# Patient Record
Sex: FEMALE | Race: ASIAN | NOT HISPANIC OR LATINO | Employment: FULL TIME | URBAN - METROPOLITAN AREA
[De-identification: names, ages, dates, MRNs, and addresses within clinical notes are randomized per-mention and may not be internally consistent; named-entity substitution may affect disease eponyms.]

---

## 2017-01-05 ENCOUNTER — OFFICE VISIT - HEALTHEAST (OUTPATIENT)
Dept: FAMILY MEDICINE | Facility: CLINIC | Age: 22
End: 2017-01-05

## 2017-01-05 DIAGNOSIS — B18.1 CHRONIC HEPATITIS B (H): ICD-10-CM

## 2017-01-05 DIAGNOSIS — Z28.39 SUSCEPTIBLE TO VARICELLA (NON-IMMUNE), CURRENTLY PREGNANT: ICD-10-CM

## 2017-01-05 DIAGNOSIS — O09.899 SUSCEPTIBLE TO VARICELLA (NON-IMMUNE), CURRENTLY PREGNANT: ICD-10-CM

## 2017-01-05 DIAGNOSIS — Z32.01 POSITIVE PREGNANCY TEST: ICD-10-CM

## 2017-01-05 DIAGNOSIS — O09.892 SHORT INTERVAL BETWEEN PREGNANCIES AFFECTING PREGNANCY IN SECOND TRIMESTER, ANTEPARTUM: ICD-10-CM

## 2017-01-05 ASSESSMENT — MIFFLIN-ST. JEOR: SCORE: 1195.82

## 2017-06-29 ENCOUNTER — COMMUNICATION - HEALTHEAST (OUTPATIENT)
Dept: FAMILY MEDICINE | Facility: CLINIC | Age: 22
End: 2017-06-29

## 2017-07-21 ENCOUNTER — PRENATAL OFFICE VISIT - HEALTHEAST (OUTPATIENT)
Dept: FAMILY MEDICINE | Facility: CLINIC | Age: 22
End: 2017-07-21

## 2017-07-21 ENCOUNTER — AMBULATORY - HEALTHEAST (OUTPATIENT)
Dept: ADMINISTRATIVE | Facility: CLINIC | Age: 22
End: 2017-07-21

## 2017-07-21 DIAGNOSIS — Z32.01 POSITIVE PREGNANCY TEST: ICD-10-CM

## 2017-07-21 DIAGNOSIS — Z34.90 NORMAL PREGNANCY: ICD-10-CM

## 2017-07-21 DIAGNOSIS — B19.10 HEPATITIS B: ICD-10-CM

## 2017-07-21 DIAGNOSIS — N92.6 ABNORMAL MENSES: ICD-10-CM

## 2017-07-21 LAB
ALT SERPL W P-5'-P-CCNC: 13 U/L (ref 0–45)
HIV 1+2 AB+HIV1 P24 AG SERPL QL IA: NEGATIVE

## 2017-07-22 LAB — HBV SURFACE AG SERPL QL IA: ABNORMAL

## 2017-07-24 LAB
HBV DNA DETECT/QUANT, S: DETECTED IU/ML
SYPHILIS RPR SCREEN - HISTORICAL: NORMAL

## 2017-08-04 ENCOUNTER — RECORDS - HEALTHEAST (OUTPATIENT)
Dept: ADMINISTRATIVE | Facility: OTHER | Age: 22
End: 2017-08-04

## 2017-08-07 ENCOUNTER — AMBULATORY - HEALTHEAST (OUTPATIENT)
Dept: FAMILY MEDICINE | Facility: CLINIC | Age: 22
End: 2017-08-07

## 2017-08-07 ENCOUNTER — COMMUNICATION - HEALTHEAST (OUTPATIENT)
Dept: FAMILY MEDICINE | Facility: CLINIC | Age: 22
End: 2017-08-07

## 2017-08-10 ENCOUNTER — COMMUNICATION - HEALTHEAST (OUTPATIENT)
Dept: FAMILY MEDICINE | Facility: CLINIC | Age: 22
End: 2017-08-10

## 2017-09-01 ENCOUNTER — PRENATAL OFFICE VISIT - HEALTHEAST (OUTPATIENT)
Dept: FAMILY MEDICINE | Facility: CLINIC | Age: 22
End: 2017-09-01

## 2017-09-01 DIAGNOSIS — O09.892 SHORT INTERVAL BETWEEN PREGNANCIES AFFECTING PREGNANCY IN SECOND TRIMESTER, ANTEPARTUM: ICD-10-CM

## 2017-09-01 DIAGNOSIS — Z34.92 NORMAL PREGNANCY, SECOND TRIMESTER: ICD-10-CM

## 2017-09-01 DIAGNOSIS — Z34.82 ENCOUNTER FOR SUPERVISION OF OTHER NORMAL PREGNANCY IN SECOND TRIMESTER: ICD-10-CM

## 2017-09-01 ASSESSMENT — MIFFLIN-ST. JEOR: SCORE: 1254.79

## 2017-09-11 ENCOUNTER — HOSPITAL ENCOUNTER (OUTPATIENT)
Dept: ULTRASOUND IMAGING | Facility: HOSPITAL | Age: 22
Discharge: HOME | End: 2017-09-11
Attending: FAMILY MEDICINE

## 2017-09-11 ENCOUNTER — OFFICE VISIT - HEALTHEAST (OUTPATIENT)
Dept: FAMILY MEDICINE | Facility: CLINIC | Age: 22
End: 2017-09-11

## 2017-09-11 ENCOUNTER — COMMUNICATION - HEALTHEAST (OUTPATIENT)
Dept: SCHEDULING | Facility: CLINIC | Age: 22
End: 2017-09-11

## 2017-09-11 DIAGNOSIS — O09.892 SHORT INTERVAL BETWEEN PREGNANCIES AFFECTING PREGNANCY IN SECOND TRIMESTER, ANTEPARTUM: ICD-10-CM

## 2017-09-11 DIAGNOSIS — O46.92 SECOND TRIMESTER BLEEDING: ICD-10-CM

## 2017-09-11 DIAGNOSIS — Z34.92 NORMAL PREGNANCY, SECOND TRIMESTER: ICD-10-CM

## 2017-09-11 DIAGNOSIS — Z75.8 LANGUAGE BARRIER: ICD-10-CM

## 2017-09-11 DIAGNOSIS — Z60.3 LANGUAGE BARRIER: ICD-10-CM

## 2017-09-11 SDOH — SOCIAL STABILITY - SOCIAL INSECURITY: ACCULTURATION DIFFICULTY: Z60.3

## 2017-09-11 ASSESSMENT — MIFFLIN-ST. JEOR: SCORE: 1254.79

## 2017-09-12 ENCOUNTER — COMMUNICATION - HEALTHEAST (OUTPATIENT)
Dept: FAMILY MEDICINE | Facility: CLINIC | Age: 22
End: 2017-09-12

## 2017-09-18 ENCOUNTER — PRENATAL OFFICE VISIT - HEALTHEAST (OUTPATIENT)
Dept: FAMILY MEDICINE | Facility: CLINIC | Age: 22
End: 2017-09-18

## 2017-09-18 ENCOUNTER — COMMUNICATION - HEALTHEAST (OUTPATIENT)
Dept: FAMILY MEDICINE | Facility: CLINIC | Age: 22
End: 2017-09-18

## 2017-09-18 DIAGNOSIS — Z34.92 NORMAL PREGNANCY, SECOND TRIMESTER: ICD-10-CM

## 2017-09-18 DIAGNOSIS — O46.92 SECOND TRIMESTER BLEEDING: ICD-10-CM

## 2017-09-18 DIAGNOSIS — Z34.90 PREGNANCY: ICD-10-CM

## 2017-10-06 ENCOUNTER — AMBULATORY - HEALTHEAST (OUTPATIENT)
Dept: SCHEDULING | Facility: CLINIC | Age: 22
End: 2017-10-06

## 2017-10-06 ENCOUNTER — PRENATAL OFFICE VISIT - HEALTHEAST (OUTPATIENT)
Dept: FAMILY MEDICINE | Facility: CLINIC | Age: 22
End: 2017-10-06

## 2017-10-06 DIAGNOSIS — Z34.82 ENCOUNTER FOR SUPERVISION OF OTHER NORMAL PREGNANCY IN SECOND TRIMESTER: ICD-10-CM

## 2017-10-06 DIAGNOSIS — O26.849 SIGNIFICANT DISCREPANCY BETWEEN UTERINE SIZE AND CLINICAL DATES, ANTEPARTUM: ICD-10-CM

## 2017-10-13 ENCOUNTER — COMMUNICATION - HEALTHEAST (OUTPATIENT)
Dept: FAMILY MEDICINE | Facility: CLINIC | Age: 22
End: 2017-10-13

## 2017-11-03 ENCOUNTER — PRENATAL OFFICE VISIT - HEALTHEAST (OUTPATIENT)
Dept: FAMILY MEDICINE | Facility: CLINIC | Age: 22
End: 2017-11-03

## 2017-11-03 DIAGNOSIS — Z34.92 NORMAL PREGNANCY IN SECOND TRIMESTER: ICD-10-CM

## 2017-11-03 DIAGNOSIS — Z34.82 ENCOUNTER FOR SUPERVISION OF OTHER NORMAL PREGNANCY IN SECOND TRIMESTER: ICD-10-CM

## 2017-11-03 DIAGNOSIS — O28.3 ECHOGENIC INTRACARDIAC FOCUS OF FETUS ON PRENATAL ULTRASOUND: ICD-10-CM

## 2017-11-13 ENCOUNTER — PRENATAL OFFICE VISIT - HEALTHEAST (OUTPATIENT)
Dept: FAMILY MEDICINE | Facility: CLINIC | Age: 22
End: 2017-11-13

## 2017-11-13 DIAGNOSIS — Z34.92 NORMAL PREGNANCY IN SECOND TRIMESTER: ICD-10-CM

## 2017-11-13 DIAGNOSIS — O47.00 PRETERM UTERINE CONTRACTIONS: ICD-10-CM

## 2017-11-14 ENCOUNTER — COMMUNICATION - HEALTHEAST (OUTPATIENT)
Dept: FAMILY MEDICINE | Facility: CLINIC | Age: 22
End: 2017-11-14

## 2017-11-20 ENCOUNTER — COMMUNICATION - HEALTHEAST (OUTPATIENT)
Dept: FAMILY MEDICINE | Facility: CLINIC | Age: 22
End: 2017-11-20

## 2017-12-29 ENCOUNTER — OFFICE VISIT - HEALTHEAST (OUTPATIENT)
Dept: FAMILY MEDICINE | Facility: CLINIC | Age: 22
End: 2017-12-29

## 2018-03-10 ENCOUNTER — OFFICE VISIT - HEALTHEAST (OUTPATIENT)
Dept: FAMILY MEDICINE | Facility: CLINIC | Age: 23
End: 2018-03-10

## 2018-03-10 DIAGNOSIS — M26.609 TMJ (TEMPOROMANDIBULAR JOINT SYNDROME): ICD-10-CM

## 2018-03-10 DIAGNOSIS — L03.811 CELLULITIS OF SCALP: ICD-10-CM

## 2018-08-14 ENCOUNTER — COMMUNICATION - HEALTHEAST (OUTPATIENT)
Dept: FAMILY MEDICINE | Facility: CLINIC | Age: 23
End: 2018-08-14

## 2018-08-22 ENCOUNTER — PRENATAL OFFICE VISIT - HEALTHEAST (OUTPATIENT)
Dept: FAMILY MEDICINE | Facility: CLINIC | Age: 23
End: 2018-08-22

## 2018-08-22 ENCOUNTER — HOSPITAL ENCOUNTER (OUTPATIENT)
Dept: ULTRASOUND IMAGING | Facility: HOSPITAL | Age: 23
Discharge: HOME | End: 2018-08-22
Attending: PHYSICIAN ASSISTANT

## 2018-08-22 DIAGNOSIS — Z34.90 NORMAL PREGNANCY: ICD-10-CM

## 2018-08-22 DIAGNOSIS — N92.6 ABNORMAL MENSES: ICD-10-CM

## 2018-08-22 DIAGNOSIS — Z32.01 PREGNANCY TEST POSITIVE: ICD-10-CM

## 2018-08-22 DIAGNOSIS — B18.1 CHRONIC HEPATITIS B (H): ICD-10-CM

## 2018-08-22 DIAGNOSIS — O09.899 HISTORY OF PRETERM DELIVERY, CURRENTLY PREGNANT: ICD-10-CM

## 2018-08-22 LAB
ALBUMIN UR-MCNC: NEGATIVE MG/DL
AMPHETAMINES UR QL SCN: NORMAL
BARBITURATES UR QL: NORMAL
BASOPHILS # BLD AUTO: 0 THOU/UL (ref 0–0.2)
BASOPHILS NFR BLD AUTO: 0 % (ref 0–2)
BENZODIAZ UR QL: NORMAL
CANNABINOIDS UR QL SCN: NORMAL
COCAINE UR QL: NORMAL
CREAT UR-MCNC: 157.5 MG/DL
EOSINOPHIL # BLD AUTO: 0.2 THOU/UL (ref 0–0.4)
EOSINOPHIL NFR BLD AUTO: 3 % (ref 0–6)
ERYTHROCYTE [DISTWIDTH] IN BLOOD BY AUTOMATED COUNT: 13.9 % (ref 11–14.5)
GLUCOSE UR STRIP-MCNC: NEGATIVE MG/DL
HCG UR QL: POSITIVE
HCT VFR BLD AUTO: 36.2 % (ref 35–47)
HGB BLD-MCNC: 12.3 G/DL (ref 12–16)
HIV 1+2 AB+HIV1 P24 AG SERPL QL IA: NEGATIVE
KETONES UR STRIP-MCNC: NEGATIVE MG/DL
LYMPHOCYTES # BLD AUTO: 2 THOU/UL (ref 0.8–4.4)
LYMPHOCYTES NFR BLD AUTO: 25 % (ref 20–40)
MCH RBC QN AUTO: 27.8 PG (ref 27–34)
MCHC RBC AUTO-ENTMCNC: 34 G/DL (ref 32–36)
MCV RBC AUTO: 82 FL (ref 80–100)
MONOCYTES # BLD AUTO: 0.7 THOU/UL (ref 0–0.9)
MONOCYTES NFR BLD AUTO: 8 % (ref 2–10)
NEUTROPHILS # BLD AUTO: 5.1 THOU/UL (ref 2–7.7)
NEUTROPHILS NFR BLD AUTO: 64 % (ref 50–70)
OPIATES UR QL SCN: NORMAL
OXYCODONE UR QL: NORMAL
PCP UR QL SCN: NORMAL
PLATELET # BLD AUTO: 238 THOU/UL (ref 140–440)
PMV BLD AUTO: 9.1 FL (ref 8.5–12.5)
RBC # BLD AUTO: 4.42 MILL/UL (ref 3.8–5.4)
T PALLIDUM AB SER QL: NEGATIVE
WBC: 7.9 THOU/UL (ref 4–11)

## 2018-08-23 LAB
ABO/RH(D): NORMAL
ABORH REPEAT: NORMAL
ANTIBODY SCREEN: NEGATIVE
BACTERIA SPEC CULT: NO GROWTH
COLLECTION METHOD: NORMAL
LEAD BLD-MCNC: NORMAL UG/DL
LEAD RETEST: NO
RUBV IGG SERPL QL IA: POSITIVE

## 2018-08-24 LAB
ALT SERPL W P-5'-P-CCNC: 11 U/L (ref 0–45)
GUARDIAN FIRST NAME: NORMAL
GUARDIAN LAST NAME: NORMAL
HBV SURFACE AG SERPL QL IA: ABNORMAL
HEALTH CARE PROVIDER CITY: NORMAL
HEALTH CARE PROVIDER NAME: NORMAL
HEALTH CARE PROVIDER PHONE: NORMAL
HEALTH CARE PROVIDER STATE: NORMAL
HEALTH CARE PROVIDER STREET ADDRESS: NORMAL
HEALTH CARE PROVIDER ZIP CODE: NORMAL
LEAD, B: <1 MCG/DL (ref 0–4.9)
PATIENT CITY: NORMAL
PATIENT COUNTY: NORMAL
PATIENT EMPLOYER: NORMAL
PATIENT ETHNICITY: NORMAL
PATIENT HOME PHONE: NORMAL
PATIENT OCCUPATION: NORMAL
PATIENT RACE: NORMAL
PATIENT STATE: NORMAL
PATIENT STREET ADDRESS: NORMAL
PATIENT ZIP CODE: NORMAL
SUBMITTING LABORATORY PHONE: NORMAL
VENOUS/CAPILLARY: NORMAL

## 2018-08-28 LAB
HBV DNA SERPL NAA+PROBE-ACNC: ABNORMAL [IU]/ML
HBV DNA SERPL NAA+PROBE-LOG IU: >8.2 {LOG_IU}/ML

## 2018-09-07 ENCOUNTER — PRENATAL OFFICE VISIT - HEALTHEAST (OUTPATIENT)
Dept: FAMILY MEDICINE | Facility: CLINIC | Age: 23
End: 2018-09-07

## 2018-09-07 DIAGNOSIS — B18.1 CHRONIC HEPATITIS B (H): ICD-10-CM

## 2018-09-07 DIAGNOSIS — O99.210 OBESITY AFFECTING PREGNANCY: ICD-10-CM

## 2018-09-07 DIAGNOSIS — O09.899 HISTORY OF PRETERM DELIVERY, CURRENTLY PREGNANT: ICD-10-CM

## 2018-09-07 DIAGNOSIS — Z34.81 ENCOUNTER FOR SUPERVISION OF OTHER NORMAL PREGNANCY IN FIRST TRIMESTER: ICD-10-CM

## 2018-09-07 DIAGNOSIS — O09.899 SHORT INTERVAL BETWEEN PREGNANCIES AFFECTING PREGNANCY, ANTEPARTUM: ICD-10-CM

## 2018-09-07 LAB
CLUE CELLS: NORMAL
TRICHOMONAS, WET PREP: NORMAL
YEAST, WET PREP: NORMAL

## 2018-09-10 LAB
C TRACH DNA SPEC QL PROBE+SIG AMP: NEGATIVE
N GONORRHOEA DNA SPEC QL NAA+PROBE: NEGATIVE

## 2018-09-11 ENCOUNTER — COMMUNICATION - HEALTHEAST (OUTPATIENT)
Dept: FAMILY MEDICINE | Facility: CLINIC | Age: 23
End: 2018-09-11

## 2018-09-21 ENCOUNTER — COMMUNICATION - HEALTHEAST (OUTPATIENT)
Dept: PHARMACY | Facility: CLINIC | Age: 23
End: 2018-09-21

## 2018-09-21 ENCOUNTER — OFFICE VISIT - HEALTHEAST (OUTPATIENT)
Dept: PHARMACY | Facility: CLINIC | Age: 23
End: 2018-09-21

## 2018-09-21 DIAGNOSIS — O09.899 HISTORY OF PRETERM DELIVERY, CURRENTLY PREGNANT: ICD-10-CM

## 2018-09-21 DIAGNOSIS — B18.1 CHRONIC HEPATITIS B (H): ICD-10-CM

## 2018-10-05 ENCOUNTER — PRENATAL OFFICE VISIT - HEALTHEAST (OUTPATIENT)
Dept: FAMILY MEDICINE | Facility: CLINIC | Age: 23
End: 2018-10-05

## 2018-10-05 ENCOUNTER — OFFICE VISIT - HEALTHEAST (OUTPATIENT)
Dept: PHARMACY | Facility: CLINIC | Age: 23
End: 2018-10-05

## 2018-10-05 DIAGNOSIS — O09.899 HISTORY OF PRETERM DELIVERY, CURRENTLY PREGNANT: ICD-10-CM

## 2018-10-05 DIAGNOSIS — O09.892 SUPERVISION OF OTHER HIGH RISK PREGNANCIES, SECOND TRIMESTER: ICD-10-CM

## 2018-10-05 LAB
ALBUMIN UR-MCNC: NEGATIVE MG/DL
GLUCOSE UR STRIP-MCNC: NEGATIVE MG/DL
KETONES UR STRIP-MCNC: NEGATIVE MG/DL

## 2018-10-09 ENCOUNTER — AMBULATORY - HEALTHEAST (OUTPATIENT)
Dept: PHARMACY | Facility: CLINIC | Age: 23
End: 2018-10-09

## 2018-10-09 DIAGNOSIS — O09.899 HISTORY OF PRETERM DELIVERY, CURRENTLY PREGNANT: ICD-10-CM

## 2018-10-18 ENCOUNTER — AMBULATORY - HEALTHEAST (OUTPATIENT)
Dept: PHARMACY | Facility: CLINIC | Age: 23
End: 2018-10-18

## 2018-10-23 ENCOUNTER — AMBULATORY - HEALTHEAST (OUTPATIENT)
Dept: FAMILY MEDICINE | Facility: CLINIC | Age: 23
End: 2018-10-23

## 2018-10-23 DIAGNOSIS — Z59.71 INSURANCE COVERAGE PROBLEMS: ICD-10-CM

## 2018-10-23 DIAGNOSIS — O09.899 HISTORY OF PRETERM DELIVERY, CURRENTLY PREGNANT: ICD-10-CM

## 2018-10-26 ENCOUNTER — AMBULATORY - HEALTHEAST (OUTPATIENT)
Dept: NURSING | Facility: CLINIC | Age: 23
End: 2018-10-26

## 2018-10-26 ENCOUNTER — AMBULATORY - HEALTHEAST (OUTPATIENT)
Dept: FAMILY MEDICINE | Facility: CLINIC | Age: 23
End: 2018-10-26

## 2018-10-26 DIAGNOSIS — O09.899 HISTORY OF PRETERM DELIVERY, CURRENTLY PREGNANT: ICD-10-CM

## 2018-10-31 ENCOUNTER — COMMUNICATION - HEALTHEAST (OUTPATIENT)
Dept: NURSING | Facility: CLINIC | Age: 23
End: 2018-10-31

## 2018-11-02 ENCOUNTER — PRENATAL OFFICE VISIT - HEALTHEAST (OUTPATIENT)
Dept: FAMILY MEDICINE | Facility: CLINIC | Age: 23
End: 2018-11-02

## 2018-11-02 DIAGNOSIS — O09.212 HIGH RISK PREGNANCY DUE TO HISTORY OF PRETERM LABOR IN SECOND TRIMESTER: ICD-10-CM

## 2018-11-02 DIAGNOSIS — O09.899 HISTORY OF PRETERM DELIVERY, CURRENTLY PREGNANT: ICD-10-CM

## 2018-11-02 DIAGNOSIS — O09.92 HIGH-RISK PREGNANCY SUPERVISION, SECOND TRIMESTER: ICD-10-CM

## 2018-11-08 ENCOUNTER — COMMUNICATION - HEALTHEAST (OUTPATIENT)
Dept: FAMILY MEDICINE | Facility: CLINIC | Age: 23
End: 2018-11-08

## 2018-11-09 ENCOUNTER — AMBULATORY - HEALTHEAST (OUTPATIENT)
Dept: NURSING | Facility: CLINIC | Age: 23
End: 2018-11-09

## 2018-11-16 ENCOUNTER — AMBULATORY - HEALTHEAST (OUTPATIENT)
Dept: NURSING | Facility: CLINIC | Age: 23
End: 2018-11-16

## 2018-11-23 ENCOUNTER — AMBULATORY - HEALTHEAST (OUTPATIENT)
Dept: NURSING | Facility: CLINIC | Age: 23
End: 2018-11-23

## 2018-11-26 ENCOUNTER — RECORDS - HEALTHEAST (OUTPATIENT)
Dept: ADMINISTRATIVE | Facility: OTHER | Age: 23
End: 2018-11-26

## 2018-11-30 ENCOUNTER — PRENATAL OFFICE VISIT - HEALTHEAST (OUTPATIENT)
Dept: FAMILY MEDICINE | Facility: CLINIC | Age: 23
End: 2018-11-30

## 2018-11-30 DIAGNOSIS — O09.212 HIGH RISK PREGNANCY DUE TO HISTORY OF PRETERM LABOR IN SECOND TRIMESTER: ICD-10-CM

## 2018-11-30 DIAGNOSIS — Z34.82 ENCOUNTER FOR SUPERVISION OF OTHER NORMAL PREGNANCY IN SECOND TRIMESTER: ICD-10-CM

## 2018-12-07 ENCOUNTER — AMBULATORY - HEALTHEAST (OUTPATIENT)
Dept: NURSING | Facility: CLINIC | Age: 23
End: 2018-12-07

## 2018-12-14 ENCOUNTER — AMBULATORY - HEALTHEAST (OUTPATIENT)
Dept: NURSING | Facility: CLINIC | Age: 23
End: 2018-12-14

## 2018-12-21 ENCOUNTER — AMBULATORY - HEALTHEAST (OUTPATIENT)
Dept: NURSING | Facility: CLINIC | Age: 23
End: 2018-12-21

## 2018-12-28 ENCOUNTER — PRENATAL OFFICE VISIT - HEALTHEAST (OUTPATIENT)
Dept: FAMILY MEDICINE | Facility: CLINIC | Age: 23
End: 2018-12-28

## 2018-12-28 ENCOUNTER — COMMUNICATION - HEALTHEAST (OUTPATIENT)
Dept: FAMILY MEDICINE | Facility: CLINIC | Age: 23
End: 2018-12-28

## 2018-12-28 DIAGNOSIS — O09.212 HIGH RISK PREGNANCY DUE TO HISTORY OF PRETERM LABOR IN SECOND TRIMESTER: ICD-10-CM

## 2018-12-28 DIAGNOSIS — O09.899 HISTORY OF PRETERM DELIVERY, CURRENTLY PREGNANT: ICD-10-CM

## 2018-12-28 DIAGNOSIS — Z60.3 LANGUAGE BARRIER: ICD-10-CM

## 2018-12-28 DIAGNOSIS — O09.892 SUPERVISION OF OTHER HIGH RISK PREGNANCIES, SECOND TRIMESTER: ICD-10-CM

## 2018-12-28 DIAGNOSIS — Z75.8 LANGUAGE BARRIER: ICD-10-CM

## 2018-12-28 DIAGNOSIS — B18.1 CHRONIC HEPATITIS B (H): ICD-10-CM

## 2018-12-28 LAB
ALBUMIN UR-MCNC: ABNORMAL MG/DL
FASTING STATUS PATIENT QL REPORTED: NO
GLUCOSE 1H P 50 G GLC PO SERPL-MCNC: 97 MG/DL (ref 70–139)
GLUCOSE UR STRIP-MCNC: NEGATIVE MG/DL
HGB BLD-MCNC: 11.9 G/DL (ref 12–16)
KETONES UR STRIP-MCNC: NEGATIVE MG/DL
T PALLIDUM AB SER QL: NEGATIVE

## 2018-12-28 SDOH — SOCIAL STABILITY - SOCIAL INSECURITY: ACCULTURATION DIFFICULTY: Z60.3

## 2019-01-01 LAB
HBV DNA SERPL NAA+PROBE-ACNC: ABNORMAL [IU]/ML
HBV DNA SERPL NAA+PROBE-LOG IU: 7.8 {LOG_IU}/ML

## 2019-01-04 ENCOUNTER — AMBULATORY - HEALTHEAST (OUTPATIENT)
Dept: NURSING | Facility: CLINIC | Age: 24
End: 2019-01-04

## 2019-01-11 ENCOUNTER — AMBULATORY - HEALTHEAST (OUTPATIENT)
Dept: NURSING | Facility: CLINIC | Age: 24
End: 2019-01-11

## 2019-01-18 ENCOUNTER — COMMUNICATION - HEALTHEAST (OUTPATIENT)
Dept: FAMILY MEDICINE | Facility: CLINIC | Age: 24
End: 2019-01-18

## 2019-01-18 ENCOUNTER — AMBULATORY - HEALTHEAST (OUTPATIENT)
Dept: NURSING | Facility: CLINIC | Age: 24
End: 2019-01-18

## 2019-01-25 ENCOUNTER — PRENATAL OFFICE VISIT - HEALTHEAST (OUTPATIENT)
Dept: FAMILY MEDICINE | Facility: CLINIC | Age: 24
End: 2019-01-25

## 2019-01-25 DIAGNOSIS — B18.1 CHRONIC HEPATITIS B (H): ICD-10-CM

## 2019-01-25 DIAGNOSIS — O09.899 HISTORY OF PRETERM DELIVERY, CURRENTLY PREGNANT: ICD-10-CM

## 2019-01-25 DIAGNOSIS — O99.213 OBESITY AFFECTING PREGNANCY IN THIRD TRIMESTER: ICD-10-CM

## 2019-01-25 DIAGNOSIS — O09.899 SHORT INTERVAL BETWEEN PREGNANCIES AFFECTING PREGNANCY, ANTEPARTUM: ICD-10-CM

## 2019-01-25 DIAGNOSIS — Z75.8 LANGUAGE BARRIER: ICD-10-CM

## 2019-01-25 DIAGNOSIS — O09.893 SUPERVISION OF OTHER HIGH RISK PREGNANCIES, THIRD TRIMESTER: ICD-10-CM

## 2019-01-25 DIAGNOSIS — Z60.3 LANGUAGE BARRIER: ICD-10-CM

## 2019-01-25 SDOH — SOCIAL STABILITY - SOCIAL INSECURITY: ACCULTURATION DIFFICULTY: Z60.3

## 2019-02-01 ENCOUNTER — AMBULATORY - HEALTHEAST (OUTPATIENT)
Dept: NURSING | Facility: CLINIC | Age: 24
End: 2019-02-01

## 2019-02-05 ENCOUNTER — PRENATAL OFFICE VISIT - HEALTHEAST (OUTPATIENT)
Dept: FAMILY MEDICINE | Facility: CLINIC | Age: 24
End: 2019-02-05

## 2019-02-05 DIAGNOSIS — O09.893 SUPERVISION OF OTHER HIGH RISK PREGNANCIES, THIRD TRIMESTER: ICD-10-CM

## 2019-02-07 ENCOUNTER — COMMUNICATION - HEALTHEAST (OUTPATIENT)
Dept: FAMILY MEDICINE | Facility: CLINIC | Age: 24
End: 2019-02-07

## 2019-02-07 LAB
ALLERGIC TO PENICILLIN: NO
GP B STREP DNA SPEC QL NAA+PROBE: POSITIVE

## 2019-02-08 ENCOUNTER — AMBULATORY - HEALTHEAST (OUTPATIENT)
Dept: NURSING | Facility: CLINIC | Age: 24
End: 2019-02-08

## 2019-02-12 ENCOUNTER — PRENATAL OFFICE VISIT - HEALTHEAST (OUTPATIENT)
Dept: FAMILY MEDICINE | Facility: CLINIC | Age: 24
End: 2019-02-12

## 2019-02-12 DIAGNOSIS — O09.893 SUPERVISION OF OTHER HIGH RISK PREGNANCIES, THIRD TRIMESTER: ICD-10-CM

## 2019-02-13 ENCOUNTER — COMMUNICATION - HEALTHEAST (OUTPATIENT)
Dept: FAMILY MEDICINE | Facility: CLINIC | Age: 24
End: 2019-02-13

## 2019-02-14 ENCOUNTER — COMMUNICATION - HEALTHEAST (OUTPATIENT)
Dept: FAMILY MEDICINE | Facility: CLINIC | Age: 24
End: 2019-02-14

## 2019-02-15 ENCOUNTER — AMBULATORY - HEALTHEAST (OUTPATIENT)
Dept: NURSING | Facility: CLINIC | Age: 24
End: 2019-02-15

## 2019-02-18 ENCOUNTER — COMMUNICATION - HEALTHEAST (OUTPATIENT)
Dept: FAMILY MEDICINE | Facility: CLINIC | Age: 24
End: 2019-02-18

## 2019-02-22 ENCOUNTER — COMMUNICATION - HEALTHEAST (OUTPATIENT)
Dept: TELEHEALTH | Facility: CLINIC | Age: 24
End: 2019-02-22

## 2019-02-22 ENCOUNTER — PRENATAL OFFICE VISIT - HEALTHEAST (OUTPATIENT)
Dept: FAMILY MEDICINE | Facility: CLINIC | Age: 24
End: 2019-02-22

## 2019-02-22 DIAGNOSIS — Z60.3 LANGUAGE BARRIER: ICD-10-CM

## 2019-02-22 DIAGNOSIS — O99.820 GROUP B STREPTOCOCCUS CARRIER, +RV CULTURE, CURRENTLY PREGNANT: ICD-10-CM

## 2019-02-22 DIAGNOSIS — Z75.8 LANGUAGE BARRIER: ICD-10-CM

## 2019-02-22 DIAGNOSIS — O09.893 SUPERVISION OF OTHER HIGH RISK PREGNANCIES, THIRD TRIMESTER: ICD-10-CM

## 2019-02-22 DIAGNOSIS — O09.899 HISTORY OF PRETERM DELIVERY, CURRENTLY PREGNANT: ICD-10-CM

## 2019-02-22 DIAGNOSIS — B18.1 CHRONIC HEPATITIS B (H): ICD-10-CM

## 2019-02-22 DIAGNOSIS — O99.213 OBESITY AFFECTING PREGNANCY IN THIRD TRIMESTER: ICD-10-CM

## 2019-02-22 LAB
ALBUMIN UR-MCNC: ABNORMAL MG/DL
GLUCOSE UR STRIP-MCNC: NEGATIVE MG/DL
KETONES UR STRIP-MCNC: NEGATIVE MG/DL

## 2019-02-22 SDOH — SOCIAL STABILITY - SOCIAL INSECURITY: ACCULTURATION DIFFICULTY: Z60.3

## 2019-03-01 ENCOUNTER — AMBULATORY - HEALTHEAST (OUTPATIENT)
Dept: NURSING | Facility: CLINIC | Age: 24
End: 2019-03-01

## 2019-03-08 ENCOUNTER — PRENATAL OFFICE VISIT - HEALTHEAST (OUTPATIENT)
Dept: FAMILY MEDICINE | Facility: CLINIC | Age: 24
End: 2019-03-08

## 2019-03-08 ENCOUNTER — COMMUNICATION - HEALTHEAST (OUTPATIENT)
Dept: FAMILY MEDICINE | Facility: CLINIC | Age: 24
End: 2019-03-08

## 2019-03-08 DIAGNOSIS — O99.213 OBESITY AFFECTING PREGNANCY IN THIRD TRIMESTER: ICD-10-CM

## 2019-03-08 DIAGNOSIS — O99.820 GROUP B STREPTOCOCCUS CARRIER, +RV CULTURE, CURRENTLY PREGNANT: ICD-10-CM

## 2019-03-08 DIAGNOSIS — O09.899 HISTORY OF PRETERM DELIVERY, CURRENTLY PREGNANT: ICD-10-CM

## 2019-03-08 DIAGNOSIS — B18.1 CHRONIC HEPATITIS B (H): ICD-10-CM

## 2019-03-08 DIAGNOSIS — Z60.3 LANGUAGE BARRIER: ICD-10-CM

## 2019-03-08 DIAGNOSIS — Z75.8 LANGUAGE BARRIER: ICD-10-CM

## 2019-03-08 DIAGNOSIS — O09.899 SHORT INTERVAL BETWEEN PREGNANCIES AFFECTING PREGNANCY, ANTEPARTUM: ICD-10-CM

## 2019-03-08 DIAGNOSIS — O09.893 SUPERVISION OF OTHER HIGH RISK PREGNANCIES, THIRD TRIMESTER: ICD-10-CM

## 2019-03-08 SDOH — SOCIAL STABILITY - SOCIAL INSECURITY: ACCULTURATION DIFFICULTY: Z60.3

## 2019-03-08 ASSESSMENT — MIFFLIN-ST. JEOR: SCORE: 1373.86

## 2019-03-15 ENCOUNTER — PRENATAL OFFICE VISIT - HEALTHEAST (OUTPATIENT)
Dept: FAMILY MEDICINE | Facility: CLINIC | Age: 24
End: 2019-03-15

## 2019-03-15 DIAGNOSIS — O09.893 SUPERVISION OF OTHER HIGH RISK PREGNANCIES, THIRD TRIMESTER: ICD-10-CM

## 2019-05-10 ENCOUNTER — OFFICE VISIT - HEALTHEAST (OUTPATIENT)
Dept: FAMILY MEDICINE | Facility: CLINIC | Age: 24
End: 2019-05-10

## 2019-05-10 ENCOUNTER — RECORDS - HEALTHEAST (OUTPATIENT)
Dept: ADMINISTRATIVE | Facility: OTHER | Age: 24
End: 2019-05-10

## 2019-05-10 ENCOUNTER — OFFICE VISIT - HEALTHEAST (OUTPATIENT)
Dept: INTERPRETER SERVICES | Facility: CLINIC | Age: 24
End: 2019-05-10

## 2019-05-10 DIAGNOSIS — E66.3 OVERWEIGHT (BMI 25.0-29.9): ICD-10-CM

## 2019-05-10 DIAGNOSIS — Z30.2 ENCOUNTER FOR STERILIZATION: ICD-10-CM

## 2019-05-21 ENCOUNTER — COMMUNICATION - HEALTHEAST (OUTPATIENT)
Dept: FAMILY MEDICINE | Facility: CLINIC | Age: 24
End: 2019-05-21

## 2020-06-25 ENCOUNTER — COMMUNICATION - HEALTHEAST (OUTPATIENT)
Dept: FAMILY MEDICINE | Facility: CLINIC | Age: 25
End: 2020-06-25

## 2020-06-29 ENCOUNTER — OFFICE VISIT - HEALTHEAST (OUTPATIENT)
Dept: FAMILY MEDICINE | Facility: CLINIC | Age: 25
End: 2020-06-29

## 2020-06-29 ENCOUNTER — COMMUNICATION - HEALTHEAST (OUTPATIENT)
Dept: FAMILY MEDICINE | Facility: CLINIC | Age: 25
End: 2020-06-29

## 2020-06-29 ENCOUNTER — HOSPITAL ENCOUNTER (OUTPATIENT)
Dept: ULTRASOUND IMAGING | Facility: HOSPITAL | Age: 25
Discharge: HOME | End: 2020-06-29
Attending: FAMILY MEDICINE

## 2020-06-29 DIAGNOSIS — E66.3 OVERWEIGHT: ICD-10-CM

## 2020-06-29 DIAGNOSIS — R10.2 PELVIC PAIN AFFECTING PREGNANCY IN FIRST TRIMESTER, ANTEPARTUM: ICD-10-CM

## 2020-06-29 DIAGNOSIS — O20.9 FIRST TRIMESTER BLEEDING: ICD-10-CM

## 2020-06-29 DIAGNOSIS — Z34.81 ENCOUNTER FOR SUPERVISION OF OTHER NORMAL PREGNANCY IN FIRST TRIMESTER: ICD-10-CM

## 2020-06-29 DIAGNOSIS — O26.891 PELVIC PAIN AFFECTING PREGNANCY IN FIRST TRIMESTER, ANTEPARTUM: ICD-10-CM

## 2020-06-29 LAB
AMPHETAMINES UR QL SCN: NORMAL
BARBITURATES UR QL: NORMAL
BASOPHILS # BLD AUTO: 0 THOU/UL (ref 0–0.2)
BASOPHILS NFR BLD AUTO: 0 % (ref 0–2)
BENZODIAZ UR QL: NORMAL
CANNABINOIDS UR QL SCN: NORMAL
COCAINE UR QL: NORMAL
CREAT UR-MCNC: 35.9 MG/DL
EOSINOPHIL # BLD AUTO: 0.1 THOU/UL (ref 0–0.4)
EOSINOPHIL NFR BLD AUTO: 2 % (ref 0–6)
ERYTHROCYTE [DISTWIDTH] IN BLOOD BY AUTOMATED COUNT: 13.2 % (ref 11–14.5)
HCG SERPL-ACNC: ABNORMAL MLU/ML (ref 0–4)
HCG UR QL: POSITIVE
HCT VFR BLD AUTO: 40.5 % (ref 35–47)
HGB BLD-MCNC: 12.8 G/DL (ref 12–16)
HIV 1+2 AB+HIV1 P24 AG SERPL QL IA: NEGATIVE
LYMPHOCYTES # BLD AUTO: 1.9 THOU/UL (ref 0.8–4.4)
LYMPHOCYTES NFR BLD AUTO: 24 % (ref 20–40)
MCH RBC QN AUTO: 27 PG (ref 27–34)
MCHC RBC AUTO-ENTMCNC: 31.6 G/DL (ref 32–36)
MCV RBC AUTO: 85 FL (ref 80–100)
MONOCYTES # BLD AUTO: 0.5 THOU/UL (ref 0–0.9)
MONOCYTES NFR BLD AUTO: 6 % (ref 2–10)
NEUTROPHILS # BLD AUTO: 5.1 THOU/UL (ref 2–7.7)
NEUTROPHILS NFR BLD AUTO: 67 % (ref 50–70)
OPIATES UR QL SCN: NORMAL
OXYCODONE UR QL: NORMAL
PCP UR QL SCN: NORMAL
PLATELET # BLD AUTO: 274 THOU/UL (ref 140–440)
PMV BLD AUTO: 9.6 FL (ref 8.5–12.5)
RBC # BLD AUTO: 4.74 MILL/UL (ref 3.8–5.4)
WBC: 7.6 THOU/UL (ref 4–11)

## 2020-06-30 ENCOUNTER — COMMUNICATION - HEALTHEAST (OUTPATIENT)
Dept: FAMILY MEDICINE | Facility: CLINIC | Age: 25
End: 2020-06-30

## 2020-06-30 LAB
ABO/RH(D): NORMAL
ABORH REPEAT: NORMAL
ANTIBODY SCREEN: NEGATIVE
BACTERIA SPEC CULT: NO GROWTH
C TRACH DNA SPEC QL PROBE+SIG AMP: NEGATIVE
HBV SURFACE AG SERPL QL IA: ABNORMAL
N GONORRHOEA DNA SPEC QL NAA+PROBE: NEGATIVE
RUBV IGG SERPL QL IA: POSITIVE
T PALLIDUM AB SER QL: NEGATIVE

## 2020-07-01 ENCOUNTER — AMBULATORY - HEALTHEAST (OUTPATIENT)
Dept: LAB | Facility: CLINIC | Age: 25
End: 2020-07-01

## 2020-07-01 DIAGNOSIS — O20.9 FIRST TRIMESTER BLEEDING: ICD-10-CM

## 2020-07-01 DIAGNOSIS — R10.2 PELVIC PAIN AFFECTING PREGNANCY IN FIRST TRIMESTER, ANTEPARTUM: ICD-10-CM

## 2020-07-01 DIAGNOSIS — O26.891 PELVIC PAIN AFFECTING PREGNANCY IN FIRST TRIMESTER, ANTEPARTUM: ICD-10-CM

## 2020-07-01 LAB
GAMMA INTERFERON BACKGROUND BLD IA-ACNC: 0.03 IU/ML
HCG SERPL-ACNC: ABNORMAL MLU/ML (ref 0–4)
M TB IFN-G BLD-IMP: NEGATIVE
MITOGEN IGNF BCKGRD COR BLD-ACNC: 0 IU/ML
MITOGEN IGNF BCKGRD COR BLD-ACNC: 0.01 IU/ML
QTF INTERPRETATION: NORMAL
QTF MITOGEN - NIL: 7.19 IU/ML

## 2020-07-02 LAB
COLLECTION METHOD: NORMAL
LEAD BLD-MCNC: NORMAL UG/DL
LEAD BLDV-MCNC: <2 UG/DL

## 2020-07-06 ENCOUNTER — HOSPITAL ENCOUNTER (OUTPATIENT)
Dept: ULTRASOUND IMAGING | Facility: HOSPITAL | Age: 25
Discharge: HOME | End: 2020-07-06
Attending: FAMILY MEDICINE

## 2020-07-06 DIAGNOSIS — R10.2 PELVIC PAIN AFFECTING PREGNANCY IN FIRST TRIMESTER, ANTEPARTUM: ICD-10-CM

## 2020-07-06 DIAGNOSIS — O26.891 PELVIC PAIN AFFECTING PREGNANCY IN FIRST TRIMESTER, ANTEPARTUM: ICD-10-CM

## 2020-07-06 DIAGNOSIS — O20.9 FIRST TRIMESTER BLEEDING: ICD-10-CM

## 2020-07-07 ENCOUNTER — COMMUNICATION - HEALTHEAST (OUTPATIENT)
Dept: FAMILY MEDICINE | Facility: CLINIC | Age: 25
End: 2020-07-07

## 2020-07-07 ENCOUNTER — OFFICE VISIT - HEALTHEAST (OUTPATIENT)
Dept: FAMILY MEDICINE | Facility: CLINIC | Age: 25
End: 2020-07-07

## 2020-07-07 DIAGNOSIS — O03.9 MISCARRIAGE: ICD-10-CM

## 2020-07-07 LAB
HEMOGLOBIN A2 QUANTITATION: 2.7 % (ref 2.2–3.5)
HEMOGLOBIN ELECTROPHRESIS: NORMAL
HEMOGLOBIN F QUANTITATION: <0.8 % (ref 0–2)
PATH ICD:: NORMAL
REVIEWING PATHOLOGIST: NORMAL

## 2020-07-15 ENCOUNTER — AMBULATORY - HEALTHEAST (OUTPATIENT)
Dept: SURGERY | Facility: AMBULATORY SURGERY CENTER | Age: 25
End: 2020-07-15

## 2020-07-15 DIAGNOSIS — Z11.59 ENCOUNTER FOR SCREENING FOR OTHER VIRAL DISEASES: ICD-10-CM

## 2020-07-15 ASSESSMENT — MIFFLIN-ST. JEOR: SCORE: 1309.22

## 2020-07-16 ENCOUNTER — ANESTHESIA - HEALTHEAST (OUTPATIENT)
Dept: SURGERY | Facility: AMBULATORY SURGERY CENTER | Age: 25
End: 2020-07-16

## 2020-07-16 DIAGNOSIS — Z11.59 SPECIAL SCREENING EXAMINATION FOR VIRAL DISEASE: Primary | ICD-10-CM

## 2020-07-16 LAB
LABORATORY COMMENT REPORT: NORMAL
SARS-COV-2 RNA SPEC QL NAA+PROBE: NEGATIVE
SARS-COV-2 RNA SPEC QL NAA+PROBE: NORMAL
SPECIMEN SOURCE: NORMAL
SPECIMEN SOURCE: NORMAL

## 2020-07-16 PROCEDURE — U0003 INFECTIOUS AGENT DETECTION BY NUCLEIC ACID (DNA OR RNA); SEVERE ACUTE RESPIRATORY SYNDROME CORONAVIRUS 2 (SARS-COV-2) (CORONAVIRUS DISEASE [COVID-19]), AMPLIFIED PROBE TECHNIQUE, MAKING USE OF HIGH THROUGHPUT TECHNOLOGIES AS DESCRIBED BY CMS-2020-01-R: HCPCS | Performed by: FAMILY MEDICINE

## 2020-07-17 ENCOUNTER — SURGERY - HEALTHEAST (OUTPATIENT)
Dept: SURGERY | Facility: AMBULATORY SURGERY CENTER | Age: 25
End: 2020-07-17

## 2020-07-17 ASSESSMENT — MIFFLIN-ST. JEOR: SCORE: 1309.22

## 2021-05-28 NOTE — PROGRESS NOTES
Postpartum Visit  [facility]  Date of Service: 05/10/2019    Hemant Nguyen is a 24 y.o. yo  who presents for a postpartum check.    Current Concerns  None    Contraception Plans  undecided. Leaning toward tubal ligation.    Delivery  The patient delivered at [facility] on 3/19/19 via .   Labor was spontaneous and complicated by transverse fetal lie, requiring ECV in labor.     The baby had the following  complications:  none.   Her daughter, Janina, is currently exclusively formula feeding.     Pregnancy:  Patient Active Problem List    Diagnosis Date Noted     High risk pregnancy 2018     Priority: High     Overview Note:     23 y.o.  Baby's Gender: GIRL  : Berenice Ramirez, Employment: assembly/packaging  Genetic screening: declined all screening.  Shots: Flu shot: 18, Tdap: 19, Rubella immune, Varicella previously immunized, Hepatitis B sAg positive (chronic hepatitis B).         Group B Streptococcus carrier, +RV culture, currently pregnant 2019     Priority: Medium     Overview Note:     19 @ 32 weeks.       History of  delivery at 25 weeks 2018     Priority: Medium     Overview Note:     Spontaneous  at 25w2d . Plan: IM progesterone ([place]) 16-36 weeks, consult OB/gyn - Dr. Dasilva, cervical length US at 16 weeks.       Chronic hepatitis B (H) 2015     Priority: Medium     Overview Note:     Very high viral load, HBeAg+. Patient declines GI referral and tenofovir.  Diagnosed 3/15. Hep A immune, hep C negative.       Insurance coverage problems 10/23/2018     Priority: Low     Obesity affecting pregnancy 2018     Priority: Low     Overview Note:     Prepregnancy BMI 30.9       Short interval between pregnancies affecting pregnancy, antepartum 2018     Priority: Low     Overview Note:     17 -         Language barrier 10/22/2015     Priority: Low      Overview Note:     Needs [facility] .        (normal spontaneous vaginal delivery)        Objective     Blood pressure 98/50, pulse 72, resp. rate 10, weight 143 lb (64.9 kg), last menstrual period 2018, unknown if currently breastfeeding. Body mass index is 29.38 kg/m .  Heart: Regular rate and rhythm, no murmurs, rubs, or gallops.  Lungs: Clear to auscultation bilaterally.  No crackles.  Abdomen: Soft, nontender, bowel sounds normal.  No significant uterine tenderness.  Genitourinary: declined.  Extremities: Nontender, no significant edema.    Postpartum Depression Screen No data recorded.  Item 10 (suicidal thoughts): Negative.  Interpretation Guide: Possible Depression = 10 or greater.       Assessment and Plan     24 y.o.  here today for postpartum check.  1. Pap smear up to date.  2. Contraception Plan: strongly considering tubal ligation. Consent form done. Referred to OB/Gyn.  3. Discussed ideal body weight. Body mass index is 29.38 kg/m . The following high BMI interventions were performed this visit: encouragement to exercise and lifestyle education regarding diet.  4. Return to work  without restrictions after completion of routine maternity leave.  5. Next physical exam due in one year.    Order Summary                                                      1. Encounter for postpartum visit     2. Encounter for sterilization  Ambulatory referral to Obstetrics / Gynecology      No future appointments.    Completed by: Blanche Scruggs M.D., [facility]. 2019 9:21 AM.  This transcription uses voice recognition software, which may contain typographical errors.

## 2021-05-29 NOTE — TELEPHONE ENCOUNTER
Patient dropped off FMLA FORM FROM [employer] for Dr. Scruggs to fill out. Placed the original copies in the 's slot.    When forms are completed, patient would like it:    Fax to [phone] -no copy is needed      Please re-route task back to the  to shred the copied forms and complete the task. Thanks!

## 2021-05-29 NOTE — TELEPHONE ENCOUNTER
Check my out box. I completed all forms that were in my in basket yesterday.  If it's not in my out box, put it in my in box.

## 2021-05-30 VITALS — WEIGHT: 127 LBS | HEIGHT: 58 IN | BODY MASS INDEX: 26.66 KG/M2

## 2021-05-31 VITALS — WEIGHT: 141 LBS | BODY MASS INDEX: 29.47 KG/M2

## 2021-05-31 VITALS — HEIGHT: 58 IN | WEIGHT: 140 LBS | BODY MASS INDEX: 29.39 KG/M2

## 2021-05-31 VITALS — WEIGHT: 140 LBS | HEIGHT: 58 IN | BODY MASS INDEX: 29.39 KG/M2

## 2021-05-31 VITALS — WEIGHT: 148 LBS | BODY MASS INDEX: 30.93 KG/M2

## 2021-05-31 VITALS — BODY MASS INDEX: 30.31 KG/M2 | WEIGHT: 145 LBS

## 2021-05-31 VITALS — WEIGHT: 143 LBS | BODY MASS INDEX: 29.89 KG/M2

## 2021-05-31 VITALS — BODY MASS INDEX: 28.63 KG/M2 | WEIGHT: 137 LBS

## 2021-05-31 VITALS — WEIGHT: 149 LBS | BODY MASS INDEX: 31.14 KG/M2

## 2021-06-01 VITALS — BODY MASS INDEX: 30.93 KG/M2 | WEIGHT: 148 LBS

## 2021-06-01 VITALS — WEIGHT: 141.2 LBS | BODY MASS INDEX: 29.51 KG/M2

## 2021-06-02 VITALS — BODY MASS INDEX: 31.14 KG/M2 | WEIGHT: 149 LBS

## 2021-06-02 VITALS — BODY MASS INDEX: 32.4 KG/M2 | WEIGHT: 155 LBS

## 2021-06-02 VITALS — HEIGHT: 59 IN | WEIGHT: 164.5 LBS | BODY MASS INDEX: 33.16 KG/M2

## 2021-06-02 VITALS — WEIGHT: 148.75 LBS | BODY MASS INDEX: 31.09 KG/M2

## 2021-06-02 VITALS — BODY MASS INDEX: 31.98 KG/M2 | WEIGHT: 153 LBS

## 2021-06-02 VITALS — WEIGHT: 164 LBS | BODY MASS INDEX: 34.28 KG/M2

## 2021-06-02 VITALS — WEIGHT: 150 LBS | BODY MASS INDEX: 31.35 KG/M2

## 2021-06-02 VITALS — WEIGHT: 162 LBS | BODY MASS INDEX: 33.86 KG/M2

## 2021-06-02 VITALS — BODY MASS INDEX: 34.31 KG/M2 | WEIGHT: 167 LBS

## 2021-06-02 VITALS — WEIGHT: 163 LBS | BODY MASS INDEX: 34.07 KG/M2

## 2021-06-02 VITALS — BODY MASS INDEX: 33.44 KG/M2 | WEIGHT: 160 LBS

## 2021-06-03 VITALS — WEIGHT: 143 LBS | BODY MASS INDEX: 29.38 KG/M2

## 2021-06-04 VITALS
TEMPERATURE: 98.4 F | RESPIRATION RATE: 20 BRPM | SYSTOLIC BLOOD PRESSURE: 92 MMHG | WEIGHT: 152 LBS | HEART RATE: 76 BPM | DIASTOLIC BLOOD PRESSURE: 58 MMHG | BODY MASS INDEX: 31.23 KG/M2

## 2021-06-04 VITALS — WEIGHT: 152 LBS | BODY MASS INDEX: 31.91 KG/M2 | HEIGHT: 58 IN

## 2021-06-08 NOTE — PROGRESS NOTES
"  Subjective     Concerns:   none  Last Pap:   4/16/15  Contraception Plans:  condoms    Delivery Date:   16  Hospital:   [facility]  Delivered By:   Blanche Scruggs M.D.  Parity:      Gestational Age:  39+4    Labor:    spontaneous.  Delivery:   .  Labor and Delivery Complications: none.     Postpartum Therapy: none.    Infant Name:   Simone Ramirez.  Gender:  Boy.  Apgars:   8, 9.  Birth Weight:   6 lb 13 oz.  Breast/Bottle Feeding: formula only.   Complications: none.  Infant Care Provider: Blanche Scruggs M.D..    Objective     Blood pressure 100/64, pulse 70, resp. rate 18, height 4' 10\" (1.473 m), weight 127 lb (57.6 kg), last menstrual period 02/15/2016, unknown if currently breastfeeding. Body mass index is 26.54 kg/(m^2).  Heart: Regular rate and rhythm, no murmurs, rubs, or gallops.  Lungs: Clear to auscultation bilaterally.  No crackles.  Abdomen: Soft, nontender, bowel sounds normal.  No significant uterine tenderness.  Genitourinary: declined.  Extremities: Nontender, no significant edema.    Assessment and Plan     21 y.o.  here today for postpartum check.  1. Discussed ideal body weight. The following high BMI interventions were performed this visit: encouragement to exercise and lifestyle education regarding diet   2. Return to work: planned for next week. Letter written.  3. Contraception Plan: condoms. Rx sent for condoms and plan B. Recommend continuing prenatal vitamin for folic acid.  4. Postpartum Evaluation of Pregnancy/Chronic Health Conditions: chronic hepatitis B, high viral load. Discussed with patient need for labs every 6-12 months.  5. Referrals: none.  Next physical exam due in one year.    "

## 2021-06-09 NOTE — TELEPHONE ENCOUNTER
New Appointment Needed  What is the reason for the visit:    The patient has an appointment for her first ob on friday July 10th.  The patient noticed light pink color when wiping herself.  It has now happened twice.  The patient would like to be seen as soon as possibel.   Provider Preference: Any available  How soon do you need to be seen?: today  Waitlist offered?: No  Okay to leave a detailed message:  Yes

## 2021-06-09 NOTE — PROGRESS NOTES
"Wendy Alonzo is a 25 y.o. female who is being evaluated via a billable telephone visit.      The patient has been notified of following:     \"This telephone visit will be conducted via a call between you and your physician/provider. We have found that certain health care needs can be provided without the need for a physical exam.  This service lets us provide the care you need with a short phone conversation.  If a prescription is necessary we can send it directly to your pharmacy.  If lab work is needed we can place an order for that and you can then stop by our lab to have the test done at a later time.    Telephone visits are billed at different rates depending on your insurance coverage. During this emergency period, for some insurers they may be billed the same as an in-person visit.  Please reach out to your insurance provider with any questions.    If during the course of the call the physician/provider feels a telephone visit is not appropriate, you will not be charged for this service.\"    Patient has given verbal consent to a Telephone visit? Yes    What phone number would you like to be contacted at? [phone]    Patient would like to receive their AVS by AVS Preference: Oanh.    ____________________________    Virtual Visit - Telephone Encounter  [facility]  Family Medicine  Date of Service: 2020    Subjective:    Missed   Had lab and US confirming early SAB  Now: No bleeding, pain or cramping. No fever.    Discussed: watchful waiting, vaginal misoprostol, D&C. She isn't ready to make a decision and will discuss with her .      Objective:  Last menstrual period 2020, unknown if currently breastfeeding.   Speech is normal  Patient is calm  Lab on 2020   Component Date Value Ref Range Status     Beta-hCG, Quantitative 2020 17,800* 0 - 4 mlU/mL Final     Us Ob < 14 Weeks With Transvaginal    Result Date: 2020  EXAM: US OB < 14 WEEKS WITH " TRANSVAGINAL LOCATION: [facility] DATE/TIME: 2020 4:05 PM INDICATION: Follow up US 20 in ONE WEEK COMPARISON: Ultrasound 2020 TECHNIQUE: Transabdominal scans were performed. Endovaginal ultrasound was performed to better visualize the embryo. FINDINGS: UTERUS: The uterus measures 8.0 x 6.8 x 5.5 cm. There is a single intrauterine gestational sac with mean sac diameter 1.6 cm indicating a gestational age of 6 weeks 3 days. There is no fetal pole or yolk sac. RIGHT OVARY: Normal. LEFT OVARY: Normal.     1.  Single intrauterine gestational sac with estimated gestational age of 6 weeks 3 days. There is no fetal pole or yolk sac which should be seen at this stage of pregnancy and consistent with a blighted ovum/anembryonic pregnancy.     Us Ob < 14 Weeks With Transvaginal    Result Date: 2020  EXAM: US OB < 14 WEEKS WITH TRANSVAGINAL LOCATION: [facility] DATE/TIME: 2020 5:28 PM INDICATION: First Trimester Bleeding COMPARISON: None. TECHNIQUE: Transabdominal scans were performed. Endovaginal ultrasound was performed to better visualize the embryo. FINDINGS: UTERUS: Uterus measures 8.6 x 7.2 x 5.9 cm. There is a single intrauterine gestational sac with a mean sac diameter 1.2 cm indicating a gestational age of 6 weeks 0 days. A yolk sac or fetal pole are not identified. RIGHT OVARY: Normal. LEFT OVARY: Small corpus luteum cyst.     1.  Single intrauterine gestational sac with estimated gestational age of 6 weeks 0 days. A fetal pole is not identified which is typically seen at this stage of gestation and concerning for an anembryonic pregnancy/blighted ovum. Recommend repeat study in one week for confirmation. Correlation should also be made with beta hCG levels.      Assessment & Plan:  1. Missed  at 6 weeks 3 days. Unplanned pregnancy. Discussed management options with her. She will discuss with her  and call back tomorrow to let us know.       Order Summary                                                       1. Miscarriage        No future appointments.    Completed by: Blanche Scruggs M.D., [facility]. 7/7/2020 3:42 PM.  This transcription uses voice recognition software, which may contain typographical errors.  ____________________________  Start call: 3:42 PM   End call: 3:56 PM    Phone call duration:  14 minutes

## 2021-06-09 NOTE — TELEPHONE ENCOUNTER
,   If new O.B. U.S. needed I will need a new referral placed please  the one dated  06/29 in epic has already been used.  Thank you

## 2021-06-09 NOTE — TELEPHONE ENCOUNTER
Please call and help make appointment for patient to come in to be seen with a OB/FM provider or Johanna.

## 2021-06-09 NOTE — TELEPHONE ENCOUNTER
Patient Returning Call  Reason for call:  Patient's , Kendra called back to state the patient would like to do the dilation and curettage procedure.  Information relayed to patient:  n/a  Patient has additional questions:  No  If YES, what are your questions/concerns:  n/a  Okay to leave a detailed message?: No

## 2021-06-09 NOTE — TELEPHONE ENCOUNTER
Scheduled [facility] dr tim 07/14/@1015 mplwd called with the LL no answer lmcb to [phone] if unable to go to appt 07/14

## 2021-06-09 NOTE — TELEPHONE ENCOUNTER
Lab and f/u visit already scheduled.    Specialty schedulers please assist to schedule patient for US per

## 2021-06-09 NOTE — TELEPHONE ENCOUNTER
New Appointment Needed  What is the reason for the visit:    Back pain and abdomin pain   Pregnancy Confirmation Appt Needed  When was the first day of your last menstrual cycle?: Last day of April   Have you done a home pregnancy test?: Yes: 2 test .  Provider Preference: OB  How soon do you need to be seen?: today  Waitlist offered?: No  Okay to leave a detailed message:  Yes

## 2021-06-09 NOTE — TELEPHONE ENCOUNTER
Please help patient schedule for an appointment today if possible per Dr. Young (spoke with him on the phone). Will need a [facility] . Thanks!

## 2021-06-09 NOTE — ANESTHESIA PREPROCEDURE EVALUATION
Anesthesia Evaluation      Patient summary reviewed   No history of anesthetic complications     Airway   Mallampati: II  Neck ROM: full   Pulmonary - negative ROS and normal exam                          Cardiovascular - negative ROS and normal exam   Neuro/Psych - negative ROS     Endo/Other - negative ROS   (+) obesity (bmi 32),      GI/Hepatic/Renal - negative ROS      Other findings: Missed AB        Dental - normal exam                        Anesthesia Plan  Planned anesthetic: MAC  Versed/fentanyl/propofol  Ketamine PRN  Decadron/zofran    ASA 2   Induction: intravenous   Anesthetic plan and risks discussed with: patient and  services used  Anesthesia plan special considerations: antiemetics,   Post-op plan: routine recovery

## 2021-06-09 NOTE — ANESTHESIA POSTPROCEDURE EVALUATION
Patient: Wendy Alonzo  Procedure(s):  SUCTION DILATION AND CURETTAGE  Anesthesia type: MAC    Patient location: Phase II Recovery  Last vitals:   Vitals Value Taken Time   /63 7/17/2020 12:45 PM   Temp 36.4  C (97.5  F) 7/17/2020 11:40 AM   Pulse 71 7/17/2020 12:50 PM   Resp 16 7/17/2020 12:15 PM   SpO2 97 % 7/17/2020 12:50 PM   Vitals shown include unvalidated device data.  Post vital signs: stable  Level of consciousness: awake and responds to simple questions  Post-anesthesia pain: pain controlled  Post-anesthesia nausea and vomiting: no  Pulmonary: unassisted  Cardiovascular: stable  Hydration: adequate  Anesthetic events: no    QCDR Measures:  ASA# 11 - Hattie-op Cardiac Arrest: ASA11B - Patient did NOT experience unanticipated cardiac arrest  ASA# 12 - Hattie-op Mortality Rate: ASA12B - Patient did NOT die  ASA# 13 - PACU Re-Intubation Rate: NA - No ETT / LMA used for case  ASA# 10 - Composite Anes Safety: ASA10A - No serious adverse event    Additional Notes:

## 2021-06-09 NOTE — TELEPHONE ENCOUNTER
----- Message from Blanche Scruggs MD sent at 6/29/2020  5:48 PM CDT -----  I attempted to call Wendy, but got voicemail. I left a message via  to call back for results.  When she calls I would like to speak to her.

## 2021-06-09 NOTE — TELEPHONE ENCOUNTER
Who is calling: Melvin [facility] Hep  B  Reason for Call:  Please call  with due date.  Thanks.    Date of last appointment with primary care: na  Okay to leave a detailed message: Yes

## 2021-06-09 NOTE — TELEPHONE ENCOUNTER
Called patient with results.    Cramping and bleeding: both gone.    Discussed that hcg level and US are both consistent with missed miscarriage.  Plan:    Recheck quant hcg    Recheck ultrasound in one week    CSS:   Please schedule:    Lab only today    Follow up virtual visit late next week    And ask schedulers to schedule follow up US next week.    Office Visit on 06/29/2020   Component Date Value Ref Range Status     Beta-hCG, Quantitative 06/29/2020 17,462* 0 - 4 mlU/mL Final     HIV Antigen / Antibody 06/29/2020 Negative  Negative Final     Culture 06/29/2020 No Growth   Final     Chlamydia trachomatis, Amplified D* 06/29/2020 Negative  Negative Final     Neisseria gonorrhoeae, Amplified D* 06/29/2020 Negative  Negative Final     Amphetamines 06/29/2020 Screen Negative  Screen Negative Final     Benzodiazepines 06/29/2020 Screen Negative  Screen Negative Final     Opiates 06/29/2020 Screen Negative  Screen Negative Final     Phencyclidine 06/29/2020 Screen Negative  Screen Negative Final     THC 06/29/2020 Screen Negative  Screen Negative Final     Barbiturates 06/29/2020 Screen Negative  Screen Negative Final     Cocaine Metabolite 06/29/2020 Screen Negative  Screen Negative Final     Oxycodone 06/29/2020 Screen Negative  Screen Negative Final     Creatinine, Urine 06/29/2020 35.9  mg/dL Final     Treponema Antibody (Syphilis) 06/29/2020 Negative  Negative Final     WBC 06/29/2020 7.6  4.0 - 11.0 thou/uL Final     RBC 06/29/2020 4.74  3.80 - 5.40 mill/uL Final     Hemoglobin 06/29/2020 12.8  12.0 - 16.0 g/dL Final     Hematocrit 06/29/2020 40.5  35.0 - 47.0 % Final     MCV 06/29/2020 85  80 - 100 fL Final     MCH 06/29/2020 27.0  27.0 - 34.0 pg Final     MCHC 06/29/2020 31.6* 32.0 - 36.0 g/dL Final     RDW 06/29/2020 13.2  11.0 - 14.5 % Final     Platelets 06/29/2020 274  140 - 440 thou/uL Final     MPV 06/29/2020 9.6  8.5 - 12.5 fL Final     Neutrophils % 06/29/2020 67  50 - 70 % Final     Lymphocytes %  06/29/2020 24  20 - 40 % Final     Monocytes % 06/29/2020 6  2 - 10 % Final     Eosinophils % 06/29/2020 2  0 - 6 % Final     Basophils % 06/29/2020 0  0 - 2 % Final     Neutrophils Absolute 06/29/2020 5.1  2.0 - 7.7 thou/uL Final     Lymphocytes Absolute 06/29/2020 1.9  0.8 - 4.4 thou/uL Final     Monocytes Absolute 06/29/2020 0.5  0.0 - 0.9 thou/uL Final     Eosinophils Absolute 06/29/2020 0.1  0.0 - 0.4 thou/uL Final     Basophils Absolute 06/29/2020 0.0  0.0 - 0.2 thou/uL Final     Rubella Antibody, IgG 06/29/2020 Positive   Final     Hepatitis B Surface Ag 06/29/2020 Positive, Previous Confirm* Negative Final     HML ABO/Rh Typing 06/29/2020 B POS   Final     HML ABO/Rh Repeat Typing 06/29/2020 B POS   Final     HML Antibody Screen 06/29/2020 Negative  Negative Final     Pregnancy Test, Urine 06/29/2020 Positive* Negative Final

## 2021-06-09 NOTE — ANESTHESIA CARE TRANSFER NOTE
Last vitals:   Vitals:    07/17/20 1140   BP: 118/63   Pulse: 85   Resp: 16   Temp: 36.4  C (97.5  F)   SpO2: 100%     Patient's level of consciousness is drowsy  Spontaneous respirations: yes  Maintains airway independently: yes  Dentition unchanged: yes  Oropharynx: oropharynx clear of all foreign objects    QCDR Measures:  ASA# 20 - Surgical Safety Checklist: WHO surgical safety checklist completed prior to induction    PQRS# 430 - Adult PONV Prevention: 4558F - Pt received => 2 anti-emetic agents (different classes) preop & intraop  ASA# 8 - Peds PONV Prevention: NA - Not pediatric patient, not GA or 2 or more risk factors NOT present  PQRS# 424 - Hattie-op Temp Management: 4559F - At least one body temp DOCUMENTED => 35.5C or 95.9F within required timeframe  PQRS# 426 - PACU Transfer Protocol: - Transfer of care checklist used  ASA# 14 - Acute Post-op Pain: ASA14B - Patient did NOT experience pain >= 7 out of 10

## 2021-06-09 NOTE — TELEPHONE ENCOUNTER
Called and [facility] with [facility] at [facility]. Informed Melvin of due date and SAB.

## 2021-06-09 NOTE — TELEPHONE ENCOUNTER
The order is there. I ordered one 6/29 in am, she had that one. Then I ordered a follow up on 6/29 in the pm for next week. She hasnt had that one yet.

## 2021-06-09 NOTE — PROGRESS NOTES
Subjective:    Vaginal bleeding started yesterday morning.  It was small in amount, pink and only on the toilet paper.   It continued during the day and was gone by evening.  Went to work today and had a little more pink blood on the toilet paper.    She did develop lower abdominal and lower back pain last Wednesday, .  It felt like the chair was uncomfortable, but didn't improve after changing chairs.  It happened again on Thursday.  No pain since Thursday.    Unplanned pregnancy. Sure LMP 4.30.20    OB ROS:  Abnormal vaginal discharge: None.  Bleeding: Yes. see above.    Objective:  Vitals:    20 1511   BP: 92/58   Pulse: 76   Resp: 20   Temp: 98.4  F (36.9  C)      Exam: see flowsheet. Abdomen is non-tender. FHR: 0.   Recent Results (from the past 24 hour(s))   Pregnancy (Beta-hCG, Qual), Urine   Result Value Ref Range    Pregnancy Test, Urine Positive (!) Negative    Us Ob < 14 Weeks With Transvaginal    Result Date: 2020  EXAM: US OB < 14 WEEKS WITH TRANSVAGINAL LOCATION: [facility] DATE/TIME: 2020 5:28 PM INDICATION: First Trimester Bleeding COMPARISON: None. TECHNIQUE: Transabdominal scans were performed. Endovaginal ultrasound was performed to better visualize the embryo. FINDINGS: UTERUS: Uterus measures 8.6 x 7.2 x 5.9 cm. There is a single intrauterine gestational sac with a mean sac diameter 1.2 cm indicating a gestational age of 6 weeks 0 days. A yolk sac or fetal pole are not identified. RIGHT OVARY: Normal. LEFT OVARY: Small corpus luteum cyst.     1.  Single intrauterine gestational sac with estimated gestational age of 6 weeks 0 days. A fetal pole is not identified which is typically seen at this stage of gestation and concerning for an anembryonic pregnancy/blighted ovum. Recommend repeat study in one week for confirmation. Correlation should also be made with beta hCG levels.     Assessment/Plan:    25 y.o.  at 8w4d.    First trimester bleeding. Currently 8  weeks 4 days by sure LMP of 4/30/20.  Obtain labs as below.  Obtain ultrasound.  Rh positive.    Future Appointments   Date Time Provider Department Center   7/10/2020  1:00 PM Johanna Isbell PA-C [facility] OB [facility]      Order Summary                                                      1. First trimester bleeding  Beta-hCG, Quantitative    US OB < 14 Weeks   2. Encounter for supervision of other normal pregnancy in first trimester  Beta-hCG, Quantitative    Prenatal Screen [place] and Hemogram    HIV Antigen/Antibody Screening [place]    Culture, Urine    Chlamydia trachomatis & Neisseria gonorrhoeae, Amplified Detection    Hemoglobinopathy/Thalassemia [place]    Lead, Blood    QTF-Mycobacterium tuberculosis by QuantiFERON-TB Gold Plus    prenatal vit-iron fum-folic ac (PRENATAL VITAMIN) 27 mg iron- 0.8 mg Tab tablet    Drugs of Abuse 1,Urine    Syphilis Screen, [place]    HM1 (CBC with Diff)    Rubella Antibody, IgG    Hepatitis B Surface Antigen (HBsAG)    HML ABO/Rh Typing    HML Antibody Screen    Pregnancy (Beta-hCG, Qual), Urine    CANCELED: US OB < 14 Weeks   3. Overweight (BMI 25.0-29.9)     4. Pelvic pain affecting pregnancy in first trimester, antepartum  Beta-hCG, Quantitative    US OB < 14 Weeks      Completed by: Blanche Scruggs M.D., [facility]. 6/29/2020 3:31 PM.  Total time: 15 minutes. Greater than 50% spent in counseling and coordination of care regarding topics, above.

## 2021-06-12 NOTE — PROGRESS NOTES
Chief Complaint:  Chief Complaint   Patient presents with     preg confirmation     preg #3, had a positive home upt 2 wks ago, has 1 boy and 1 girl     HPI:   Wendy Alonzo is a 22 y.o. female is here for pregnancy intake.  She is .  Patient's last menstrual period was 2017 (approximate).  Last delivery 2016.  She thinks she started to have normal monthly periods in 2016.  Positive home pregnancy test 2 weeks ago.    Past medical history: reviewed and updated.  No past medical history on file.  Past Surgical History:   Procedure Laterality Date     NO PAST SURGERIES         Current Outpatient Prescriptions:      condoms - male Misc, Dispense max allowable. Use one condom PRN with intercourse for contraception, Disp: 24 each, Rfl: 11     prenatal vit-iron fum-folic ac (PRENATAL VITAMIN) 27 mg iron- 0.8 mg Tab, Take 1 tablet by mouth once daily., Disp: 100 tablet, Rfl: 3    Social:  Social History   Substance Use Topics     Smoking status: Never Smoker     Smokeless tobacco: Never Used     Alcohol use No       OBJECTIVE:  No Known Allergies  Vitals:    17 0818   BP: 90/60   Pulse: 72     Body mass index is 29.47 kg/(m^2).    Vital signs stable as recorded above  General: Patient is alert and oriented x 3, in no apparent distress  Fetal Exam: Fundal height was not palpable, fetal heart tones are not heard.    Results:  Results for orders placed or performed in visit on 17   Pregnancy, Urine   Result Value Ref Range    Pregnancy Test, Urine Positive (!) Negative     Other screening pregnancy lab work is ordered and pending.    Patient scored a  5/30 on [place]  Depression Screen.    Assessment and Plan:  1. Pregnancy Intake Appointment.  Wendy Alonzo is 22 y.o. and .  Patient's last menstrual period was 2017 (approximate).  Estimated Date of Delivery: 18  She will be seeing Dr. Scruggs for OB care.  Screening pregnancy lab work was drawn.  Prenatal vitamins  prescribed.  Problem list and current medications reviewed and updated.  Dating ultrasound ordered.  Prenatal info packet given.  First Trimester Screening was reviewed, along with timing of studies.  She and her  are unsure if they would like this done.  They will call if they want to schedule it.    Follow up in 2-4 weeks.  Please see OB Episode for complete details.  Visit was approximately 40 minutes, greater than 50% of time spent in face-to-face counseling and coordination of care.    This dictation uses voice recognition software, which may contain typographical errors.

## 2021-06-12 NOTE — PROGRESS NOTES
" Subjective    Wendy Alonzo is a 22 y.o. female who presents for vaginal bleeding.    The patient is a 22-year-old  002 at 15 weeks and 6 days by LMP consistent with 11 week ultrasound.    She reports onset of vaginal bleeding on Saturday, 2017.  At first, it was light pink and only in the tissue.  And increased.  It got darker color after a day, and is now pink again.  It is painless.  Her first episode of vaginal bleeding during the pregnancy.  No fetal movement yet.  Review of 11 week ultrasound is normal.  No comment on subchorionic hemorrhage.  Blood type is B+.  Does not need RhoGam.         Objective    Blood pressure 90/58, pulse 66, resp. rate 18, height 4' 10\" (1.473 m), weight 140 lb (63.5 kg), last menstrual period 2017, not currently breastfeeding. Body mass index is 29.26 kg/(m^2). Patient reports that she has never smoked. She has never used smokeless tobacco.    Gen: Alert, no apparent distress.  Heart: Regular rate and rhythm, no murmurs.  Lungs: Clear to auscultation bilaterally, no increased work of breathing.  Abdomen: Soft, non-tender, non-distended, bowel sounds normal.  Uterus measures 16 cm.  Fetal heart rate 145 bpm  Genitourinary: Vulva and vagina are normal in appearance the cervix has a 1.5 x 2.5 cm purpleish tissue coming from the eyes.  Most likely a mature clot.  There is yellowish white mucus on this.  There is a small amount of light pink milky vaginal discharge.  No excessive pain with sterile speculum exam.  Extremities: No clubbing, cyanosis, edema.    Results for orders placed or performed in visit on 17   Urinalysis, OB Screen (ketones, glucose, protein)   Result Value Ref Range    Glucose, UA Negative Negative    Ketones, UA Negative Negative    Protein, UA Trace (!) Negative mg/dL     No results found.       Assessment & Plan      Wendy is a 22 y.o. female who is here today for Vaginal Bleeding (since Saturday light to heavy bleeding)      1. Painless second " trimester bleeding.  Blood clot in cervical os, fetal heart tones present.  Sent for OB ultrasound to evaluate cervical length and source of bleeding.  Rh+, RhoGam not indicated.  Recommended pelvic rest.  Follow-up in 1 week to reevaluate fetal heart tones.  Discussed case with Dr. Leisa Quezada, [facility], who agrees with management plan.    1. Second trimester bleeding  US OB > = 14 Weeks   2. Normal pregnancy, second trimester  Influenza, Seasonal,Quad Inj, 36+ MOS   3. Language barrier     4. Short interval between pregnancies affecting pregnancy in second trimester, antepartum             This transcription uses voice recognition software, which may contain typographical errors.

## 2021-06-12 NOTE — PROGRESS NOTES
First OB visit today.      Problems addressed today include:  Chronic hepatitis B with very high viral load.  Recommended referral to GI for further evaluation and consideration of treatment with antiviral medication.  The patient declines referral.  Discussed increased risk of transmission of hepatitis B and lifelong consequences for the baby.  The patient declines.  Conversation was interpreted by professional [facility] [place]   also has hepatitis B infection.  Discussed the importance of immunization of all close contacts for both hepatitis B and hepatitis A.    I reviewed the following components of the patient chart today:    Active problems    Past Medical History    Medications    Allergies    Family History    Social History    Genetic Questionairre    Prenatal Labs    Prenatal Ultrasound  Physical exam performed, see physical exam portion of chart.    Total time 40 minutes, >50% spent in counseling and coordination of care regarding new pregnancy and review of patient's current health status and pregnancy.

## 2021-06-13 NOTE — PROGRESS NOTES
Assessment/Plan:    22 y.o.  at 19w3d.    Anatomy US ordered. Declines quad screen.    Discussed educational topics on pregnancy checklist and delivery preferences, as documented.    Subjective:  Declines quad ascren    OB ROS:    Headache: None.    Vision change: None.    Abnormal vaginal discharge: None.    Bleeding: None.    Fetal movement: Normal.    Objective:  Reviewed vitals. See flowsheet.  Exam. See flowsheet.   Recent Results (from the past 24 hour(s))   Urinalysis, OB Screen (ketones, glucose, protein)   Result Value Ref Range    Glucose, UA Negative Negative    Ketones, UA Negative Negative    Protein, UA Negative Negative mg/dL        Total time: 15 minutes.   Greater than 50% spent in counseling and coordination of care regarding topics, above.

## 2021-06-13 NOTE — PROGRESS NOTES
Assessment/Plan:    22 y.o.  at 23w3d.    Discussed 1 hr GCT at next visit.    Discussed educational topics on pregnancy checklist and delivery preferences, as documented.    Subjective:  No problems  Discussed fetal intracardiac echogenic focus. Patient and her  decline further testing.    OB ROS:    Headache: None.    Vision change: None.    Abnormal vaginal discharge: None.    Bleeding: None.    Fetal movement: Normal.    Objective:  Reviewed vitals. See flowsheet.  Exam. See flowsheet.   No results found for this or any previous visit (from the past 24 hour(s)).     Total time: 15 minutes.   Greater than 50% spent in counseling and coordination of care regarding topics, above.

## 2021-06-13 NOTE — PROGRESS NOTES
The patient reports that she has ongoing vaginal bleeding.  It is a very small amount.  She has a small amount of pink or brown tinged mucus when she wipes.  She is not even using a panty liner now.  She has no abdominal pain.  No cramping.  She has not yet felt any fetal movement.  Reviewed her ultrasound result with her which shows a normal pregnancy with an anterior placenta.  Cervical edges 5 cm from the cervix.  There is a small amount of debris in the internal office.    Exam: Uterus is nontender.  Fetal heart rate is 150 bpm and placental soufflé is heard anteriorly.  Sterile speculum exam performed.  There is light brown tinged vaginal mucus and the now 2.5 x 1.5 cm mass, which is presumably an organized clot with mucus on it is still present at the cervical office.  Its appearance is light brown/purple in color with a white/brown tinged mucus overlying.  The cervix is still closed.    Plan: Continue to monitor.  She will return if she has increasing bleeding, or concerns about her baby.  Otherwise her next prenatal visit is due on 10/6/2017.  He is Rh+ and RhoGam is not indicated.  I see that somebody had called from [facility] of public health earlier today.  I have not received a phone message yet.  I did attempt to call them back, but got voicemail and could not confirm the caller's identity to feel comfortable leaving a message with her pregnancy status.  Her due date is 2/27/2018.  She does have known chronic hepatitis B and plans to deliver at [facility].

## 2021-06-14 NOTE — PROGRESS NOTES
"Assessment/Plan:    22 y.o.  at 24w6d.     contractions with 1 cm cervical dilation. The patient declines hospital admission, but is willing to have NST to evaluate for contraction frequency and betamethasone. She was instructed to go to [facility] immediately following our visit today.    Discussed educational topics on pregnancy checklist and delivery preferences, as documented.    Subjective:    \"Pressure down below\"  \"If I walk and the presure comes on I feel tight from the bottom to the top of my abdomen and it radiates to back and down into my thighs\"  Started Friday 11/10/17  She describes the discomfort as \"crampy\"  It is located on the top and sides of her abdomen  She feels one wave, then it's gone. She doesn't know how long it lasts. Maybe 1-2 times per day  Has normal vaginal discharge  Normal urination    OB ROS:    Headache: None.    Vision change: None.    Abnormal vaginal discharge: None.    Bleeding: None.    Fetal movement: Normal.    Objective:  Reviewed vitals. See flowsheet.  Exam. See flowsheet.   Recent Results (from the past 24 hour(s))   Urinalysis, OB Screen (ketones, glucose, protein only)   Result Value Ref Range    Glucose, UA Negative Negative    Ketones, UA Negative Negative    Protein, UA Negative Negative mg/dL        Total time: 20 minutes.   Greater than 50% spent in counseling and coordination of care regarding topics, above.    "

## 2021-06-15 NOTE — PROGRESS NOTES
Subjective     Concerns:   Follow up placental pathology  Last Pap:   4/16/15 NILM  Contraception Plans:  Undecided    Pregnancy Problems:    Patient Active Problem List    Diagnosis Date Noted      delivery, delivered 2017     Priority: High      labor 2017     Priority: High     Overview Note:     Onset: 24w3d.   Fetal fibronectin positive 17.  Admitted to [facility] on 17 @ 24w6d, given betamethasone, magnesium and terbutaline tocolysis.         High-risk pregnancy 2017     Priority: High     Overview Note:     22 y.o.   Baby's Gender: BOY, Genetic screening: declined  : Berenice Ramirez  Flu shot: 17, Tdap:  ___, Rubella immune, Varicella previously immunized, Hepatitis B sAg positive (chronic hepatitis B).  Employment:  Medical assembly           Group B Streptococcus carrier, +RV culture, currently pregnant 2017     Priority: Medium     Overview Note:     GBS + swab 17       Chronic hepatitis B 2015     Priority: Medium     Overview Note:     New diagnosis 3/15.  Very high viral load, HBeAg+. Patient declines GI referral and tenofovir.  Hep A immune, hep C negative.       Echogenic intracardiac focus of fetus on prenatal ultrasound 10/13/2017     Priority: Low     Overview Note:     See phone call 10/13/2017 . Parents decline follow up testing 11/3/2017.       Short interval between pregnancies affecting pregnancy in second trimester, antepartum 2017     Priority: Low     Overview Note:      11/15 & .       Language barrier 10/22/2015     Priority: Low     Overview Note:     Needs ong .         Delivery Date:   17  Hospital:   [facility]  Delivered By:   Blanche Scruggs M.D.  Parity:      Gestational Age:  25+2    Labor:    spontaneous.  Delivery:   .  Labor and Delivery Complications:  delivery.     Postpartum Therapy: none.    Infant Name:   Naresh.  Gender:  Boy.  Apgars:    8,8.  Birth Weight:   1 lb 14 oz.  Breast/Bottle Feeding: formula only.   Complications: complications of prematurity.  Infant Care Provider: [facility] at [facility]. now 31 weeks corrected age..    Objective     Blood pressure 96/52, pulse 74, resp. rate 20, weight 137 lb (62.1 kg), last menstrual period 2017, unknown if currently breastfeeding. Body mass index is 28.63 kg/(m^2).  Heart: Regular rate and rhythm, no murmurs, rubs, or gallops.  Lungs: Clear to auscultation bilaterally.  No crackles.  Abdomen: Soft, nontender, bowel sounds normal.  No significant uterine tenderness.  Genitourinary: declined.  Extremities: Nontender, no significant edema.    Postpartum Depression Screen EPDS Total Score: 0 (2017  3:52 PM).  Item 10 (suicidal thoughts): Negative.  Interpretation Guide: Possible Depression = 10 or greater.     Placental pathology  PLACENTA, DELIVERY:     - SECOND TRIMESTER PLACENTA (185 GRAMS) WITH MILD ACUTE CHORIOAMNIONITIS       AND MODERATE-TO-SEVERE ACUTE DECIDUITIS; ASSOCIATED FOCAL HEMORRHAGE     - DIARTERIAL, TRIVASCULAR UMBILICAL CORD WITHOUT FUNISITIS      Assessment and Plan     22 y.o.  here today for postpartum check.  1. Spontaneous  delivery.  Placenta pathology suggests possible bacterial contribution.  Would recommend progesterone with subsequent pregnancy.  Discussed risk factors of short interpregnancy interval, insufficient weight gain, prolonged standing.  2. Discussed ideal body weight. The following high BMI interventions were performed this visit: encouragement to exercise and lifestyle education regarding diet.  3. Return to work: n/a, full time mother.  4. Contraception Plan: undecided. Will call if she wants anything.  5. Postpartum Evaluation of Pregnancy/Chronic Health Conditions: Discussed placenta pathology.  6. Referrals: none.  7. Next physical exam due in one year.

## 2021-06-16 PROBLEM — O03.9 MISCARRIAGE: Status: ACTIVE | Noted: 2020-06-29

## 2021-06-16 NOTE — PROGRESS NOTES
Chief Complaint   Patient presents with     Ear Pain     Since Thursday     Mass     on top of head and left ear         HPI    Patient is here for:    #1. Scalp pain - x 3 days, mild tenderness, with swelling. No injury. No fever, chills.    #2. Left ear pain - x 3 days, mostly outside of the left ear, with mild nasal congestion. No ear discharge. Pain worsens with opening of jaw.     ROS: Pertinent ROS noted in HPI.     No Known Allergies    Patient Active Problem List   Diagnosis     Chronic hepatitis B     Language barrier       Family History   Problem Relation Age of Onset     Hepatitis Mother      Chronic hepatitis B     Tuberculosis Mother      No Medical Problems Father      No Medical Problems Son      No Medical Problems Daughter      Premature birth Son 0     25 weeks 2 days       Social History     Social History     Marital status: Single     Spouse name: Kendra [facility]     Number of children: 3     Years of education: N/A     Occupational History     Production (standing only) [employer]     Social History Main Topics     Smoking status: Never Smoker     Smokeless tobacco: Never Used     Alcohol use No     Drug use: No     Sexual activity: Yes     Partners: Male     Birth control/ protection: None      Comment: Undecided re: contraception 12/17     Other Topics Concern     Not on file     Social History Narrative    : Berenice Son. Daughter: Shira Ramirez 2015, sons: Simone [facility] 2016 & Naresh 2017.     Objective:    Vitals:    03/10/18 0810   BP: (!) 88/62   Pulse: 74   Temp: 98.3  F (36.8  C)   SpO2: 98%       Gen: NAD  Head: pain to palpation at left TMJ, pain reproduced with opening of jaw/mouth. There is also a 1.5 cm indurated, slightly erythematous area above the frontal hairline that is tender to palpation without fluctuance.   Oropharynx: clear without lesions  Ears: TMs clear without effusions, canals normal without cerumen  Neck:NAD  CV: RRR, no M, R, G  Pulm: CTAB      Cellulitis of  scalp  -     cephalexin (KEFLEX) 500 MG capsule; Take 1 capsule (500 mg total) by mouth 4 (four) times a day for 10 days.    TMJ (temporomandibular joint syndrome)  -     ibuprofen (ADVIL,MOTRIN) 600 MG tablet; Take 1 tablet (600 mg total) by mouth every 6 (six) hours as needed for pain.      F/u as directed.

## 2021-06-18 NOTE — LETTER
Letter by Blanche Scruggs MD at      Author: Blanche Scruggs MD Service: -- Author Type: --    Filed:  Encounter Date: 2/5/2019 Status: (Other)       February 5, 2019     Patient: Wendy Alonzo   YOB: 1995   Date of Visit: 2/5/2019       To Whom It May Concern:    It is my medical opinion that Wendy Alonzo should begin her absence from work, for routine maternity leave, on 3/23/19. Her due date is 3/30/19. She is planning a 12 week maternity leave.    If you have any questions or concerns, please don't hesitate to call.    Sincerely,        Electronically signed by Blanche Scruggs MD

## 2021-06-18 NOTE — LETTER
Letter by Blanche Scruggs MD at      Author: Blanche Scruggs MD Service: -- Author Type: --    Filed:  Encounter Date: 12/28/2018 Status: (Other)       Wendy Alonzo  [address] [place] [address]             January 3, 2019         Dear Ms. Alonzo,    Below are the results from your recent visit:    Resulted Orders   Urinalysis, OB Screen (ketones, glucose, protein only)   Result Value Ref Range    Glucose, UA Negative Negative    Ketones, UA Negative Negative    Protein, UA Trace (!) Negative mg/dL   Hemoglobin   Result Value Ref Range    Hemoglobin 11.9 (L) 12.0 - 16.0 g/dL   Glucose,Gestational Challenge (1 Hour)   Result Value Ref Range    Glucose, Gestational Challenge 1hr 97 70 - 139 mg/dL    Patient Fasting > 8hrs? No        No diabetes or anemia. Normal.     Please call with questions or contact us using [employer].    Sincerely,        Electronically signed by Blanche Scruggs MD

## 2021-06-18 NOTE — LETTER
Letter by Blanche Scruggs MD at      Author: Blanche Scruggs MD Service: -- Author Type: --    Filed:  Encounter Date: 2019 Status: (Other)       2019     Patient: Wendy Alonzo   YOB: 1995   Date of Visit: 2019     To Whom It May Concern:    It is my medical opinion that Wendy Alonzo should begin short term disability on 19, due to symptoms of  labor and history of very early  delivery with previous pregnancy.    History of  delivery, currently pregnant  O09.219   High risk pregnancy due to history of  labor in second trimester  O09.212       If you have any questions or concerns, please don't hesitate to call.    Sincerely,        Electronically signed by Blanche Scruggs MD

## 2021-06-18 NOTE — PATIENT INSTRUCTIONS - HE
Patient Instructions by Blanche Scruggs MD at 7/7/2020  3:40 PM     Author: Blanche Scruggs MD Service: -- Author Type: Physician    Filed: 7/7/2020  3:52 PM Encounter Date: 7/7/2020 Status: Signed    : Blanche Scruggs MD (Physician)         Patient Education     Understanding Miscarriage: Possible Causes  Miscarriage is common, but finding its cause may not be easy. If a cause can be found, its likely to be a problem with the baby or the structure of the uterus. Other factors cause miscarriage, but they are less common.  Problems with the baby  Either of the following problems with the baby can cause a miscarriage:    There is a problem with the babys chromosomes (genes that carry the information needed for life).    Birth defects  Problems with the uterus or cervix  Any of the following problems with the uterus or cervix can cause a miscarriage:    The uterus may be divided (have a septum), or have fibroids or adhesions.    The lining of the uterus may be too thin for the fertilized egg to implant.    The cervix may be too weak to support the weight of a pregnancy.  Other factors  Any of the following problems can cause a miscarriage:    A serious illness, such as uncontrolled diabetes mellitus.     A bad injury, perhaps during a car accident.    Exposure to toxins or radiation.  What does not cause miscarriage  Plenty of myths and old wives tales try to explain the cause of miscarriage. But they are fiction--not fact. None of the following activities causes miscarriage:    Carrying groceries    Lifting a small child    Wearing high heels    Coloring your hair    Having sex    Vacuuming   Working outside the home    Being a vegetarian    Eating spicy foods    Having a Pap smear    Riding a horse or a bicycle    Wishing away or denying a pregnancy       Date Last Reviewed: 6/1/2016 2000-2019 [employer]. [address] [place] [address], [place] [address]. All rights reserved. This information is  not intended as a substitute for professional medical care. Always follow your healthcare professional's instructions.

## 2021-06-20 ENCOUNTER — HEALTH MAINTENANCE LETTER (OUTPATIENT)
Age: 26
End: 2021-06-20

## 2021-06-20 NOTE — PROGRESS NOTES
MTM Consult Encounter    Wendy Alonzo is a 23 y.o. female referred for a clinical pharmacist consult from Dr. Scruggs for Cecily education. Met with patient again - previous visit was given information about the injection. Patient and  have no concerns and plan to go forward with treatment. Pre-approval was completed    Plan:  1. Educated about what to expect with injection. Educated about possible adverse effects. Educated about benefits  2. Scheduled for OB visit in 2 weeks. Weekly CSS injection visits scheduled x 20 doses.   3. Message sent to support staff to ensure medication is available in clinic    Follow up:   ANDRE Lee, PharmD, BCACP  MTM Pharmacist   [facility]      Current Outpatient Prescriptions   Medication Sig Dispense Refill     prenatal vit-iron fum-folic ac (PRENATAL VITAMIN) 27 mg iron- 0.8 mg Tab Take 1 tablet by mouth once daily. 100 tablet 3     No current facility-administered medications for this visit.

## 2021-06-20 NOTE — PROGRESS NOTES
Subjective:  Discussed intramuscular progesterone with the patient.  Specifically, discussed the importance of not missing any doses once it is started.  Discussed weekly injections from 16 through 36 weeks.  She works Monday through Thursday, but has Friday off.  Discussed the importance of not missing injections because of Friday and difficult to get in over the weekend.    At our last visit, I put in referral to [facility] for OB consultation due to history of  delivery at 25 weeks with last pregnancy.  She has not yet scheduled that appointment.  Scuffs the importance of the visit and to send her back to our  today to schedule again.    Discussed nutrition.  Weight gain is a little bit below where we want it, especially with closely spaced pregnancies and history of  delivery.  Discussed high protein diet.  Patient notes that she is allergic to walnuts.  In the past, when she has had walnuts, she has had itchy eyes and itchy throat.  No swelling or breathing problems.  She has previously and continues to tolerate peanuts well.    OB ROS:   Headache: None.   Vision change: None.   Abnormal vaginal discharge: None.   Bleeding: None.   Fetal movement: None.     Objective:  Reviewed vitals. Exam - see flowsheet.     Recent Results (from the past 24 hour(s))   Urinalysis, OB Screen (ketones, glucose, protein)   Result Value Ref Range    Glucose, UA Negative Negative    Ketones, UA Negative Negative    Protein, UA Negative Negative mg/dL        Assessment/Plan:    23 y.o.  at 14w6d.    History of  delivery.  Start IM progesterone shots at 16 weeks.  Patient met with pharmacist today, who will procure supply for her.  Patient sent to  today to schedule with OB for consultation due to history of  delivery.    [place] allergy noted in patient chart.    Chronic hepatitis B with high viral load.  Patient not interested in seeing GI or taking antiviral medication.    The  whole family is going to be coming in for flu shots in 2 weeks.  1. Supervision of other high risk pregnancies, second trimester  Urinalysis, OB Screen (ketones, glucose, protein)   2. Postpartum care and examination        Total time: 20 minutes. Greater than 50% spent in counseling and coordination of care regarding topics, above.

## 2021-06-20 NOTE — PROGRESS NOTES
Chief Complaint:  Chief Complaint   Patient presents with     Pregnancy Confirmation     #4     HPI:   Wendy Alonzo is a 23 y.o. female is here for pregnancy intake.  She is .  Patient's last menstrual period was 2018 (exact date).  Positive home pregnancy test in mid-July.    Past medical history: reviewed and updated.  Past Medical History:   Diagnosis Date      delivery, delivered 2017    Delivered at 25 weeks 2 days. Spontaneous  labor started at 24 weeks 3 days.      labor 2017    Ctx @ 24w3d.  Fetal fibronectin positive & admitted to [facility] @ 24w6d, given betamethasone, magnesium and terbutaline tocolysis. Delivered @ 25w2d. BW 1 lb 14 oz.      Past Surgical History:   Procedure Laterality Date     NO PAST SURGERIES         Current Outpatient Prescriptions:      prenatal vit-iron fum-folic ac (PRENATAL VITAMIN) 27 mg iron- 0.8 mg Tab, Take 1 tablet by mouth once daily., Disp: 100 tablet, Rfl: 3    Social:  Social History   Substance Use Topics     Smoking status: Never Smoker     Smokeless tobacco: Never Used     Alcohol use No       OBJECTIVE:  No Known Allergies  Vitals:    18 1105   BP: (!) 86/56   Pulse: (!) 56   Resp: 20     Body mass index is 30.93 kg/(m^2).    Vital signs stable as recorded above  General: Patient is alert and oriented x 3, in no apparent distress  Fetal Exam: Fundal height was not palpable, fetal heart tones are not heard.    Results:  Results for orders placed or performed in visit on 18   Culture, Urine   Result Value Ref Range    Culture No Growth    Pregnancy (Beta-hCG, Qual), Urine   Result Value Ref Range    Pregnancy Test, Urine Positive (!) Negative   Urinalysis, OB Screen   Result Value Ref Range    Glucose, UA Negative Negative    Ketones, UA Negative Negative    Protein, UA Negative Negative mg/dL   ABO/RH Typing (OP order)   Result Value Ref Range    HML ABO/Rh Typing B POS     HML ABO/Rh Repeat Typing B POS     Hepatitis B Surface antigen (HBsAG)   Result Value Ref Range    Hepatitis B Surface Ag Positive, Previous Confirm (!) Negative   HIV Antigen/Antibody Screening Cascade   Result Value Ref Range    HIV Antigen / Antibody Negative Negative   HML Antibody Screen   Result Value Ref Range    HML Antibody Screen Negative Negative   RPR   Result Value Ref Range    Treponema Antibody (Syphilis) Negative Negative   Rubella Immune Status (IgG)   Result Value Ref Range    Rubella Antibody, IgG Positive    Lead, Blood   Result Value Ref Range    Lead  <5.0 ug/dL    Collection Method Venous     Lead Retest No    Drugs of Abuse 1,Urine   Result Value Ref Range    Amphetamines Screen Negative Screen Negative    Benzodiazepines Screen Negative Screen Negative    Opiates Screen Negative Screen Negative    Phencyclidine Screen Negative Screen Negative    THC Screen Negative Screen Negative    Barbiturates Screen Negative Screen Negative    Cocaine Metabolite Screen Negative Screen Negative    Oxycodone Screen Negative Screen Negative    Creatinine, Urine 157.5 mg/dL   HM1 (CBC with Diff)   Result Value Ref Range    WBC 7.9 4.0 - 11.0 thou/uL    RBC 4.42 3.80 - 5.40 mill/uL    Hemoglobin 12.3 12.0 - 16.0 g/dL    Hematocrit 36.2 35.0 - 47.0 %    MCV 82 80 - 100 fL    MCH 27.8 27.0 - 34.0 pg    MCHC 34.0 32.0 - 36.0 g/dL    RDW 13.9 11.0 - 14.5 %    Platelets 238 140 - 440 thou/uL    MPV 9.1 8.5 - 12.5 fL    Neutrophils % 64 50 - 70 %    Lymphocytes % 25 20 - 40 %    Monocytes % 8 2 - 10 %    Eosinophils % 3 0 - 6 %    Basophils % 0 0 - 2 %    Neutrophils Absolute 5.1 2.0 - 7.7 thou/uL    Lymphocytes Absolute 2.0 0.8 - 4.4 thou/uL    Monocytes Absolute 0.7 0.0 - 0.9 thou/uL    Eosinophils Absolute 0.2 0.0 - 0.4 thou/uL    Basophils Absolute 0.0 0.0 - 0.2 thou/uL   Lead Venous With Demographics   Result Value Ref Range    Lead, B <1.0 0.0 - 4.9 mcg/dL    Venous/Capillary Venous     Patient Street Address [address] [place] [address]  [facility]     Patient Zip Code [id number]     Patient [facility]     Patient Home Phone [phone]     Patient Race      Patient Ethnicity Non      Patient Occupation NA     Patient Employer NA     Guardian First Name NA     Guardian Last Name NA     Health Care Provider Name Sukhi     Health Care Provider Street Address      Health Care Provider [facility]     Health Care Provider [facility]     Health Care Provider Zip Code NA     Health Care Provider Phone [phone]     Submitting Laboratory Phone [phone]    ALT (SGPT)   Result Value Ref Range    ALT 11 0 - 45 U/L     Other screening pregnancy lab work is ordered and pending.    Patient scored a 2/30 on [place]  Depression Screen.    Assessment and Plan:  1. Pregnancy Intake Appointment.  Wendy Alonzo is 23 y.o. and .  Patient's last menstrual period was 2018 (exact date).  Estimated Date of Delivery: 3/30/19  She will be seeing Dr. Scruggs for OB care.  Screening pregnancy lab work was drawn.  Prenatal vitamins prescribed.  Problem list and current medications reviewed and updated.  Dating ultrasound ordered.  Prenatal info packet given.  First Trimester screening was discussed with patient, she is undecided, and will let us know if she wants this done.    2.  delivery in 2017 at 25 weeks.    3. Hepatitis B.    Follow up in 2-4 weeks.  Please see OB Episode for complete details.  Visit was approximately 30 minutes, greater than 50% of time spent in face-to-face counseling and coordination of care.    This dictation uses voice recognition software, which may contain typographical errors.

## 2021-06-20 NOTE — PROGRESS NOTES
MTM Initial Encounter  Assessment & Plan                                                       1. History of  delivery at 25 weeks  Discussed risks vs benefits of weekly Cecily injections. Given patient information sheet to . They elect to proceed with treatment of [place] 250 mg IM weekly starting at 16 weeks-36 weeks  -I will submit pre-authorization request to HE PA department  -Continue prenatal MV  -No OTCs or herbals unless talking to MD first    2. Chronic hepatitis B (H)  Encouraged to meet with GI to discuss. She prefers to not pursue this today - they will continue to think about it    Follow Up  PRN      Subjective & Objective                                                     Wendy Alonzo is a 23 y.o. female coming in for an initial visit for Medication Therapy Management. She was referred to me from Blanche Scruggs MD to discuss [place] injections for prevention of  labor    Hx of spontaneous  delivery at 25 weeks. PCP is planning on starting [place] for use from 16-36 weeks. She is currently at 10w5d based on estimate from previous PCP note. Three small children at home. Wendy is doing well today. Taking a prenatal MV daily. No OTCs or supplements.     Hx of hep B. Discussed with Dr. Scruggs at last visit. They still haven't decided if they want to see GI or not. Not on meds for this.     PMH: reviewed in EPIC   Allergies/ADRs: reviewed in EPIC   Alcohol: reviewed in EPIC   Tobacco:   History   Smoking Status     Never Smoker   Smokeless Tobacco     Never Used     Today's Vitals:   Vitals:    18 1014   BP: 100/42   Pulse: 78   Weight: 149 lb (67.6 kg)     ----------------    Much or all of the text in this note was generated through the use of Dragon Dictate voice-to-text software. Errors in spelling or words which seem out of context are unintentional. Sound alike errors, in particular, may have escaped editing.    The patient was given a summary of these recommendations as an  after visit summary    I spent 30 minutes with this patient today.   All changes were made via collaborative practice agreement with Blanche Scruggs MD. A copy of the visit note was provided to the patient's provider.     Bridget Lee, PharmD, BCACP  MTM Pharmacist   [facility]         Current Outpatient Prescriptions   Medication Sig Dispense Refill     prenatal vit-iron fum-folic ac (PRENATAL VITAMIN) 27 mg iron- 0.8 mg Tab Take 1 tablet by mouth once daily. 100 tablet 3     No current facility-administered medications for this visit.

## 2021-06-20 NOTE — PROGRESS NOTES
"First OB Appointment    Assessment & Plan:    # History of  delivery at 25 weeks. Spontaneous.     Patient consents to IM progesterone (Cecily) from 16-36 weeks. Referral made to clinic pharmacist to coordinate.    Referral to OB/gyn, Dr. Dasilva, who delivered her  infant, for consultation due to history of  delivery.    Plan first cervical length US at 16 weeks.    Dating by sure LMP c/w 8w5d US - ED[facility] 3/30/19.  # Chronic hepatitis B. High viral load.     Declines antivirals and GI referral (\"will think about it\").  # Short interval between pregnancies (deliveries , , and 11/15)    At risk for  delivery    Will need focus on nutrition in pregnancy  # Obesity. Prepregnancy BMI 30.9     Recommend 10-20 lb gain.    Subjective:  This is a unplanned pregnancy. The patient feels OK about it. Current pregnancy symptoms include: fatigue.     I reviewed the following components of the patient chart today:    OB intake note    Active problems    Past Medical History    Medications    Allergies    Family History    Social History    Genetic Questionairre    Prenatal Labs    Prenatal Ultrasound    OB history  OB History    Para Term  AB Living   4 3 2 1 0 3   SAB TAB Ectopic Multiple Live Births   0 0 0 0 3      # Outcome Date GA Lbr Noah/2nd Weight Sex Delivery Anes PTL Lv   4 Current            3  17 25w2d 02:14 / 00:17 1 lb 14.3 oz (0.86 kg) M Vag-Spont None Y ROXANNE      Complications:  delivery      Birth Comments: Spontaneous  labor at 24+6 weeks. Admitted and treated with magnesium, betamethasone. See placental pathology notes.   2 Term 16 39w4d 06:26 / 00:08 6 lb 13 oz (3.09 kg) M Vag-Spont None N ROXANNE      Complications: Chronic hepatitis B (H),Short interval between pregnancies affecting pregnancy in second trimester, antepartum      Birth Comments: High hepatitis B viral load, declined treatment. Spontaneous labor without complication. "  mL   1 Term 11/16/15 41w1d 09:14 / 00:29 6 lb 12 oz (3.062 kg) F Vag-Spont None N ROXANNE      Complications: Chronic hepatitis B (H),Insufficient weight gain during pregnancy      Birth Comments: Spontaneous labor.  on birthing stool. Brisk bleeding after placenta.  mL.          Objective:  See prenatal flowsheet and physical exam portion of prenatal episode.    Total time 40 minutes, >50% spent in counseling and coordination of care regarding new pregnancy and review of patient's current health status and pregnancy.

## 2021-06-21 NOTE — PROGRESS NOTES
Patient presented to the clinic  today to reschedule her appointment for [place] injection.  Her  reports that they are having difficulties with her insurance, and they expect that it will lapse at the end of the month.  reports they have an appt with a  at the [facility] tomorrow to figure this out.  They are hesitant to start the medication due to concerns about stopping and increasing her risk for  labor. I discussed with Dr. Young. Plan is to cancel CSS visit tomorrow. Hold off on [place] until insurance issues are sorted out. Remainder of visits are still scheduled but will continue to monitor the sutuation

## 2021-06-21 NOTE — PROGRESS NOTES
Subjective:  I had previously made referral to OB/GYN for evaluation due to history of  birth at 25 weeks.  For some reason, the appointment didn't get scheduled.  The patient was not really sure of why she was going there.  Discussed how I recommended monitoring of cervical length and consultation due to her increased risk for  birth.  I would continue to manage her pregnancy with OB consulting.  She is okay with that.    OB ROS:   Headache: None.   Vision change: None.   Abnormal vaginal discharge: None.   Bleeding: None.   Fetal movement: Normal.     Objective:  Reviewed vitals. Exam - see flowsheet.   Recent Results (from the past 24 hour(s))   Urinalysis, OB Screen (ketones, glucose, protein)   Result Value Ref Range    Glucose, UA Negative Negative    Ketones, UA Negative Negative    Protein, UA Negative Negative mg/dL        Assessment/Plan:    23 y.o.  at 18w6d.    History of  delivery at 25 weeks.  No symptoms of  labor.  Referral made to Dr[facility]va, [facility], who [facility] her most recent child for consultation due to history of  delivery.  Also put in referral for anatomic survey and cervical length ultrasound to be done prior to seeing OB/GYN.  Of note, the patient had a 3.3 cm cervix days before she delivered.  [place] shot was given today.  Patient is aware of the importance of getting this done every week, no matter what.    1. High-risk pregnancy supervision, second trimester  Urinalysis, OB Screen (ketones, glucose, protein)    US OB > = 14 Weeks      Total time: 15 minutes. Greater than 50% spent in counseling and coordination of care regarding topics, above.

## 2021-06-21 NOTE — PROGRESS NOTES
This patient had a recent  delivery at 25 weeks. She needs IM progesterone shots to reduce risk of recurrence. But once they are initiated, they must be done weekly, without fail until she is 36 weeks pregnant.  She is having concerns re: insurance coverage and has not started the shot due to concerns that insurance will lapse and she won't be able to get the shots.  I put in a referral to clinic care coordination so that our team can help make sure she has coverage ASAP and can get the shots ASAP.   She should also be seeing OB/Gyn in consultation and for ultrasounds because of her high risk pregnancy status, so we need to make sure she is able to do that, too.

## 2021-06-21 NOTE — PROGRESS NOTES
Potential candidate referral to Clinic Care Coordination Service Outreach:   Attempt #1: Patient was identified as a potential candidate and referred to Clinic Care Coordination Service. I call and spoke with patient today, 10- to discuss possible clinic care coordination enrollment. Have introduced myself to patient. Clinic care coordination service was described to the patient and immediate needs were discussed. The patient has declined participating into clinic care coordination service at this time. I will discontinue outreach to the patient at this time. Refer patient to contact her pcp office for any questions. I have notified the patient s physician by task. If the provider feels this patient is a good fit in the future I have asked them to resend to the [facility] referral bucket. I have also provided the patient with my contact information should they change their mind.     Patient stated:   -Already apply for medical assistance about a month ago. Received letter in the mail stated application is in progress and keep me update. Waiting to hear from the [facility]. Will contact our financial  for further questions.   -have no further need at this time. Thank you for calling today.     Plan:   No follow up.

## 2021-06-21 NOTE — PROGRESS NOTES
High risk pregnancy due to history of recent  delivery. This is critical to get started in a maintainable fashion ASAP.  Please touch base with them to see how the visit with the [facility]  went. I'm going to put in a referral to clinic care coordination so that we can use our team to get this rolling ASAP, as well.

## 2021-06-22 NOTE — PROGRESS NOTES
Subjective:  No symptoms of  labor. Discussed that if she does get symptoms, she should go to [facility] immediately (until 28 weeks). Seeing OB today for cervical length US and anatomic survey.    OB ROS:   Headache: None.   Vision change: None.   Abnormal vaginal discharge: None.   Bleeding: None.   Fetal movement: Normal.     Objective:  Reviewed vitals. Exam - see flowsheet.   Recent Results (from the past 24 hour(s))   Urinalysis, OB Screen (ketones, glucose, protein)   Result Value Ref Range    Glucose, UA Negative Negative    Ketones, UA Negative Negative    Protein, UA Negative Negative mg/dL        Assessment/Plan:    23 y.o.  at 22w6d.    History of  labor - [place] given, seeing OB today for cervical length    Genetic screening - declines    Discussed 1hr GCT at next visit.    Discussed nutrition - high protein, high nutrient due to closely spaced pregnancies.  1. Encounter for supervision of other normal pregnancy in second trimester  Urinalysis, OB Screen (ketones, glucose, protein)      Total time: 15 minutes. Greater than 50% spent in counseling and coordination of care regarding topics, above.

## 2021-06-22 NOTE — TELEPHONE ENCOUNTER
Left message x 3. Please review the messages below and advise the patient or caller when they return the clinic's call. Please schedule as indicated below if necessary.   Letter sent.

## 2021-06-22 NOTE — PROGRESS NOTES
Subjective:    Wendy is here today with her .  She states that she had a good Suyapa.  She has not had any symptoms of  labor.  Symptoms of  labor reviewed in detail today.  Discussed calling [facility] immediately should she develop any symptoms.  She put the phone number into her 's phone today at the visit.  She has been coming in weekly for her [place] injections.  Next week, she needs to reschedule the appointment for later in the day, but will still be able to make it in.    OB ROS:   Headache: None.   Vision change: None.   Abnormal vaginal discharge: None.   Bleeding: None.   Fetal movement: Normal.     Objective:  Reviewed vitals. Exam - see flowsheet.   Recent Results (from the past 24 hour(s))   Urinalysis, OB Screen (ketones, glucose, protein only)   Result Value Ref Range    Glucose, UA Negative Negative    Ketones, UA Negative Negative    Protein, UA Trace (!) Negative mg/dL   Hemoglobin   Result Value Ref Range    Hemoglobin 11.9 (L) 12.0 - 16.0 g/dL   Glucose,Gestational Challenge (1 Hour)   Result Value Ref Range    Glucose, Gestational Challenge 1hr 97 70 - 139 mg/dL    Patient Fasting > 8hrs? No         Assessment/Plan:    23 y.o.  at 26w6d.    History of  delivery at 25 weeks with last pregnancy.  Currently at 26 weeks and 6 days.  On IM progesterone.  Has seen OB in consultation.  I do not have the records from that visit yet.  They were requested today.    Screening for gestational diabetes, anemia and syphilis performed.    Chronic hepatitis B.  Check hepatitis B DNA level.  Patient is not interested in treating her hepatitis B during pregnancy.  1. Supervision of other high risk pregnancies, second trimester  Urinalysis, OB Screen (ketones, glucose, protein only)    RPR    Hemoglobin    Glucose,Gestational Challenge (1 Hour)    CANCELED: Urinalysis, OB Screen (ketones, glucose, protein)   2. History of  delivery at 25 weeks     3. Chronic  hepatitis B (H)  Hepatitis B DNA Quantitative Real-Time PCR(HBQNT)   4. High risk pregnancy due to history of  labor in second trimester        Total time: 15 minutes. Greater than 50% spent in counseling and coordination of care regarding topics, above.

## 2021-06-22 NOTE — TELEPHONE ENCOUNTER
Zakia LEMON left message x 2. When patient returns clinic call, please review notes below and advise/schedule. Thank you.

## 2021-06-23 NOTE — TELEPHONE ENCOUNTER
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  You will need IV antibiotics when you are in labor to protect your baby from getting sick.

## 2021-06-23 NOTE — PATIENT INSTRUCTIONS - HE
Patient Education   HEALTHY PREGNANCY CARE: 30-34 WEEKS PREGNANT    You have made it to the final months of your pregnancy. By now, your baby is starting to fill out with some fat under his skin, fuzzy hair on his shoulders, and is gaining 4 to 6 ounces per week.    Discuss any travel plans with your midwife or physician.    Review possible changes in sexuality during later pregnancy and discuss these with your midwife or physician, as well as your partner. Alternative love-making positions may be more comfortable.    Discuss labor and delivery issues with your midwife or physician. If you had a  birth in the past, discuss a trial of labor with your midwife or physician. He or she may ask that you sign a consent form, if you wish to have a vaginal birth after  (). Ask your midwife or physician to explain your options for managing pain during your labor and delivery. Sometimes, during the birth process, an episiotomy may need to be cut in the vagina to make the opening bigger or let the baby come out quicker. You may want to discuss the episiotomy and how often it is needed with your midwife or physician.    Plan for your baby's care by selecting a child health care provider (Family physician, Pediatrician, or Pediatric Nurse Practitioner). Practice installing an infant car seat correctly in the car. Ask for car seat information as needed and make sure it is safe and will work in the car your baby will ride in. You will need a car seat to bring your baby home from the hospital. Check the procedure for adding your baby to your health care plan. Review your decision about circumcision and ask for any information you need. As you buy and receive items for your baby, don't put a baby walker on your list. Walkers can be dangerous and can cause serious injury to your child. A safer option is a saucer-type play station, since it doesn't allow baby to travel across the floor.    Discuss your choices and  plans for birth control with your midwife or physician. Women who are breastfeeding can still become pregnant. Use a birth control method if you want to lower your pregnancy risk. Talk to your midwife or physician if you are considering permanent birth control, such as tubal ligation or Essure. You may need to complete a consent form 30 days prior to delivery in order to have this done after you deliver.    Continue to watch for signs and symptoms of preeclampsia:     Sudden swelling of your face, hands, or feet     New vision problems such as blurring, double vision, or flashing lights    A severe headache not relieved with acetaminophen (Tylenol)    Sharp or stabbing pain in your right or middle upper abdomen    Watch for signs and symptoms of premature labor:     Regular contractions. This means having about 6 or more within 1 hour, even after you have had a glass of water and are resting.     A backache that starts and stops regularly.    An increase or change in vaginal discharge, such as heavy, mucus-like, watery, or bloody discharge.     Your water breaks or leaks.    If you have any of the above symptoms or any other concerns, call your provider or their clinic staff at [facility]/OB at Phone: [phone]. If it's after clinic hours, physician patients should call the [facility] at [phone]); midwife patients should call their answering service at [phone].    How can you care for yourself at home?   You can refer to the Starting Out Right book or find it online at [email or web address]-[email or web address] or [email or web address]/images/[email or web address]/flipbooks/[email or web address]-starting-out-right/[facility]-starting-out-right.html#p=8     You can sign up for a weekly parenting e-mail that gives support, tips and advice from health care professionals that starts with pregnancy and continues through the toddler years. To register,  go to [email or web address] at any time during your pregnancy.

## 2021-06-23 NOTE — PROGRESS NOTES
Subjective:  No problems.    OB ROS:   Headache: None.   Vision change: None.   Abnormal vaginal discharge: None.   Bleeding: None.   Fetal movement: Normal.     Objective:  Reviewed vitals. Exam - see flowsheet.   Recent Results (from the past 24 hour(s))   Urinalysis, OB Screen (ketones, glucose, protein)   Result Value Ref Range    Glucose, UA Negative Negative    Ketones, UA Negative Negative    Protein, UA Negative Negative mg/dL        Assessment/Plan:    23 y.o.  at 30w6d.    History of  delivery at 25 weeks.  Currently receiving IM progesterone.  No symptoms of  labor.  Patient knows to call labor and delivery should she have any contractions.    Obesity, adequate weight gain.    Chronic hepatitis B.  Declines antiviral medication.    Hmong language.  Here today with .    Tdap given today.  1. Supervision of other high risk pregnancies, third trimester  Urinalysis, OB Screen (ketones, glucose, protein)      Total time: 20 minutes. Greater than 50% spent in counseling and coordination of care regarding topics, above.

## 2021-06-23 NOTE — TELEPHONE ENCOUNTER
Patient dropped off CONCURRENT DISABILITY AND LEAVE STATEMENT OF INCAPACITY / ATTENDING PHYSICIAN STATEMENT FORM for Dr. Scruggs to fill out. Placed the original copies in the 's slot.    When forms are completed, patient would like it:    -Please call patient to let them know it's ready      Please re-route task back to the  to shred the copied forms and complete the task. Thanks!

## 2021-06-23 NOTE — PROGRESS NOTES
Subjective:    Getting back pain  Wants letter to stay home  Current work: , sitting position  When she is at work, she experiences increased lower back pain and pelvic pressure  Very difficult due to pain. When she gets up and walks it is difficult due to the pain  Also having foot pain      OB ROS:   Headache: None.   Vision change: None.   Abnormal vaginal discharge: None.   Bleeding: None.   Fetal movement: Normal.     Objective:  Reviewed vitals. Exam - see flowsheet.   Recent Results (from the past 24 hour(s))   Urinalysis, OB Screen (ketones, glucose, protein)   Result Value Ref Range    Glucose, UA Negative Negative    Ketones, UA Negative Negative    Protein, UA Negative Negative mg/dL      Closed internally, 50% effaced, -3 station, medium consistency, mid position    Assessment/Plan:    23 y.o.  at 32w3d.     contractions with out significant cervical change.  Patient at high risk for  labor due to previous delivery at 25 weeks.  Work note was written.  Actually encouraged the patient to take off work sooner, due to increased risks of  delivery.  Discussed symptoms of  labor and calling should she experience any of these.  1. Supervision of other high risk pregnancies, third trimester  Urinalysis, OB Screen (ketones, glucose, protein)      Total time: 15 minutes. Greater than 50% spent in counseling and coordination of care regarding topics, above.

## 2021-06-23 NOTE — PATIENT INSTRUCTIONS - HE
Patient Education   HEALTHY PREGNANCY CARE: 30-34 WEEKS PREGNANT    You have made it to the final months of your pregnancy. By now, your baby is starting to fill out with some fat under his skin, fuzzy hair on his shoulders, and is gaining 4 to 6 ounces per week.    Discuss any travel plans with your midwife or physician.    Review possible changes in sexuality during later pregnancy and discuss these with your midwife or physician, as well as your partner. Alternative love-making positions may be more comfortable.    Discuss labor and delivery issues with your midwife or physician. If you had a  birth in the past, discuss a trial of labor with your midwife or physician. He or she may ask that you sign a consent form, if you wish to have a vaginal birth after  (). Ask your midwife or physician to explain your options for managing pain during your labor and delivery. Sometimes, during the birth process, an episiotomy may need to be cut in the vagina to make the opening bigger or let the baby come out quicker. You may want to discuss the episiotomy and how often it is needed with your midwife or physician.    Plan for your baby's care by selecting a child health care provider (Family physician, Pediatrician, or Pediatric Nurse Practitioner). Practice installing an infant car seat correctly in the car. Ask for car seat information as needed and make sure it is safe and will work in the car your baby will ride in. You will need a car seat to bring your baby home from the hospital. Check the procedure for adding your baby to your health care plan. Review your decision about circumcision and ask for any information you need. As you buy and receive items for your baby, don't put a baby walker on your list. Walkers can be dangerous and can cause serious injury to your child. A safer option is a saucer-type play station, since it doesn't allow baby to travel across the floor.    Discuss your choices and  plans for birth control with your midwife or physician. Women who are breastfeeding can still become pregnant. Use a birth control method if you want to lower your pregnancy risk. Talk to your midwife or physician if you are considering permanent birth control, such as tubal ligation or Essure. You may need to complete a consent form 30 days prior to delivery in order to have this done after you deliver.    Continue to watch for signs and symptoms of preeclampsia:     Sudden swelling of your face, hands, or feet     New vision problems such as blurring, double vision, or flashing lights    A severe headache not relieved with acetaminophen (Tylenol)    Sharp or stabbing pain in your right or middle upper abdomen    Watch for signs and symptoms of premature labor:     Regular contractions. This means having about 6 or more within 1 hour, even after you have had a glass of water and are resting.     A backache that starts and stops regularly.    An increase or change in vaginal discharge, such as heavy, mucus-like, watery, or bloody discharge.     Your water breaks or leaks.    If you have any of the above symptoms or any other concerns, call your provider or their clinic staff at [facility]/OB at Phone: [phone]. If it's after clinic hours, physician patients should call the [facility] at [phone]); midwife patients should call their answering service at [phone].    How can you care for yourself at home?   You can refer to the Starting Out Right book or find it online at [email or web address]/[email or web address]/[email or web address]-[email or web address]-[email or web address] or http://www.[email or web address]org/images/stories/flipbooks/[facility]-starting-out-right/[facility]-starting-out-right.html#p=8     You can sign up for a weekly parenting e-mail that gives support, tips and advice from health care professionals that starts with pregnancy and continues through the toddler years. To register,  go to [email or web address] at any time during your pregnancy.

## 2021-06-24 NOTE — TELEPHONE ENCOUNTER
Called and spoke with Louie, inform her we do not have a release of information therefore we are not able to give any medical records. They will be sending AXEL to fax machine [phone].

## 2021-06-24 NOTE — PATIENT INSTRUCTIONS - HE
Patient Education   HEALTHY PREGNANCY CARE: 34-36 WEEKS PREGNANT    Your baby is gaining about an ounce each day, so healthy nutrition and rest are still very important. Learn about late pregnancy symptoms, such as constipation and backaches. Drinking more fluids and eating more fiber can relieve constipation. The pelvic tilt and a heating pad can ease backaches.    Continue to watch for signs and symptoms of preeclampsia:     Sudden swelling of your face, hands, or feet     New vision problems such as blurring, double vision, or flashing lights    A severe headache not relieved with acetaminophen (Tylenol)    Sharp or stabbing pain in your right or middle upper abdomen    You're almost done with your pregnancy but it's still too soon to have your baby. The goal is to have your baby after 37 weeks, so watch for signs and symptoms of premature labor:     Regular contractions. This means having about 6 or more within 1 hour, even after you have had a glass of water and are resting.     A backache that starts and stops regularly.    An increase or change in vaginal discharge, such as heavy, mucus-like, watery, or bloody discharge.     Your water breaks or leaks.    You will be tested for group B streptococcus (GBS), a type of bacteria found in 10-30% of pregnant women. A woman with GBS can pass it to her baby during delivery. Most babies who get GBS from their mothers do not have any problems, but some babies get very ill and need to be hospitalized for antibiotic treatment. Treating you with antibiotics during labor and delivery helps to prevent infection in your baby.    Review information on postpartum care that you will need after the baby is born.  Discuss your choices and plans for birth control with your midwife or physician.     Postpartum Care  During the days and weeks after the delivery of your baby (postpartum period), your body will change as it returns to its nonpregnant condition. As with pregnancy  "changes, postpartum changes are different for every woman.    Physical changes after childbirth  The changes in your body may include:    Contractions called afterpains shrink the uterus for several days after childbirth. Shrinking of the uterus to its prepregnancy size may take 6 to 8 weeks.    Sore muscles (especially in the arms, neck, or jaw) are common after childbirth. This is because of the hard work of labor and pushing your baby out. The soreness should go away in a few days.    Bleeding and vaginal discharge (lochia) may last for 2 to 8 weeks, and can come and go for about 2 months.    Vaginal soreness, including pain, discomfort, and numbness, is common after vaginal birth. Soreness may be worse if you had a perineal tear or episiotomy.    If you had a  birth (), you may have pain in your lower abdomen and may need pain medicine for 1 to 2 weeks.    Breast engorgement is common around the 3rd or 4th day after delivery, when the breasts begin to fill with milk. This can cause discomfort and swelling. If you are breast feeding, the best treatment is to feed your baby often or pump the milk out. You can also use warm compresses. If you are not breast feeding, placing ice packs on your breasts, taking a hot shower, or using warm compresses may relieve the discomfort.    Be aware of postpartum depression    \"Baby blues\" are common for the first 1 to 2 weeks after birth. You may cry or feel sad or irritable for no reason.     For some women, these feelings last longer and are more intense. This is called postpartum depression.     If your symptoms last for more than a few weeks or you feel very depressed, ask your midwife or physician for help.     Postpartum depression can be treated. Support groups and counseling can help, but sometimes medication is needed.     Discuss follow-up appointments with your midwife or physician, as well. He or she will usually want to see you 6 weeks after the " birth of your baby, sooner if you are having problems.    If you have questions about any symptoms you are experiencing or any other concerns, call your provider or their clinic staff at [facility]/OB at Phone: [phone]. If it's after clinic hours, physician patients should call the [facility] Connection at [phone]); midwife patients should call their answering service at [phone].    How can you care for yourself at home?   You can refer to the Starting Out Right book or find it online at http://[email or web address]/images/stories/http://[email or web address]/images/stories/maternity/[email or web address]-[email or web address]-Right.pdf or http://www.[facility].org/images/stories/flipbooks/[facility]-starting-out-right/[facility]-starting-out-right.html#p=8     You can sign up for a weekly parenting e-mail that gives support, tips and advice from health care professionals that starts with pregnancy and continues through the toddler years. To register, go to [email or web address] at any time during your pregnancy.        Making Plans for Feeding My Baby    By this point, you probably have read a lot about feeding your baby.  Breastfeed or formula? Each mother s decision is her own and [facility] respects you and your choices. We ve gathered information on both breastfeeding and formula feeding to help with your decision. Talking with your physician or nurse-midwife can also help in your decision.  However you plan to feed your baby, [facility] encourage rooming in with your baby, skin-to-skin contact and feeding your baby based on his or her cues.    Skin-to-skin contact  Being close to mom helps your baby adjust to life outside of the womb.  It helps your baby regulate their body temperature, heart rate, and breathing.  Your baby will usually be placed skin-to-skin immediately following birth or as soon as possible, if medical intervention is needed.    Rooming-In  Having your  baby stay with you in your room is called  rooming-in .  Keeping your baby in your room helps you to learn how to care for your baby by getting to know your baby s cues, body rhythms and sleep cycle.       Cue-based feeding  Cues (signals) are baby s way of telling you what he or she wants.  When you learn your infant s cues, you know how to care for and feed your baby.   Feeding cues are the licking and smacking of lips, bringing their fist to their mouth, and a reflex called  rooting - where baby turns and opens his or her mouth, searching for the breast or bottle.  Crying is a late feeding cue.  Babies can feed frequently, often at least 8 times in 24 hours.  Breastfeeding facts  Breast milk is the best source of nutrition for your baby and is available at birth. In the first couple of days, your milk volume is already starting to increase, though it may not be noticeable. Breastfeed frequently to increase your milk supply. Within three to five days, you will begin to notice larger milk volumes. An increase in breast size, heaviness and firmness are often described as the milk  coming in.  Frequent breastfeeding can help breasts from getting overly firm and painful. You will know the baby is getting enough milk if your baby is having wet and dirty diapers and gaining weight.     If your goal is to exclusively breastfeed, it is important to not use any formula or artificial nipples (including bottles and pacifiers) while your baby is learning to breastfeed.  While it may seem like an  easy  option to give your baby a bottle, formula should only be given if there is a medical reason for your baby to have it.    Positioning and attachment   Get comfortable.  Use pillows as needed to support your arms and baby.  Hold baby close at the level of your breast, facing you in a tummy to tummy position.  Skin to skin helps with this.  Position the baby with his or her nose by the nipple.  There should be a straight line  from baby s ear to shoulder to hips.  Tickle your baby s lips or wait for baby to open mouth wide, bring baby to breast by leading with the chin.  Aim the nipple at the roof of baby s mouth.  A rapid sucking pattern is followed by longer, drawing pattern with occasional swallows heard.  When baby is correctly latched, your nipple and much of the areola are pulled well into baby s mouth.      Returning to work or school  Focus on a good start to breastfeeding.  Many women continue to provide breastmilk for their baby when they return to work or school.  Making plans about where to pump and store milk can make the transition go well.  Talk with other mothers who have also returned to work or school for tips and support.  Your employer s Human Resource department may be a resource as well.     Returning to work or school: (continued)     babies can mean fewer  sick  days for you.    A quality breast pump will also save time and add comfort.  Check with your insurance prior to giving birth for breast pump coverage.  Many insurance companies include a pump within your benefits.    Wait until your baby is at least three weeks old to introduce a bottle for the first time.  Have someone besides you give the bottle.    Breastfeed when you are with your baby. Reserve your bottles of breast milk for when you are away.     Your breasts will need to be  emptied  either by your baby or a pump.  Plan to pump at least twice in an eight hour day.    If you cannot pump at work, continue breastfeeding at home. Any amount of breast milk is worth giving to your baby.    Formula feeding facts  If you are planning to use formula to feed your baby, you will want to make some preparations ahead of time. Talk to your doctor or nurse-midwife about what type of formula to use. Some are iron-fortified, meaning they have extra iron in them. You will want to purchase formula and bottles before your baby is born to be sure you are ready  after you return from the hospital. The [facility] do not provide formula samples to take home.    Be sure to follow formula mixing directions closely. Regular milk in the dairy case at the grocery store should not be given to babies under 1 year old. Baby formula is sold in several forms including:    Ready-to-use. This is the most expensive, but no mixing is necessary.    Concentrated liquid. This is less expensive than ready-to-use and you mix with water.    Powder. This is the least expensive. You mix one level scoop of powdered formula with two ounces of water and stir well.    Most babies need 2.5 ounces of formula per pound of body weight each day. This means an 8-pound baby may drink about 20 ounces of formula a day; however, this is just an estimate. The most important thing is to pay attention to your baby s cues.  If your baby is always fussy, needs more iron or has certain food allergies, your physician may suggest you change your baby s formula to a different kind.   How do I warm my baby s bottles?  You may feed your baby a bottle without warming it first. It is OK for the breast milk or formula to be cool or room temperature. If your baby seems to prefer it warmed, you can put the filled bottle in a container of warm water and let it stand for a few minutes. Check the temperature of the liquid on your skin before feeding it to your baby; to be sure it isn t too hot. Do not heat bottles in the microwave. Microwaves heat food and liquids unevenly, and this can cause hot spots that can burn your baby.    How do I clean and sterilize bottles?  Sterilize bottles and nipples before you use them for the first time. You can do this by putting them in boiling water for 5 minutes. After that first time, you can wash them in hot and soapy water. Rinse them carefully to be sure there is no soap left on them. You can also wash them in the .    [facility] [phone] ([phone])

## 2021-06-24 NOTE — TELEPHONE ENCOUNTER
Left message to call back #1. I have the pt's letter ready. I am uncertain what the pt wants us to do with it. Does she want us to fax the letter or does the pt wants to come to the clinic to  the letter?

## 2021-06-24 NOTE — TELEPHONE ENCOUNTER
I called them.  The voicemail stated that it was a secure voicemail and okay to leave confidential patient information.  I did review the release of information that was on my desk prior to calling.  I left a message on the voicemail stating that the patient needs to be off work due to  contractions with history of  delivery at 25 weeks and 2017.  Patient at very high risk for  delivery.  I think it is medically essential for her to be off work.  Told her that if she needs medical records, she should call back and ask for the medical records that they need.  The release of information is now in my out box.

## 2021-06-24 NOTE — PATIENT INSTRUCTIONS - HE
Patient Education   HEALTHY PREGNANCY CARE: 34-36 WEEKS PREGNANT    Your baby is gaining about an ounce each day, so healthy nutrition and rest are still very important. Learn about late pregnancy symptoms, such as constipation and backaches. Drinking more fluids and eating more fiber can relieve constipation. The pelvic tilt and a heating pad can ease backaches.    Continue to watch for signs and symptoms of preeclampsia:     Sudden swelling of your face, hands, or feet     New vision problems such as blurring, double vision, or flashing lights    A severe headache not relieved with acetaminophen (Tylenol)    Sharp or stabbing pain in your right or middle upper abdomen    You're almost done with your pregnancy but it's still too soon to have your baby. The goal is to have your baby after 37 weeks, so watch for signs and symptoms of premature labor:     Regular contractions. This means having about 6 or more within 1 hour, even after you have had a glass of water and are resting.     A backache that starts and stops regularly.    An increase or change in vaginal discharge, such as heavy, mucus-like, watery, or bloody discharge.     Your water breaks or leaks.    You will be tested for group B streptococcus (GBS), a type of bacteria found in 10-30% of pregnant women. A woman with GBS can pass it to her baby during delivery. Most babies who get GBS from their mothers do not have any problems, but some babies get very ill and need to be hospitalized for antibiotic treatment. Treating you with antibiotics during labor and delivery helps to prevent infection in your baby.    Review information on postpartum care that you will need after the baby is born.  Discuss your choices and plans for birth control with your midwife or physician.     Postpartum Care  During the days and weeks after the delivery of your baby (postpartum period), your body will change as it returns to its nonpregnant condition. As with pregnancy  "changes, postpartum changes are different for every woman.    Physical changes after childbirth  The changes in your body may include:    Contractions called afterpains shrink the uterus for several days after childbirth. Shrinking of the uterus to its prepregnancy size may take 6 to 8 weeks.    Sore muscles (especially in the arms, neck, or jaw) are common after childbirth. This is because of the hard work of labor and pushing your baby out. The soreness should go away in a few days.    Bleeding and vaginal discharge (lochia) may last for 2 to 8 weeks, and can come and go for about 2 months.    Vaginal soreness, including pain, discomfort, and numbness, is common after vaginal birth. Soreness may be worse if you had a perineal tear or episiotomy.    If you had a  birth (), you may have pain in your lower abdomen and may need pain medicine for 1 to 2 weeks.    Breast engorgement is common around the 3rd or 4th day after delivery, when the breasts begin to fill with milk. This can cause discomfort and swelling. If you are breast feeding, the best treatment is to feed your baby often or pump the milk out. You can also use warm compresses. If you are not breast feeding, placing ice packs on your breasts, taking a hot shower, or using warm compresses may relieve the discomfort.    Be aware of postpartum depression    \"Baby blues\" are common for the first 1 to 2 weeks after birth. You may cry or feel sad or irritable for no reason.     For some women, these feelings last longer and are more intense. This is called postpartum depression.     If your symptoms last for more than a few weeks or you feel very depressed, ask your midwife or physician for help.     Postpartum depression can be treated. Support groups and counseling can help, but sometimes medication is needed.     Discuss follow-up appointments with your midwife or physician, as well. He or she will usually want to see you 6 weeks after the " birth of your baby, sooner if you are having problems.    If you have questions about any symptoms you are experiencing or any other concerns, call your provider or their clinic staff at [facility]/OB at Phone: [phone]. If it's after clinic hours, physician patients should call the Care Connection at [phone]); midwife patients should call their answering service at [phone].    How can you care for yourself at home?   You can refer to the Starting Out Right book or find it online at http://[email or web address]/images/stories/http://[email or web address]/images/stories/maternity/[email or web address]-[email or web address]-Right.pdf or http://www.[facility].org/images/stories/flipbooks/[facility]-starting-out-right/[facility]-starting-out-right.html#p=8     You can sign up for a weekly parenting e-mail that gives support, tips and advice from health care professionals that starts with pregnancy and continues through the toddler years. To register, go to [email or web address] at any time during your pregnancy.        Making Plans for Feeding My Baby    By this point, you probably have read a lot about feeding your baby.  Breastfeed or formula? Each mother s decision is her own and [facility] respects you and your choices. We ve gathered information on both breastfeeding and formula feeding to help with your decision. Talking with your physician or nurse-midwife can also help in your decision.  However you plan to feed your baby, [facility] encourage rooming in with your baby, skin-to-skin contact and feeding your baby based on his or her cues.    Skin-to-skin contact  Being close to mom helps your baby adjust to life outside of the womb.  It helps your baby regulate their body temperature, heart rate, and breathing.  Your baby will usually be placed skin-to-skin immediately following birth or as soon as possible, if medical intervention is needed.    Rooming-In  Having your  baby stay with you in your room is called  rooming-in .  Keeping your baby in your room helps you to learn how to care for your baby by getting to know your baby s cues, body rhythms and sleep cycle.       Cue-based feeding  Cues (signals) are baby s way of telling you what he or she wants.  When you learn your infant s cues, you know how to care for and feed your baby.   Feeding cues are the licking and smacking of lips, bringing their fist to their mouth, and a reflex called  rooting - where baby turns and opens his or her mouth, searching for the breast or bottle.  Crying is a late feeding cue.  Babies can feed frequently, often at least 8 times in 24 hours.  Breastfeeding facts  Breast milk is the best source of nutrition for your baby and is available at birth. In the first couple of days, your milk volume is already starting to increase, though it may not be noticeable. Breastfeed frequently to increase your milk supply. Within three to five days, you will begin to notice larger milk volumes. An increase in breast size, heaviness and firmness are often described as the milk  coming in.  Frequent breastfeeding can help breasts from getting overly firm and painful. You will know the baby is getting enough milk if your baby is having wet and dirty diapers and gaining weight.     If your goal is to exclusively breastfeed, it is important to not use any formula or artificial nipples (including bottles and pacifiers) while your baby is learning to breastfeed.  While it may seem like an  easy  option to give your baby a bottle, formula should only be given if there is a medical reason for your baby to have it.    Positioning and attachment   Get comfortable.  Use pillows as needed to support your arms and baby.  Hold baby close at the level of your breast, facing you in a tummy to tummy position.  Skin to skin helps with this.  Position the baby with his or her nose by the nipple.  There should be a straight line  from baby s ear to shoulder to hips.  Tickle your baby s lips or wait for baby to open mouth wide, bring baby to breast by leading with the chin.  Aim the nipple at the roof of baby s mouth.  A rapid sucking pattern is followed by longer, drawing pattern with occasional swallows heard.  When baby is correctly latched, your nipple and much of the areola are pulled well into baby s mouth.      Returning to work or school  Focus on a good start to breastfeeding.  Many women continue to provide breastmilk for their baby when they return to work or school.  Making plans about where to pump and store milk can make the transition go well.  Talk with other mothers who have also returned to work or school for tips and support.  Your employer s Human Resource department may be a resource as well.     Returning to work or school: (continued)     babies can mean fewer  sick  days for you.    A quality breast pump will also save time and add comfort.  Check with your insurance prior to giving birth for breast pump coverage.  Many insurance companies include a pump within your benefits.    Wait until your baby is at least three weeks old to introduce a bottle for the first time.  Have someone besides you give the bottle.    Breastfeed when you are with your baby. Reserve your bottles of breast milk for when you are away.     Your breasts will need to be  emptied  either by your baby or a pump.  Plan to pump at least twice in an eight hour day.    If you cannot pump at work, continue breastfeeding at home. Any amount of breast milk is worth giving to your baby.    Formula feeding facts  If you are planning to use formula to feed your baby, you will want to make some preparations ahead of time. Talk to your doctor or nurse-midwife about what type of formula to use. Some are iron-fortified, meaning they have extra iron in them. You will want to purchase formula and bottles before your baby is born to be sure you are ready  after you return from the hospital. The [facility] do not provide formula samples to take home.    Be sure to follow formula mixing directions closely. Regular milk in the dairy case at the grocery store should not be given to babies under 1 year old. Baby formula is sold in several forms including:    Ready-to-use. This is the most expensive, but no mixing is necessary.    Concentrated liquid. This is less expensive than ready-to-use and you mix with water.    Powder. This is the least expensive. You mix one level scoop of powdered formula with two ounces of water and stir well.    Most babies need 2.5 ounces of formula per pound of body weight each day. This means an 8-pound baby may drink about 20 ounces of formula a day; however, this is just an estimate. The most important thing is to pay attention to your baby s cues.  If your baby is always fussy, needs more iron or has certain food allergies, your physician may suggest you change your baby s formula to a different kind.   How do I warm my baby s bottles?  You may feed your baby a bottle without warming it first. It is OK for the breast milk or formula to be cool or room temperature. If your baby seems to prefer it warmed, you can put the filled bottle in a container of warm water and let it stand for a few minutes. Check the temperature of the liquid on your skin before feeding it to your baby; to be sure it isn t too hot. Do not heat bottles in the microwave. Microwaves heat food and liquids unevenly, and this can cause hot spots that can burn your baby.    How do I clean and sterilize bottles?  Sterilize bottles and nipples before you use them for the first time. You can do this by putting them in boiling water for 5 minutes. After that first time, you can wash them in hot and soapy water. Rinse them carefully to be sure there is no soap left on them. You can also wash them in the .    [facility] Connection [phone] ([phone])

## 2021-06-24 NOTE — TELEPHONE ENCOUNTER
Called and spoke with Louie. Inform her we have not received a release yet. Per Louie they just got the AXEL signed today. She will fax it to us.

## 2021-06-24 NOTE — TELEPHONE ENCOUNTER
----- Message from Chucky Alnozo,  sent at 2/13/2019  3:42 PM CST -----  Regarding: need to change the date of short term disabilty  Contact: [phone]  Patient called to change the date of short term beginning Thursday February 14th instead of Monday February 25th.

## 2021-06-24 NOTE — TELEPHONE ENCOUNTER
Who is calling:  Nurse sulma Cerdagwick  Reason for Call:  I am requesting the provider or nurse return my call. Looking for more information regarding this patient and what is preventing her from working.  My fax [phone] if you want to fax over some office notes.   Date of last appointment with primary care: 2/12/19  Has the patient been recently seen:  Yes  Okay to leave a detailed message: Yes

## 2021-06-24 NOTE — PROGRESS NOTES
Subjective:  Last week had some contractions. Now that she has stopped working, she seems to be getting them less. Had one night this week where they were really increasing. Discussed calling hospital if increasing contractions.    OB ROS:   Headache: None.   Vision change: None.   Abnormal vaginal discharge: None.   Bleeding: None.   Fetal movement: Normal.     Objective:  Reviewed vitals. Exam - see flowsheet.     No results found for this or any previous visit (from the past 24 hour(s)).     Assessment/Plan:    23 y.o.  at 34w6d.    History of  delivery at 25 weeks. Currently off work due to  labor symptoms. Continue Cecily. Discussed calling if contractions increase. Cervix stable.    Discussed GBS+ and need for intrapartum antibiotics.    Chronic hepatitis B with high viral load - declines antivirals.  1. Supervision of other high risk pregnancies, third trimester  Urinalysis, OB Screen (ketones, glucose, protein)   2. Chronic hepatitis B (H)     3. History of  delivery at 25 weeks     4. Group B Streptococcus carrier, +RV culture, currently pregnant     5. Language barrier     6. Obesity affecting pregnancy in third trimester        Total time: 15 minutes. Greater than 50% spent in counseling and coordination of care regarding topics, above.

## 2021-06-24 NOTE — TELEPHONE ENCOUNTER
Who is calling:  Nurse sulma Cerdagwick  Reason for Call:   Nurse was checking on the message from yesterday  Date of last appointment with primary care:   2/12/2019  Has the patient been recently seen:  Yes  Okay to leave a detailed message: Yes

## 2021-06-24 NOTE — PATIENT INSTRUCTIONS - HE
Patient Education   HEALTHY PREGNANCY CARE: 30-34 WEEKS PREGNANT    You have made it to the final months of your pregnancy. By now, your baby is starting to fill out with some fat under his skin, fuzzy hair on his shoulders, and is gaining 4 to 6 ounces per week.    Discuss any travel plans with your midwife or physician.    Review possible changes in sexuality during later pregnancy and discuss these with your midwife or physician, as well as your partner. Alternative love-making positions may be more comfortable.    Discuss labor and delivery issues with your midwife or physician. If you had a  birth in the past, discuss a trial of labor with your midwife or physician. He or she may ask that you sign a consent form, if you wish to have a vaginal birth after  (). Ask your midwife or physician to explain your options for managing pain during your labor and delivery. Sometimes, during the birth process, an episiotomy may need to be cut in the vagina to make the opening bigger or let the baby come out quicker. You may want to discuss the episiotomy and how often it is needed with your midwife or physician.    Plan for your baby's care by selecting a child health care provider (Family physician, Pediatrician, or Pediatric Nurse Practitioner). Practice installing an infant car seat correctly in the car. Ask for car seat information as needed and make sure it is safe and will work in the car your baby will ride in. You will need a car seat to bring your baby home from the hospital. Check the procedure for adding your baby to your health care plan. Review your decision about circumcision and ask for any information you need. As you buy and receive items for your baby, don't put a baby walker on your list. Walkers can be dangerous and can cause serious injury to your child. A safer option is a saucer-type play station, since it doesn't allow baby to travel across the floor.    Discuss your choices and  plans for birth control with your midwife or physician. Women who are breastfeeding can still become pregnant. Use a birth control method if you want to lower your pregnancy risk. Talk to your midwife or physician if you are considering permanent birth control, such as tubal ligation or Essure. You may need to complete a consent form 30 days prior to delivery in order to have this done after you deliver.    Continue to watch for signs and symptoms of preeclampsia:     Sudden swelling of your face, hands, or feet     New vision problems such as blurring, double vision, or flashing lights    A severe headache not relieved with acetaminophen (Tylenol)    Sharp or stabbing pain in your right or middle upper abdomen    Watch for signs and symptoms of premature labor:     Regular contractions. This means having about 6 or more within 1 hour, even after you have had a glass of water and are resting.     A backache that starts and stops regularly.    An increase or change in vaginal discharge, such as heavy, mucus-like, watery, or bloody discharge.     Your water breaks or leaks.    If you have any of the above symptoms or any other concerns, call your provider or their clinic staff at [facility]/OB at Phone: [phone]. If it's after clinic hours, physician patients should call the Care Connection at [phone]); midwife patients should call their answering service at [phone].    How can you care for yourself at home?   You can refer to the Starting Out Right book or find it online at http://[facility].org/images/stories/maternity/[facility]-Starting-Out-Right.pdf or http://www.[facility].org/images/stories/flipbooks/[facility]-starting-out-right/[facility]-starting-out-right.html#p=8     You can sign up for a weekly parenting e-mail that gives support, tips and advice from health care professionals that starts with pregnancy and continues through the toddler years. To register,  go to [email or web address] at any time during your pregnancy.

## 2021-06-24 NOTE — PROGRESS NOTES
Subjective:  No problems.  A little contractions    OB ROS:   Headache: None.   Vision change: None.   Abnormal vaginal discharge: None.   Bleeding: None.   Fetal movement: Normal.     Objective:  Reviewed vitals. Exam - see flowsheet.   No results found for this or any previous visit (from the past 24 hour(s)).     Assessment/Plan:    24 y.o.  at 36w6d.    Group B strep carrier.  GBS positive on 19.  GBS was not be obtained, as planned to treat regardless at delivery.  Discussed with patient: Intrapartum antibiotics in 48-hour observation.    History of  delivery at 25 weeks.  Currently 36 weeks and 6 weeks.  Discontinue Swann injections now.    Chronic hepatitis B.  High viral load.  Plan HBIG and hepatitis B for baby.    Short interval between pregnancies.  Encouraged spacing pregnancies at least 18 months.  Patient states that this might be her last pregnancy.  But she declined to discuss contraception until after her baby is born.    Maternal obesity.  Weight gain is within range.    [employer]  was here today for visit.    Order Summary                                                      1. Supervision of other high risk pregnancies, third trimester  Urinalysis, OB Screen (ketones, glucose, protein)   2. Chronic hepatitis B (H)     3. History of  delivery at 25 weeks     4. Group B Streptococcus carrier, +RV culture, currently pregnant     5. Language barrier     6. Obesity affecting pregnancy in third trimester     7. Short interval between pregnancies affecting pregnancy, antepartum        Future Appointments   Date Time Provider Department Center   3/15/2019  8:40 AM Blanche Scruggs MD [facility]   3/22/2019  8:40 AM Blanche Scruggs MD [facility]   3/29/2019  8:40 AM Blanche Scruggs MD [facility]   2019  8:40 AM Blanche Scruggs MD [facility]   2019 10:00 AM Blanche Scruggs MD [facility]   5/10/2019 10:00  Blanche Guadarrama MD [facility]       Completed by: Blanche Scruggs M.D., [facility]. 3/8/2019 9:25 AM.  This transcription uses voice recognition software, which may contain typographical errors.  Total time: 5 minutes. Greater than 50% spent in counseling and coordination of care regarding topics, above.

## 2021-06-24 NOTE — PROGRESS NOTES
Subjective:    Wendy is here today with her .  She was having some uterine cramping on 19.  She had some associated rectal pressure.  Since then, symptoms have abated.  She is interested in beginning short-term disability in early March due to her concerns about delivering the baby early.  I discussed with her that I am most concerned now because earlier delivery is more dangerous than later.  Especially with her cramping.  But she does not feel like she is ready to quit work yet.  She would begin leave on 18.    OB ROS:   Headache: None.   Vision change: None.   Abnormal vaginal discharge: None.   Bleeding: None.   Fetal movement: Normal.     Objective:  Reviewed vitals. Exam - see flowsheet.   Cervix is 1 cm open 30% effaced, -3 station.  It's posterior and medium consistency.  Recent Results (from the past 24 hour(s))   Urinalysis, OB Screen (ketones, glucose, protein)   Result Value Ref Range    Glucose, UA Negative Negative    Ketones, UA Negative Negative    Protein, UA Negative Negative mg/dL        Assessment/Plan:    23 y.o.  at 33w3d.     contractions without cervix change in a patient with history of very early  delivery at 25 weeks and 2017.  Currently on intramuscular progesterone shots.  Continue Cecily.  Note was written to begin short-term disability on 18.  Discussed calling [facility] labor and delivery if she has recurrence of cramping and pressure.  1. Supervision of other high risk pregnancies, third trimester  Urinalysis, OB Screen (ketones, glucose, protein)      Total time: 15 minutes. Greater than 50% spent in counseling and coordination of care regarding topics, above.

## 2021-06-24 NOTE — TELEPHONE ENCOUNTER
----- Message from Chucky Alonzo,  sent at 2/13/2019  3:42 PM CST -----  Regarding: need to change the date of short term disabilty  Contact: [phone]  Patient called to change the date of short term beginning Thursday February 14th instead of Monday February 25th.

## 2021-06-25 NOTE — PATIENT INSTRUCTIONS - HE
Patient Education   HEALTHY PREGNANCY CARE: 37 to 41 WEEKS PREGNANT    Talk with your midwife or physician about when to call with signs of labor    Regular uterine contractions that are getting closer together and/or stronger    If you think your water has broken or is leaking    Bleeding from the vagina like a period (bloody vaginal discharge is normal)    If you are not feeling your baby move    Make plans for transportation and  as needed for when you are going to the hospital.    Your midwife or physician may offer to check your cervix for changes.     Ask your health care provider about vaccinations you may need following delivery. By now, you should have received a Tdap immunization to protect against pertussis or whooping cough. Fathers and family members who will be in close contact with the baby should also receive a Tdap shot at least two weeks before the expected birth of the baby if they have not had a Td (tetanus) shot for at least two years.    If you are past your due date, discuss the next steps leading to delivery with your midwife or physician. If you don't start labor on your own by 41 or 42 weeks, your midwife or physician may recommend giving you medicines to ripen your cervix and start labor.    Preparing for your baby: Tell your midwife or physician how you plan to feed your baby (breast or bottle), who you have chosen to do pediatric care for your baby, and if you have a boy, whether you have chosen to have him circumcised. You will need a car seat correctly installed in your vehicle to bring your baby home. As you start to set up the nursery at home for your baby, make sure the crib is safe. The mattress needs to fit snugly against the edges of the crib. If you can fit a soda can between the bars, they are too far apart and can allow the baby's head to caught between them.    Learn about infant care and feeding, including information about infant CPR. We recommend that you put  your baby to sleep on his or her back to reduce the chance of Sudden Infant Death Syndrome (SIDS). To maintain a healthy environment in which your child can grow, it's best to keep your home smoke-free. By preparing ahead, your transition into parenthood will go smoothly for you and your baby.    Your midwife or physician will want to see you for a checkup 2 to 6 weeks after delivery.    If you have questions about any symptoms you are experiencing or any other concerns, call your provider or their clinic staff at [facility] at Phone: [phone]. If it's after clinic hours, physician patients should call the [facility] at [phone]); midwife patients should call their answering service at [phone].    How can you care for yourself at home?   You can refer to the Starting Out Right book or find it online at [email or web address][facility]Right.pdf or [email or web address]-[email or web address]-out-[email or web address]#[email or web address]     You can sign up for a weekly parenting e-mail that gives support, tips and advice from health care professionals that starts with pregnancy and continues through the toddler years. To register, go to [email or web address] at any time during your pregnancy.        Making Plans for Feeding My Baby    By this point, you probably have read a lot about feeding your baby.  Breastfeed or formula? Each mother s decision is her own and [facility] respects you and your choices. We ve gathered information on both breastfeeding and formula feeding to help with your decision. Talking with your physician or nurse-midwife can also help in your decision.  However you plan to feed your baby, [facility] encourage rooming in with your baby, skin-to-skin contact and feeding your baby based on his or her cues.    Skin-to-skin contact  Being close to mom  helps your baby adjust to life outside of the womb.  It helps your baby regulate their body temperature, heart rate, and breathing.  Your baby will usually be placed skin-to-skin immediately following birth or as soon as possible, if medical intervention is needed.    Rooming-In  Having your baby stay with you in your room is called  rooming-in .  Keeping your baby in your room helps you to learn how to care for your baby by getting to know your baby s cues, body rhythms and sleep cycle.       Cue-based feeding  Cues (signals) are baby s way of telling you what he or she wants.  When you learn your infant s cues, you know how to care for and feed your baby.   Feeding cues are the licking and smacking of lips, bringing their fist to their mouth, and a reflex called  rooting - where baby turns and opens his or her mouth, searching for the breast or bottle.  Crying is a late feeding cue.  Babies can feed frequently, often at least 8 times in 24 hours.  Breastfeeding facts  Breast milk is the best source of nutrition for your baby and is available at birth. In the first couple of days, your milk volume is already starting to increase, though it may not be noticeable. Breastfeed frequently to increase your milk supply. Within three to five days, you will begin to notice larger milk volumes. An increase in breast size, heaviness and firmness are often described as the milk  coming in.  Frequent breastfeeding can help breasts from getting overly firm and painful. You will know the baby is getting enough milk if your baby is having wet and dirty diapers and gaining weight.     If your goal is to exclusively breastfeed, it is important to not use any formula or artificial nipples (including bottles and pacifiers) while your baby is learning to breastfeed.  While it may seem like an  easy  option to give your baby a bottle, formula should only be given if there is a medical reason for your baby to have it.    Positioning and  attachment   Get comfortable.  Use pillows as needed to support your arms and baby.  Hold baby close at the level of your breast, facing you in a tummy to tummy position.  Skin to skin helps with this.  Position the baby with his or her nose by the nipple.  There should be a straight line from baby s ear to shoulder to hips.  Tickle your baby s lips or wait for baby to open mouth wide, bring baby to breast by leading with the chin.  Aim the nipple at the roof of baby s mouth.  A rapid sucking pattern is followed by longer, drawing pattern with occasional swallows heard.  When baby is correctly latched, your nipple and much of the areola are pulled well into baby s mouth.      Returning to work or school  Focus on a good start to breastfeeding.  Many women continue to provide breastmilk for their baby when they return to work or school.  Making plans about where to pump and store milk can make the transition go well.  Talk with other mothers who have also returned to work or school for tips and support.  Your employer s Human Resource department may be a resource as well.     Returning to work or school: (continued)     babies can mean fewer  sick  days for you.    A quality breast pump will also save time and add comfort.  Check with your insurance prior to giving birth for breast pump coverage.  Many insurance companies include a pump within your benefits.    Wait until your baby is at least three weeks old to introduce a bottle for the first time.  Have someone besides you give the bottle.    Breastfeed when you are with your baby. Reserve your bottles of breast milk for when you are away.     Your breasts will need to be  emptied  either by your baby or a pump.  Plan to pump at least twice in an eight hour day.    If you cannot pump at work, continue breastfeeding at home. Any amount of breast milk is worth giving to your baby.    Formula feeding facts  If you are planning to use formula to feed your  baby, you will want to make some preparations ahead of time. Talk to your doctor or nurse-midwife about what type of formula to use. Some are iron-fortified, meaning they have extra iron in them. You will want to purchase formula and bottles before your baby is born to be sure you are ready after you return from the hospital. [facility] do not provide formula samples to take home.    Be sure to follow formula mixing directions closely. Regular milk in the dairy case at the grocery store should not be given to babies under 1 year old. Baby formula is sold in several forms including:    Ready-to-use. This is the most expensive, but no mixing is necessary.    Concentrated liquid. This is less expensive than ready-to-use and you mix with water.    Powder. This is the least expensive. You mix one level scoop of powdered formula with two ounces of water and stir well.    Most babies need 2.5 ounces of formula per pound of body weight each day. This means an 8-pound baby may drink about 20 ounces of formula a day; however, this is just an estimate. The most important thing is to pay attention to your baby s cues.  If your baby is always fussy, needs more iron or has certain food allergies, your physician may suggest you change your baby s formula to a different kind.   How do I warm my baby s bottles?  You may feed your baby a bottle without warming it first. It is OK for the breast milk or formula to be cool or room temperature. If your baby seems to prefer it warmed, you can put the filled bottle in a container of warm water and let it stand for a few minutes. Check the temperature of the liquid on your skin before feeding it to your baby; to be sure it isn t too hot. Do not heat bottles in the microwave. Microwaves heat food and liquids unevenly, and this can cause hot spots that can burn your baby.    How do I clean and sterilize bottles?  Sterilize bottles and nipples before you use them for the first  time. You can do this by putting them in boiling water for 5 minutes. After that first time, you can wash them in hot and soapy water. Rinse them carefully to be sure there is no soap left on them. You can also wash them in the .    [facility] [phone] ([phone])

## 2021-06-25 NOTE — PROGRESS NOTES
Subjective:  More contractions since Tuesday    OB ROS:   Headache: None.   Vision change: None.   Abnormal vaginal discharge: None.   Bleeding: None.   Fetal movement: Normal.     Objective:  Reviewed vitals. Exam - see flowsheet.   Recent Results (from the past 24 hour(s))   Urinalysis, OB Screen   Result Value Ref Range    Glucose, UA Negative Negative    Ketones, UA Negative Negative    Protein, UA Negative Negative mg/dL      Assessment/Plan:    24 y.o.  at 37w6d    Order Summary                                                      1. Supervision of other high risk pregnancies, third trimester  Urinalysis, OB Screen      Completed by: Blanche Scruggs M.D., [facility]. 3/15/2019 11:54 AM.  Total time: 15 minutes. Greater than 50% spent in counseling and coordination of care regarding topics, above.

## 2021-07-14 PROBLEM — O09.899 HISTORY OF PRETERM DELIVERY, CURRENTLY PREGNANT: Status: RESOLVED | Noted: 2018-08-22 | Resolved: 2019-05-14

## 2021-09-17 ENCOUNTER — OFFICE VISIT (OUTPATIENT)
Dept: FAMILY MEDICINE | Facility: CLINIC | Age: 26
End: 2021-09-17
Payer: COMMERCIAL

## 2021-09-17 VITALS
OXYGEN SATURATION: 99 % | HEART RATE: 67 BPM | RESPIRATION RATE: 18 BRPM | DIASTOLIC BLOOD PRESSURE: 62 MMHG | WEIGHT: 156.25 LBS | SYSTOLIC BLOOD PRESSURE: 100 MMHG | BODY MASS INDEX: 32.66 KG/M2 | TEMPERATURE: 98.2 F

## 2021-09-17 DIAGNOSIS — H10.11 ALLERGIC CONJUNCTIVITIS, RIGHT: Primary | ICD-10-CM

## 2021-09-17 PROCEDURE — 99213 OFFICE O/P EST LOW 20 MIN: CPT | Performed by: PHYSICIAN ASSISTANT

## 2021-09-17 NOTE — PROGRESS NOTES
Assessment & Plan:      Problem List Items Addressed This Visit     None      Visit Diagnoses     Allergic conjunctivitis, right    -  Primary    Relevant Medications    Loratadine (ALAVERT PO)    ketotifen (ZADITOR) 0.025 % ophthalmic solution        Medical Decision Making  Patient presents with acute onset puffy, itchy, and right eye.  Examination appears consistent with allergic conjunctivitis.  No signs of foreign body bacterial conjunctivitis.  Will give patient allergy eyedrops as prescribed.  Also recommended cool compresses as needed.  No red flag symptoms of vision changes or fevers.  Discussed signs of worsening symptoms and when to follow-up with PCP if no symptom improvement.     Subjective:      History provided by the patient. She is also here with her  who is helping translate.  Wendy Alonzo is a 26 year old female here for evaluation of right eye redness, itchiness, and puffiness. Onset of symptoms was this morning. Patient was riding in the passenger side of the vehicle with the window down when she noted her eye felt irritated. She denies vision changes, eye crusting/purulent discharge, and foreign body sensation. She does have history of seasonal allergies with runny nose and itchy throat. She did take a dose of loratadine today.      The following portions of the patient's history were reviewed and updated as appropriate: allergies, current medications, and problem list.     Review of Systems  Pertinent items are noted in HPI.    Allergies  Allergies   Allergen Reactions     Black [place] Pollen Allergy Skin Test Itching     Itchy throat + eyes. No swelling/breathing problems.  Itchy throat + eyes. No swelling/breathing problems.       Family History   Problem Relation Age of Onset     Hepatitis Mother         Chronic hepatitis B     Tuberculosis Mother      No Known Problems Father      No Known Problems Son      No Known Problems Daughter       Birth Son 0.00        25 weeks 2 days      No Known Problems Daughter        Social History     Tobacco Use     Smoking status: Never Smoker     Smokeless tobacco: Never Used     Tobacco comment: no passive exposure   Substance Use Topics     Alcohol use: No        Objective:      /62   Pulse 67   Temp 98.2  F (36.8  C)   Resp 18   Wt 70.9 kg (156 lb 4 oz)   SpO2 99%   Breastfeeding Unknown   BMI 32.66 kg/m    General appearance - alert, well appearing, and in no distress and non-toxic  Eyes - Right: Sclera and conjunctivae are injected. No purulent drainage seen. PERRL. EOM intact. Left: Sclera and conjunctivae normal.

## 2021-09-17 NOTE — PATIENT INSTRUCTIONS
Patient Education     Conjunctivitis, Allergic    Conjunctivitis is an irritation of the thin membrane covering the eye and the inside of the eyelid. This membrane is called the conjunctiva. The condition is often called pink eye or red eye because the eye looks pink or red. The eye may also be swollen, itchy, burning, or tearing. A watery or mucous discharge may occur. This type of conjunctivitis is not contagious.   Allergic conjunctivitis is caused by an allergen. Allergens are substances that cause the body to react with certain symptoms. Allergens that cause eye irritation include things such as house dust, smoke, or pollen in the air. This can occur seasonally, most often in the spring. Other possible allergens that may cause symptoms include cosmetics, perfumes, animal saliva or dander, chlorine in swimming pools, or contact lens.   Home care    Eye drops may be prescribed to reduce itching and redness. Use these as directed. Otherwise, over-the-counter lubricating eye drops, sometimes referred to as artificial tears, may be used.    Apply a cool compress (towel soaked in cool water) to the affected eye 3 to 4 times a day to reduce swelling and itching.    It's common to have mucus drainage during the night, causing the eyelids to become crusted by morning. Use a warm, wet cloth to wipe this away. You may also use saline irrigating solution or artificial tears to rinse away mucus in the eye. Don't patch the eye.    You may use acetaminophen or ibuprofen to control pain, unless another medicine was prescribed. Talk with your healthcare provider if you have chronic liver or kidney disease before using these medicines. Also talk with your provider if you have ever had a stomach ulcer or digestive bleeding.    Don't wear contact lenses until your eyes have healed and all symptoms are gone.    Follow-up care  Follow up with your healthcare provider, or as advised.  When to seek medical advice  Call your healthcare  provider or seek medical care right away if any of these occur:     Increased eyelid swelling    New or worsening drainage from the eye    Increasing redness around the eye    Facial swelling  Cindy last reviewed this educational content on 4/1/2020 2000-2021 [employer]. All rights reserved. This information is not intended as a substitute for professional medical care. Always follow your healthcare professional's instructions.

## 2021-10-10 ENCOUNTER — HEALTH MAINTENANCE LETTER (OUTPATIENT)
Age: 26
End: 2021-10-10

## 2022-07-16 ENCOUNTER — HEALTH MAINTENANCE LETTER (OUTPATIENT)
Age: 27
End: 2022-07-16

## 2022-09-18 ENCOUNTER — HEALTH MAINTENANCE LETTER (OUTPATIENT)
Age: 27
End: 2022-09-18

## 2023-01-26 ENCOUNTER — MYC MEDICAL ADVICE (OUTPATIENT)
Dept: FAMILY MEDICINE | Facility: CLINIC | Age: 28
End: 2023-01-26
Payer: COMMERCIAL

## 2023-02-21 LAB
ABO/RH(D): NORMAL
ANTIBODY SCREEN: NEGATIVE
SPECIMEN EXPIRATION DATE: NORMAL

## 2023-02-22 ENCOUNTER — HOSPITAL ENCOUNTER (OUTPATIENT)
Dept: ULTRASOUND IMAGING | Facility: HOSPITAL | Age: 28
Discharge: HOME | End: 2023-02-22
Attending: PHYSICIAN ASSISTANT | Admitting: PHYSICIAN ASSISTANT
Payer: COMMERCIAL

## 2023-02-22 ENCOUNTER — PRENATAL OFFICE VISIT (OUTPATIENT)
Dept: FAMILY MEDICINE | Facility: CLINIC | Age: 28
End: 2023-02-22
Payer: COMMERCIAL

## 2023-02-22 VITALS
DIASTOLIC BLOOD PRESSURE: 52 MMHG | SYSTOLIC BLOOD PRESSURE: 94 MMHG | RESPIRATION RATE: 16 BRPM | OXYGEN SATURATION: 99 % | WEIGHT: 142 LBS | TEMPERATURE: 97.8 F | HEART RATE: 74 BPM | BODY MASS INDEX: 29.68 KG/M2

## 2023-02-22 DIAGNOSIS — Z3A.10 10 WEEKS GESTATION OF PREGNANCY: Primary | ICD-10-CM

## 2023-02-22 DIAGNOSIS — Z87.51 HISTORY OF PRETERM DELIVERY: ICD-10-CM

## 2023-02-22 DIAGNOSIS — B18.1 CHRONIC HEPATITIS B (H): ICD-10-CM

## 2023-02-22 DIAGNOSIS — Z3A.10 10 WEEKS GESTATION OF PREGNANCY: ICD-10-CM

## 2023-02-22 DIAGNOSIS — Z34.90 PREGNANCY, UNSPECIFIED GESTATIONAL AGE: ICD-10-CM

## 2023-02-22 PROBLEM — O03.9 MISCARRIAGE: Status: RESOLVED | Noted: 2020-06-29 | Resolved: 2023-02-22

## 2023-02-22 LAB
ALBUMIN UR-MCNC: NEGATIVE MG/DL
ALT SERPL W P-5'-P-CCNC: 6 U/L (ref 10–35)
BASOPHILS # BLD AUTO: 0 10E3/UL (ref 0–0.2)
BASOPHILS NFR BLD AUTO: 0 %
EOSINOPHIL # BLD AUTO: 0.1 10E3/UL (ref 0–0.7)
EOSINOPHIL NFR BLD AUTO: 2 %
ERYTHROCYTE [DISTWIDTH] IN BLOOD BY AUTOMATED COUNT: 13.4 % (ref 10–15)
GLUCOSE UR STRIP-MCNC: NEGATIVE MG/DL
HBA1C MFR BLD: 5.4 % (ref 0–5.6)
HCG UR QL: POSITIVE
HCT VFR BLD AUTO: 38.4 % (ref 35–47)
HCV AB SERPL QL IA: NONREACTIVE
HGB BLD-MCNC: 13 G/DL (ref 11.7–15.7)
HIV 1+2 AB+HIV1 P24 AG SERPL QL IA: NONREACTIVE
IMM GRANULOCYTES # BLD: 0 10E3/UL
IMM GRANULOCYTES NFR BLD: 0 %
KETONES UR STRIP-MCNC: NEGATIVE MG/DL
LYMPHOCYTES # BLD AUTO: 1.3 10E3/UL (ref 0.8–5.3)
LYMPHOCYTES NFR BLD AUTO: 20 %
MCH RBC QN AUTO: 28.4 PG (ref 26.5–33)
MCHC RBC AUTO-ENTMCNC: 33.9 G/DL (ref 31.5–36.5)
MCV RBC AUTO: 84 FL (ref 78–100)
MONOCYTES # BLD AUTO: 0.5 10E3/UL (ref 0–1.3)
MONOCYTES NFR BLD AUTO: 7 %
NEUTROPHILS # BLD AUTO: 4.8 10E3/UL (ref 1.6–8.3)
NEUTROPHILS NFR BLD AUTO: 71 %
PLATELET # BLD AUTO: 205 10E3/UL (ref 150–450)
RBC # BLD AUTO: 4.57 10E6/UL (ref 3.8–5.2)
T PALLIDUM AB SER QL: NONREACTIVE
WBC # BLD AUTO: 6.7 10E3/UL (ref 4–11)

## 2023-02-22 PROCEDURE — 81003 URINALYSIS AUTO W/O SCOPE: CPT | Performed by: PHYSICIAN ASSISTANT

## 2023-02-22 PROCEDURE — 86900 BLOOD TYPING SEROLOGIC ABO: CPT | Performed by: PHYSICIAN ASSISTANT

## 2023-02-22 PROCEDURE — 76801 OB US < 14 WKS SINGLE FETUS: CPT

## 2023-02-22 PROCEDURE — 90686 IIV4 VACC NO PRSV 0.5 ML IM: CPT | Performed by: PHYSICIAN ASSISTANT

## 2023-02-22 PROCEDURE — 86901 BLOOD TYPING SEROLOGIC RH(D): CPT | Performed by: PHYSICIAN ASSISTANT

## 2023-02-22 PROCEDURE — 99214 OFFICE O/P EST MOD 30 MIN: CPT | Mod: 25 | Performed by: PHYSICIAN ASSISTANT

## 2023-02-22 PROCEDURE — 84460 ALANINE AMINO (ALT) (SGPT): CPT | Performed by: PHYSICIAN ASSISTANT

## 2023-02-22 PROCEDURE — 87491 CHLMYD TRACH DNA AMP PROBE: CPT | Performed by: PHYSICIAN ASSISTANT

## 2023-02-22 PROCEDURE — 81025 URINE PREGNANCY TEST: CPT | Performed by: PHYSICIAN ASSISTANT

## 2023-02-22 PROCEDURE — 86850 RBC ANTIBODY SCREEN: CPT | Performed by: PHYSICIAN ASSISTANT

## 2023-02-22 PROCEDURE — 99207 PR PRENATAL FLOW SHEET: CPT | Performed by: PHYSICIAN ASSISTANT

## 2023-02-22 PROCEDURE — 36415 COLL VENOUS BLD VENIPUNCTURE: CPT | Performed by: PHYSICIAN ASSISTANT

## 2023-02-22 PROCEDURE — 87086 URINE CULTURE/COLONY COUNT: CPT | Performed by: PHYSICIAN ASSISTANT

## 2023-02-22 PROCEDURE — 86803 HEPATITIS C AB TEST: CPT | Performed by: PHYSICIAN ASSISTANT

## 2023-02-22 PROCEDURE — 87517 HEPATITIS B DNA QUANT: CPT | Performed by: PHYSICIAN ASSISTANT

## 2023-02-22 PROCEDURE — 86780 TREPONEMA PALLIDUM: CPT | Performed by: PHYSICIAN ASSISTANT

## 2023-02-22 PROCEDURE — 85025 COMPLETE CBC W/AUTO DIFF WBC: CPT | Performed by: PHYSICIAN ASSISTANT

## 2023-02-22 PROCEDURE — 99000 SPECIMEN HANDLING OFFICE-LAB: CPT | Performed by: PHYSICIAN ASSISTANT

## 2023-02-22 PROCEDURE — 87389 HIV-1 AG W/HIV-1&-2 AB AG IA: CPT | Performed by: PHYSICIAN ASSISTANT

## 2023-02-22 PROCEDURE — 90471 IMMUNIZATION ADMIN: CPT | Performed by: PHYSICIAN ASSISTANT

## 2023-02-22 PROCEDURE — 83036 HEMOGLOBIN GLYCOSYLATED A1C: CPT | Performed by: PHYSICIAN ASSISTANT

## 2023-02-22 PROCEDURE — 86762 RUBELLA ANTIBODY: CPT | Performed by: PHYSICIAN ASSISTANT

## 2023-02-22 PROCEDURE — 87340 HEPATITIS B SURFACE AG IA: CPT | Performed by: PHYSICIAN ASSISTANT

## 2023-02-22 PROCEDURE — 83655 ASSAY OF LEAD: CPT | Mod: 90 | Performed by: PHYSICIAN ASSISTANT

## 2023-02-22 PROCEDURE — 87341 HEP B SURFACE AG NEUTRLZJ IA: CPT | Performed by: PHYSICIAN ASSISTANT

## 2023-02-22 RX ORDER — PNV NO.95/FERROUS FUM/FOLIC AC 28MG-0.8MG
1 TABLET ORAL DAILY
Qty: 100 CAPSULE | Refills: 3 | Status: SHIPPED | OUTPATIENT
Start: 2023-02-22 | End: 2023-12-06

## 2023-02-22 NOTE — PATIENT INSTRUCTIONS
Pregnancy: 10 weeks    Due Date: September 14, 2023    Next visit in 4 weeks  With Dr. Scruggs  For Your First OB Visit        Someone should be calling you within 2-3 days to schedule your ultrasound appointment.  If you would like to schedule your ultrasound yourself, call #[phone].

## 2023-02-22 NOTE — LETTER
02/22/23          To Whom It May Concern:      Wendy Alonzo (1995) is pregnant.  Estimated Date of Delivery: Sep 14, 2023        Sincerely,    Johanna Isbell PA-C

## 2023-02-22 NOTE — PROGRESS NOTES
ASSESSMENT and PLAN:  1. Pregnancy Intake Appointment  Wendy Alonzo is 27 year old and .  Patient's last menstrual period was 2022 (approximate).  Estimated Date of Delivery: Sep 14, 2023  She will be seeing Dr. Scruggs for OB care at [facility].  Screening pregnancy lab work was drawn.  Prenatal vitamin prescribed.  Problem list and current medications reviewed and updated.  Dating ultrasound ordered.  Potential exposures/risks discussed: work environment, raw meat/seafood/deli meat, home construction, cats, caffeine.  Follow up in 4 weeks.    2. Chronic hepatitis B (H)  Screening labs done today.  - Hep B Virus DNA Quant Real Time PCR; Future  - ALT; Future    3. History of  delivery  History of  delivery at 25 weeks in 2017.          HPI:  Wendy Alonzo is a 27 year old female here for pregnancy intake.  She is .  Patient's last menstrual period was 2022 (approximate).  Positive home pregnancy test in January.    Past Medical History:   Diagnosis Date     History of  delivery, currently pregnant 2018    Spontaneous  at 25w2d . Plan: IM progesterone ([place]) 16-36 weeks, consult OB/gyn - Dr. Dasilva, cervical length US at 16 weeks.     Infectious viral hepatitis     Hep B     Miscarriage 2020      delivery, delivered 2017    Delivered at 25 weeks 2 days. Spontaneous  labor started at 24 weeks 3 days.      labor 2017    Ctx @ 24w3d.  Fetal fibronectin positive & admitted to [facility] @ 24w6d, given betamethasone, magnesium and terbutaline tocolysis. Delivered @ 25w2d. BW 1 lb 14 oz.         Past Surgical History:   Procedure Laterality Date     NC SURG RX MISSED ABORTN,1ST TRI N/A 2020    Procedure: SUCTION DILATION AND CURETTAGE;  Surgeon: Darshana Eagle DO;  Location: [facility];  Service: Gynecology        Current Outpatient Medications   Medication     acetaminophen (TYLENOL) 500 MG tablet      ketotifen (ZADITOR) 0.025 % ophthalmic solution     Loratadine (ALAVERT PO)     Prenatal MV-Min-Fe Fum-FA-DHA (PRENATAL MULTIVITAMIN PLUS DHA) 27-0.8-250 MG CAPS     No current facility-administered medications for this visit.          OBJECTIVE:  Allergies   Allergen Reactions     Black [place] Pollen Allergy Skin Test Itching     Itchy throat + eyes. No swelling/breathing problems.  Itchy throat + eyes. No swelling/breathing problems.     BP 94/52 (BP Location: Right arm, Patient Position: Sitting, Cuff Size: Adult Regular)   Pulse 74   Temp 97.8  F (36.6  C) (Temporal)   Resp 16   Wt 64.4 kg (142 lb)   LMP 12/08/2022 (Approximate)   SpO2 99%   BMI 29.68 kg/m     Body mass index is 29.68 kg/m .     Vital signs stable as recorded above.  General: Patient is alert and oriented x 3, in no apparent distress  Fetal Exam: FHR not heard, fundal height not palpable    Prenatal Office Visit on 02/22/2023   Component Date Value Ref Range Status     hCG Urine Qualitative 02/22/2023 Positive (A)  Negative Final          Other screening pregnancy lab work is pending.      Please see OB Episode for complete details.    This dictation uses voice recognition software, which may contain typographical errors.

## 2023-02-23 LAB
C TRACH DNA SPEC QL NAA+PROBE: NEGATIVE
HBV DNA SERPL NAA+PROBE-ACNC: ABNORMAL IU/ML
HBV DNA SERPL NAA+PROBE-LOG IU: >8.2 {LOG_IU}/ML
HBV SURFACE AG SERPL QL IA: REACTIVE
RUBV IGG SERPL QL IA: 13.1 INDEX
RUBV IGG SERPL QL IA: POSITIVE

## 2023-02-24 LAB — BACTERIA UR CULT: NORMAL

## 2023-02-25 PROBLEM — Z87.51 HISTORY OF PRETERM DELIVERY: Status: ACTIVE | Noted: 2018-08-22

## 2023-02-25 LAB — LEAD BLDV-MCNC: <2 UG/DL

## 2023-02-27 PROBLEM — O09.90 SUPERVISION OF HIGH RISK PREGNANCY, ANTEPARTUM: Status: ACTIVE | Noted: 2023-02-27

## 2023-04-06 ENCOUNTER — PRENATAL OFFICE VISIT (OUTPATIENT)
Dept: FAMILY MEDICINE | Facility: CLINIC | Age: 28
End: 2023-04-06
Payer: COMMERCIAL

## 2023-04-06 VITALS
WEIGHT: 146 LBS | HEART RATE: 66 BPM | SYSTOLIC BLOOD PRESSURE: 100 MMHG | OXYGEN SATURATION: 99 % | BODY MASS INDEX: 29.43 KG/M2 | DIASTOLIC BLOOD PRESSURE: 59 MMHG | HEIGHT: 59 IN | TEMPERATURE: 98 F

## 2023-04-06 DIAGNOSIS — B18.1 CHRONIC HEPATITIS B (H): ICD-10-CM

## 2023-04-06 DIAGNOSIS — Z87.51 HISTORY OF PRETERM DELIVERY: Primary | ICD-10-CM

## 2023-04-06 DIAGNOSIS — Z87.51 HISTORY OF PRETERM DELIVERY: ICD-10-CM

## 2023-04-06 DIAGNOSIS — O09.90 SUPERVISION OF HIGH RISK PREGNANCY, ANTEPARTUM: Primary | ICD-10-CM

## 2023-04-06 PROBLEM — E66.3 OVERWEIGHT: Status: ACTIVE | Noted: 2019-05-14

## 2023-04-06 LAB
ALBUMIN UR-MCNC: NEGATIVE MG/DL
GLUCOSE UR STRIP-MCNC: NEGATIVE MG/DL
HCG UR QL: POSITIVE
KETONES UR STRIP-MCNC: NEGATIVE MG/DL

## 2023-04-06 PROCEDURE — 81025 URINE PREGNANCY TEST: CPT | Performed by: FAMILY MEDICINE

## 2023-04-06 PROCEDURE — 81003 URINALYSIS AUTO W/O SCOPE: CPT | Performed by: FAMILY MEDICINE

## 2023-04-06 PROCEDURE — 99207 PR PRENATAL VISIT: CPT | Performed by: FAMILY MEDICINE

## 2023-04-06 NOTE — PATIENT INSTRUCTIONS
BIRTH AND 17-alpha hydroxyprogesterone caproate (17P) TREATMENT    What is  birth?      birth is the delivery of a baby before 37 weeks of gestation.  Approximately 1 in every 12 babies in [place] is born premature (that s approximately 6,000 babies every year)!     birth is often unexpected, but can happen for many reasons. There are some known risk factors, which include:   -pregnancy with twins or triplets   -being  race  -some problems with the uterus or cervix   -some medical problems like high blood pressure   -smoking, drinking, or using illegal drugs while pregnant   -infections like urinary tract infection, bacterial vaginosis and chlamydia while    pregnant  -depression, stress, and anxiety    But the biggest risk factor is having a previous  baby. In fact, women who have one  birth have a 30-50% chance of their next baby coming early too.     What are the risks to a baby that delivers prematurely?     birth is the number one cause of  death worldwide. This risk increases the earlier the baby is born.  Prematurity can also cause serious long term health problems for babies such as breathing problems, infections, bleeding into the brain, as well as long term developmental problems like cerebral palsy, learning impairments, hearing and vision problems.    How can I prevent  birth in my pregnancy?  Overall, the best thing to prevent  delivery is to stay healthy during your pregnancy, and follow up with your doctor for your regular visits and screening tests.  If you have a history of  birth in one of your past pregnancies, you may benefit from weekly injections of 17P.     What is 17P?  The full name is 17-alpha hydroxyprogesterone caproate. This is a form of your body s natural  pregnancy hormone , progesterone. The brand name of this is Cecily, and it is FDA approved for prevention of  birth.  This medication  is given in once weekly injections from week 16-20 through the 36th week of pregnancy. Research shows that women taking 17P can reduce their risk of  birth from 50% to 36%. The treatment has also been shown to reduce the risk of complications after birth, such as bleeding in the brain and need for supplemental oxygen.    It is important to complete the treatment once you start, as the risk of  birth goes up if you stop the injections early.    How can I get started on the Cecily treatment?  First, you should talk with your doctor to find out if this is a good option for you.  It is safe for pregnant women and for the fetus, but if you have some medical problems like uncontrolled blood pressure, breast cancer, or liver disease you should talk about it with your doctor first.  Your clinic will work with you to help determine insurance coverage and preauthorization if needed.  Then you will schedule weekly visits for 17P injections in addition to your routine prenatal visits with your doctor.    Side Effects of Mekena  The most common side effects  reported by Virgil users are   injection site reactions (pain [35%], swelling  [17%], pruritus (itching) [6%], nodule [5%]), urticaria (rash) (12%), pruritus (generalized itching (8%), nausea (6%), and diarrhea (2%).    BIRTH AND 17-alpha hydroxyprogesterone caproate (17P) TREATMENT    What is  birth?      birth is the delivery of a baby before 37 weeks of gestation.  Approximately 1 in every 12 babies in [place] is born premature (that s approximately 6,000 babies every year)!     birth is often unexpected, but can happen for many reasons. There are some known risk factors, which include:   -pregnancy with twins or triplets   -being  race  -some problems with the uterus or cervix   -some medical problems like high blood pressure   -smoking, drinking, or using illegal drugs while pregnant   -infections like urinary tract  infection, bacterial vaginosis and chlamydia while    pregnant  -depression, stress, and anxiety    But the biggest risk factor is having a previous  baby. In fact, women who have one  birth have a 30-50% chance of their next baby coming early too.     What are the risks to a baby that delivers prematurely?     birth is the number one cause of  death worldwide. This risk increases the earlier the baby is born.  Prematurity can also cause serious long term health problems for babies such as breathing problems, infections, bleeding into the brain, as well as long term developmental problems like cerebral palsy, learning impairments, hearing and vision problems.    How can I prevent  birth in my pregnancy?  Overall, the best thing to prevent  delivery is to stay healthy during your pregnancy, and follow up with your doctor for your regular visits and screening tests.  If you have a history of  birth in one of your past pregnancies, you may benefit from weekly injections of 17P.     What is 17P?  The full name is 17-alpha hydroxyprogesterone caproate. This is a form of your body s natural  pregnancy hormone , progesterone. The brand name of this is Cecily, and it is FDA approved for prevention of  birth.  This medication is given in once weekly injections from week 16-20 through the 36th week of pregnancy. Research shows that women taking 17P can reduce their risk of  birth from 50% to 36%. The treatment has also been shown to reduce the risk of complications after birth, such as bleeding in the brain and need for supplemental oxygen.    It is important to complete the treatment once you start, as the risk of  birth goes up if you stop the injections early.    How can I get started on the [place] treatment?  First, you should talk with your doctor to find out if this is a good option for you.  It is safe for pregnant women and for the fetus, but if you  have some medical problems like uncontrolled blood pressure, breast cancer, or liver disease you should talk about it with your doctor first.  Your clinic will work with you to help determine insurance coverage and preauthorization if needed.  Then you will schedule weekly visits for 17P injections in addition to your routine prenatal visits with your doctor.    Side Effects of Mekena  The most common side effects  reported by Roslyn users are   injection site reactions (pain [35%], swelling  [17%], pruritus (itching) [6%], nodule [5%]), urticaria (rash) (12%), pruritus (generalized itching (8%), nausea (6%), and diarrhea (2%).    BIRTH AND 17-alpha hydroxyprogesterone caproate (17P) TREATMENT    What is  birth?      birth is the delivery of a baby before 37 weeks of gestation.  Approximately 1 in every 12 babies in [place] is born premature (that s approximately 6,000 babies every year)!     birth is often unexpected, but can happen for many reasons. There are some known risk factors, which include:   -pregnancy with twins or triplets   -being  race  -some problems with the uterus or cervix   -some medical problems like high blood pressure   -smoking, drinking, or using illegal drugs while pregnant   -infections like urinary tract infection, bacterial vaginosis and chlamydia while    pregnant  -depression, stress, and anxiety    But the biggest risk factor is having a previous  baby. In fact, women who have one  birth have a 30-50% chance of their next baby coming early too.     What are the risks to a baby that delivers prematurely?     birth is the number one cause of  death worldwide. This risk increases the earlier the baby is born.  Prematurity can also cause serious long term health problems for babies such as breathing problems, infections, bleeding into the brain, as well as long term developmental problems like cerebral palsy, learning  impairments, hearing and vision problems.    How can I prevent  birth in my pregnancy?  Overall, the best thing to prevent  delivery is to stay healthy during your pregnancy, and follow up with your doctor for your regular visits and screening tests.  If you have a history of  birth in one of your past pregnancies, you may benefit from weekly injections of 17P.     What is 17P?  The full name is 17-alpha hydroxyprogesterone caproate. This is a form of your body s natural  pregnancy hormone , progesterone. The brand name of this is [place], and it is FDA approved for prevention of  birth.  This medication is given in once weekly injections from week 16-20 through the 36th week of pregnancy. Research shows that women taking 17P can reduce their risk of  birth from 50% to 36%. The treatment has also been shown to reduce the risk of complications after birth, such as bleeding in the brain and need for supplemental oxygen.    It is important to complete the treatment once you start, as the risk of  birth goes up if you stop the injections early.    How can I get started on the Cecily treatment?  First, you should talk with your doctor to find out if this is a good option for you.  It is safe for pregnant women and for the fetus, but if you have some medical problems like uncontrolled blood pressure, breast cancer, or liver disease you should talk about it with your doctor first.  Your clinic will work with you to help determine insurance coverage and preauthorization if needed.  Then you will schedule weekly visits for 17P injections in addition to your routine prenatal visits with your doctor.    Side Effects of Cecily  The most common side effects  reported by Cecily users are   injection site reactions (pain [35%], swelling  [17%], pruritus (itching) [6%], nodule [5%]), urticaria (rash) (12%), pruritus (generalized itching (8%), nausea (6%), and diarrhea (2%).     Pregnancy:  Your Second Trimester Changes  Each day, you and your baby are changing and growing together. Here s a quick look at what s happening to both of you.  How you are changing  Even when you don t notice it, your body is adapting to meet the needs of your growing baby. The changes in your body might also affect your moods.  Your body  Your uterus expands as your baby grows. As the weeks go by, you will feel more pressure on your bladder, stomach, and other organs. You may notice some skin color changes on your forehead, nose, or cheeks. Freckles may darken, and moles may grow. You may notice a darker line on your abdomen between your belly button and pubic bone in the midline.  Your moods  The second trimester is often easier than the first. Still, be prepared for mood swings. These are from the increase in hormones made by your body. Hormones are chemicals that affect the way organs work. These mood swings are a normal part of pregnancy.  How your baby is growing    Month 4  Your baby s heartbeat may be heard with a Doppler (handheld ultrasound device) by 9 to 10 weeks. Eyebrows, eyelashes, and fingernails begin to form.    Month 5  You may feel your baby move. After a growth spurt, your baby nears 10 inches.    Month 6  Your baby s fingerprints have formed. Your baby weighs about 1 to 2 pounds and is about 12 inches long.    [employer] last reviewed this educational content on 7/1/2021 2000-2022 [employer]. All rights reserved. This information is not intended as a substitute for professional medical care. Always follow your healthcare professional's instructions.

## 2023-04-06 NOTE — PROGRESS NOTES
"First OB Appointment    Assessment & Plan:  1. Dating: Final EDC is Sep 21, 2023. LMP 22 - \"probably around the second week of December\". Will use the 9w6d US for dates.  2. Aspirin: Based on her risk factors, Wendy is NOT at high risk of preeclampsia. Low-dose aspirin prophylaxis is NOT recommended for prevention of preeclampsia.   3. Weight: Based on prepregnancy 28.60, recommended weight gain of 7 kg (15 lb)-11.5 kg (25 lb).  4. Diabetes risk: Based on her risk factors, Wendy is NOT at increased risk of DM2/GDM.   5. Trisomy screening: declines  6. Carrier screening for SMA and CF: declines  7. History of spontaneous  delivery at 25 weeks 2017. Had full term delivery  with Cecily. Referral to [facility].  She would be willing to do [place] again. But we discussed changing recommendations.   8. Chronic hep B high viral load. Discussed Tenofovir. GI referral made.     Order Summary    ICD-10-CM    1. Supervision of high risk pregnancy, antepartum  O09.90 Urinalysis, OB Screen     Urinalysis, OB Screen      2. History of  delivery  Z87.51 [facility] Referral - Pregnancy      3. Chronic hepatitis B (H)  B18.1 Adult GI  Referral - Consult Only         Future Appointments   Date Time Provider Department Center   2023  8:00 AM Blanche Scruggs MD [facility]   2023  9:00 AM Blanche Scruggs MD [facility]     Completed by: Blanche Scruggs M.D., [facility]. 2023 7:55 AM.  This transcription uses voice recognition software, which may contain typographical errors.    Subjective:  This is a unplanned pregnancy. Current pregnancy symptoms include: bloating with eating, even small amounts. Even drinking water causes bloating.     Preeclampsia risk factors - at increased risk if 1+ high risk or 2+ moderate risk factors    High risk factors (1+):NONE    Moderate risk factors (2+): NONE     Diabetes risk factors - at increased risk if BMI 25+ and 1+ additional risk " factors      Prepregnancy 28.60     Additional risk factors (1+): Ethnicity: , , ,  American,     I reviewed the following components of the patient chart today:    OB intake note    Active problems    Past Medical History    Medications    Allergies    Family History    Social History    Genetic Questionairre    Prenatal Labs    Prenatal Ultrasound    OB history  OB History    Para Term  AB Living   6 4 3 1 1 4   SAB IAB Ectopic Multiple Live Births   1 0 0 0 4      # Outcome Date GA Lbr Noah/2nd Weight Sex Delivery Anes PTL Lv   6 Current            5 SAB 20     AB, MISSED         Birth Comments: D&C   4 Term 19 38w3d 16:14 / 00:09 3.09 kg (6 lb 13 oz) F Vag-Spont None N ROXANNE      Birth Comments: Chronic hep B. Transverse lie in labor, successful ECV. . Postpartum atony (pitocin, cytotec).      Name: Janina      Apgar1: 9  Apgar5: 10   3  17 25w2d 02:14 / 00:17 0.86 kg (1 lb 14.3 oz) M Vag-Spont None Y ROXANNE      Birth Comments: Spontaneous  labor at 24+6 weeks. Admitted and treated with magnesium, betamethasone. See placental pathology notes.      Complications:  delivery, Premature delivery      Name: Naresh Alonzo      Apgar1: 8  Apgar5: 8   2 Term 16 39w4d 06:26 / 00:08 3.09 kg (6 lb 13 oz) M Vag-Spont None N ROXANNE      Birth Comments: High hepatitis B viral load, declined treatment. Spontaneous labor without complication.  mL      Complications: Chronic hepatitis B (H), Short interval between pregnancies affecting pregnancy in second trimester, antepartum, Chronic hepatitis B virus infection (H)      Name: Simone James      Apgar1: 8  Apgar5: 9   1 Term 11/16/15 41w1d 09:14 / 00:29 3.062 kg (6 lb 12 oz) F Vag-Spont None N ROXANNE      Birth Comments: Spontaneous labor.  on birthing stool. Brisk bleeding after placenta.  mL.      Complications: Chronic hepatitis B (H), Insufficient  "weight gain during pregnancy, Chronic hepatitis B virus infection (H)      Name: Shira Ramirez      Apgar1: 9  Apgar5: 9     Tdap Influenza Covid   01/25/2019  TDAP (Adacel,Boostrix) 02/22/2023 12/17/2021-COVID-19 Vaccine 12+ (Pfizer)     01/28/2022-COVID-19 Vaccine 12+ (Pfizer 2022)     12/31/2022-COVID-19 Vaccine Bivalent Booster 12+ (Pfizer)     -     Objective:  /59 (BP Location: Left arm, Patient Position: Sitting, Cuff Size: Adult Regular)   Pulse 66   Temp 98  F (36.7  C) (Temporal)   Ht 1.49 m (4' 10.66\")   Wt 66.2 kg (146 lb)   LMP 12/08/2022 (Approximate)   SpO2 99%   BMI 29.83 kg/m   Body mass index is 29.83 kg/m .   See prenatal flowsheet and physical exam portion of prenatal episode.    MDM: [facility] neighborhood: [place] [address], language: Hmong, MDM: 40 minutes spent on the date of the encounter doing chart review, history and exam, documentation and further activities per the note.  "

## 2023-04-14 NOTE — LETTER
April 14, 2023       TO: Wendy Alonzo  [address] [place] [address]         Dear Wendy,     Our records indicate that you have not scheduled an appointment for a Hepatology consult, as recommended by Dr. Blanche Scruggs. If you wish to schedule within [facility], we have several options to help you schedule your appointment:      Call [phone] Monday-Friday 7 am to 5 pm and we will be happy to assist you.       You can also request an appointment via Andel if applicable or by visiting [email or web address]     If you have chosen to schedule elsewhere or if you have already made an appointment, please disregard this letter.    If you have any questions or concerns regarding the information above, please contact the [facility] [place] at [phone].      Sincerely,      [facility]

## 2023-04-14 NOTE — LETTER
April 14, 2023  Re: Wendy Alonzo  YOB: 1995    Dear Colleague,    Thank you for your referral to the [facility]PAT[facility]. We have been unable to reach the patient after multiple contact attempts.      If you have any questions or concerns, please contact our office at Dept: [phone].        Sincerely,  [facility]

## 2023-04-19 ENCOUNTER — PRE VISIT (OUTPATIENT)
Dept: MATERNAL FETAL MEDICINE | Facility: HOSPITAL | Age: 28
End: 2023-04-19
Payer: COMMERCIAL

## 2023-04-27 ENCOUNTER — ANCILLARY PROCEDURE (OUTPATIENT)
Dept: ULTRASOUND IMAGING | Facility: HOSPITAL | Age: 28
End: 2023-04-27
Attending: OBSTETRICS & GYNECOLOGY
Payer: COMMERCIAL

## 2023-04-27 ENCOUNTER — TRANSFERRED RECORDS (OUTPATIENT)
Dept: HEALTH INFORMATION MANAGEMENT | Facility: CLINIC | Age: 28
End: 2023-04-27

## 2023-04-27 ENCOUNTER — OFFICE VISIT (OUTPATIENT)
Dept: MATERNAL FETAL MEDICINE | Facility: HOSPITAL | Age: 28
End: 2023-04-27
Attending: OBSTETRICS & GYNECOLOGY
Payer: COMMERCIAL

## 2023-04-27 DIAGNOSIS — O98.412 HEPATITIS B AFFECTING PREGNANCY IN SECOND TRIMESTER: ICD-10-CM

## 2023-04-27 DIAGNOSIS — O28.3 ECHOGENIC INTRACARDIAC FOCUS OF FETUS ON PRENATAL ULTRASOUND: Primary | ICD-10-CM

## 2023-04-27 DIAGNOSIS — Z87.51 HISTORY OF PRETERM DELIVERY: ICD-10-CM

## 2023-04-27 DIAGNOSIS — B19.10 HEPATITIS B AFFECTING PREGNANCY IN SECOND TRIMESTER: ICD-10-CM

## 2023-04-27 PROBLEM — O35.BXX0 ECHOGENIC FOCUS OF HEART OF FETUS AFFECTING ANTEPARTUM CARE OF MOTHER: Status: ACTIVE | Noted: 2023-04-27

## 2023-04-27 PROCEDURE — 99244 OFF/OP CNSLTJ NEW/EST MOD 40: CPT | Mod: 25 | Performed by: OBSTETRICS & GYNECOLOGY

## 2023-04-27 PROCEDURE — 76817 TRANSVAGINAL US OBSTETRIC: CPT | Mod: 26 | Performed by: OBSTETRICS & GYNECOLOGY

## 2023-04-27 PROCEDURE — G0463 HOSPITAL OUTPT CLINIC VISIT: HCPCS | Mod: 25 | Performed by: OBSTETRICS & GYNECOLOGY

## 2023-04-27 PROCEDURE — 76811 OB US DETAILED SNGL FETUS: CPT | Mod: 26 | Performed by: OBSTETRICS & GYNECOLOGY

## 2023-04-27 PROCEDURE — 76811 OB US DETAILED SNGL FETUS: CPT

## 2023-04-27 NOTE — PROGRESS NOTES
Maternal-Fetal Medicine Consultation    Wendy Alonzo  : 1995  MRN: [id number]    REFERRAL:  Wendy Alonzo is a 28 year old sent by Dr. Scruggs for consultation due to pregnancy complicated by a history of prior  birth.   HPI:  Wendy Alonzo is a 28 year old  at 19w0d by 9w 6d ultrasound here for consultation regarding history of prior  birth. Ms. Alonzo had two full term deliveries, followed by  delivery in her third pregnancy. In her third pregnancy she had onset of contractions at around 24w 3d and was admitted for  labor at 24w 6d. She subsequently delivered at 25w 2d. In her fourth pregnancy she was treated with [place], and subsequently had a term delivery at 38w 3d.    Pregnancy complicated by:  1) History of prior  birth at 25w 2d  2) Chronic hepatitis B - referred to GI    Obstetrics History:  OB History    Para Term  AB Living   6 4 3 1 1 4   SAB IAB Ectopic Multiple Live Births   1 0 0 0 4      # Outcome Date GA Lbr Noah/2nd Weight Sex Delivery Anes PTL Lv   6 Current            5 SAB 20     AB, MISSED         Birth Comments: D&C   4 Term 19 38w3d 16:14 / 00:09 3.09 kg (6 lb 13 oz) F Vag-Spont None N ROXANNE      Birth Comments: Chronic hep B. Transverse lie in labor, successful ECV. . Postpartum atony (pitocin, cytotec).      Name: Janina      Apgar1: 9  Apgar5: 10   3  17 25w2d 02:14 / 00:17 0.86 kg (1 lb 14.3 oz) M Vag-Spont None Y ROXANNE      Birth Comments: Spontaneous  labor at 24+6 weeks. Admitted and treated with magnesium, betamethasone. See placental pathology notes.      Complications:  delivery, Premature delivery      Name: Naresh Alonzo      Apgar1: 8  Apgar5: 8   2 Term 16 39w4d 06:26 / 00:08 3.09 kg (6 lb 13 oz) M Vag-Spont None N ROXANNE      Birth Comments: High hepatitis B viral load, declined treatment. Spontaneous labor without complication.  mL      Complications: Chronic hepatitis B (H), Short  interval between pregnancies affecting pregnancy in second trimester, antepartum, Chronic hepatitis B virus infection (H)      Name: Simone Ramirez      Apgar1: 8  Apgar5: 9   1 Term 11/16/15 41w1d 09:14 / 00:29 3.062 kg (6 lb 12 oz) F Vag-Spont None N ROXANNE      Birth Comments: Spontaneous labor.  on birthing stool. Brisk bleeding after placenta.  mL.      Complications: Chronic hepatitis B (H), Insufficient weight gain during pregnancy, Chronic hepatitis B virus infection (H)      Name: Shira Ramirez      Apgar1: 9  Apgar5: 9     Past Medical History:  Past Medical History:   Diagnosis Date     History of  delivery, currently pregnant 2018    Spontaneous  at 25w2d . Plan: IM progesterone (Cecily) 16-36 weeks, consult OB/gyn - Dr. Dasilva, cervical length US at 16 weeks.     Infectious viral hepatitis     Hep B     Miscarriage 2020      delivery, delivered 2017    Delivered at 25 weeks 2 days. Spontaneous  labor started at 24 weeks 3 days.      labor 2017    Ctx @ 24w3d.  Fetal fibronectin positive & admitted to [facility] @ 24w6d, given betamethasone, magnesium and terbutaline tocolysis. Delivered @ 25w2d. BW 1 lb 14 oz.      Past Surgical History:  Past Surgical History:   Procedure Laterality Date     WI SURG RX MISSED ABORTN,1ST TRI N/A 2020    Procedure: SUCTION DILATION AND CURETTAGE;  Surgeon: Darshana Eagle DO;  Location: [facility];  Service: Gynecology     Current Medications:  Prior to Admission medications    Medication Sig Last Dose Taking? Auth Provider Long Term End Date   acetaminophen (TYLENOL) 500 MG tablet [ACETAMINOPHEN (TYLENOL) 500 MG TABLET] Take 1 tablet (500 mg total) by mouth every 6 (six) hours as needed for pain.  Patient not taking: Reported on 2023   Darshana Eagle DO     Loratadine (ALAVERT PO)    Reported, Patient     Prenatal MV-Min-Fe Fum-FA-DHA (PRENATAL MULTIVITAMIN PLUS DHA) 27-0.8-250  MG CAPS Take 1 tablet by mouth daily OK to substitute formulary equivalent   Johanna Isbell PA-C       Allergies:  Black walnut pollen allergy skin test    Social History:   Social History     Socioeconomic History     Marital status: Single     Spouse name: Not on file     Number of children: 4     Years of education: Not on file     Highest education level: Not on file   Occupational History     Not on file   Tobacco Use     Smoking status: Never     Passive exposure: Never     Smokeless tobacco: Never     Tobacco comments:     no passive exposure   Vaping Use     Vaping status: Never Used   Substance and Sexual Activity     Alcohol use: No     Drug use: No     Sexual activity: Yes     Partners: Male     Birth control/protection: None     Comment: Undecided re: contraception    Other Topics Concern     Not on file   Social History Narrative    : Berenice Ramirez. Kids: Shira 2015, Simone 2016, Naresh 2017, Janina 2019.     Social Determinants of Health     Financial Resource Strain: Not on file   Food Insecurity: Not on file   Transportation Needs: Not on file   Physical Activity: Not on file   Stress: Not on file   Social Connections: Not on file   Intimate Partner Violence: Not on file   Housing Stability: Not on file       Family History:  Family History   Problem Relation Age of Onset     Hepatitis Mother         Chronic hepatitis B     Tuberculosis Mother      No Known Problems Father      No Known Problems Son      No Known Problems Daughter       Birth Son 0.00        25 weeks 2 days     No Known Problems Daughter      PHYSICAL EXAM:  Gen: NAD, well appearing  Chest: Non-labored breathing  Abdomen: gravid    Labs:     Lab Results   Component Value Date    AS Negative 2023    HEPBANG Reactive (A) 2023    CHPCRT Negative 2023    GCPCRT Negative 2020    HGB 13.0 2023     Genetic Testing: Declined    Ultrasounds:   Please see the imaging tab for details of the  ultrasound performed today.     ASSESSMENT:  28 year old y.o.  at 19w0d by 9w 6d ultrasound here for consultation regarding prior history of  birth, followed by term delivery.    # History of  birth  Today we discussed the incidence and epidemiology of  birth (PTB). There are multiple etiologies of spontaneous PTB, including but not limited to infection, bleeding, uterine distension, cervical insufficiency and stress.   However, most spontaneous  deliveries occur in patients with no risk factors.  A history of prior  delivery is a significant risk factor for recurrence in a subsequent pregnancy.  Recurrent  delivery has been linked to maternal ethnicity, genitourinary infection, and especially gestational age at the first  delivery: the earlier the delivery, the greater the likelihood of recurrence.  Recurrent  birth can happen at the same gestational age as a prior  birth but that it cannot be predicted and it could recur at an earlier gestational age.  We also discussed the concept of cervical insufficiency which is another cause of  birth and is classically described as painless cervical dilation.      We discussed the use of progesterone in the current pregnancy. Ms. Alonzo did receive 17-OH progesterone in her last pregnancy. Since her last pregnancy a more recent international, double-blind randomized controlled trial (PROLONG) found no reduction in the rate of recurrent  birth <35 weeks (Linh et al, 2019), calling into question the established use of 17-OHP. Given this data, the FDA has now withdrawn all formulations of 17-OHP from the market, effective immediately.     We discussed the role of cervical length surveillance in reducing the risk for recurrent  birth. We recommend monitoring cervical lengths every two weeks from 16 to 24 weeks in women with a history of PTB; an ultrasound indicated cerclage versus  vaginal progesterone supplementation should be discussed if shortening is detected prior to 24 weeks.    Recommendations:  ? Transvaginal cervical length measurements every 2 weeks until 23-24 weeks, increasing to weekly if the cervical length is between 25-29 mm.  ? Ultrasound-indicated cerclage versus vaginal progesterone should be discussed if shortening <25 mm is diagnosed <24 weeks.  ? Administration of  corticosteroids if the patient is deemed to be at increased risk of imminent  delivery.  ? Urine culture every trimester with aggressive treatment of bacteriuria.  ? Clinical evaluation of  labor symptoms.      # Chronic Hepatitis B infection with high viral load (>170,000,000 international units/ml)  Ms. Alonzo has chronic Hepatitis B infection, which she has also been followed for in previous pregnancies. This was not discussed at today's visit.    Recommendations:    Recommend referral to gastroenterology as planned for possible antiviral treatment given persistently high viral load.    Check liver function tests every trimester or as indicated (normal baseline ALT).    Avoid internal monitoring or invasive procedures intrapartum.  Avoid operative vaginal delivery if possible.     delivery reserved for routine obstetric conditions.    Hepatitis B IgG and vaccination should be provided to the  within 12 hours of delivery.   Breastfeeding is not contraindicated if the  receives both.    Violetta Bullock MD  Specialist in Maternal-Fetal Medicine    2023 , 2:32 PM      Thank you for the opportunity to participate in the care of Ms. Alonzo. Please do not hesitate to contact us if you may have any questions or concerns.       I spent 8 minutes prior to the visit preparing to see the patient (reviewing medical records and tests).  I spent 22 minutes face-face-to-face with the patient during the visit with the majority (>50%) spent on counseling and coordination of  care with the patient and/or family members.  I spent 12 minutes after the visit with the patient documenting the visit in the electronic health record and/or communicating with other health care professionals and/or care coordination.      Total time spent on today's date of service: 42 minutes.

## 2023-04-27 NOTE — NURSING NOTE
Patient reports positive fetal movement, denies pain, denies contractions, leaking of fluid, or bleeding. Patient denies headache, visual changes, nausea/vomiting, epigastric pain related to preeclampsia. [employer] ipad  used for MK visit, ID# [id number]. SBAR given to MADELEINE MD, see their note in Epic.

## 2023-05-02 ENCOUNTER — TELEPHONE (OUTPATIENT)
Dept: FAMILY MEDICINE | Facility: CLINIC | Age: 28
End: 2023-05-02

## 2023-05-02 ENCOUNTER — PRENATAL OFFICE VISIT (OUTPATIENT)
Dept: FAMILY MEDICINE | Facility: CLINIC | Age: 28
End: 2023-05-02
Payer: COMMERCIAL

## 2023-05-02 VITALS
BODY MASS INDEX: 31.49 KG/M2 | HEART RATE: 84 BPM | OXYGEN SATURATION: 98 % | WEIGHT: 150 LBS | DIASTOLIC BLOOD PRESSURE: 61 MMHG | TEMPERATURE: 97.9 F | RESPIRATION RATE: 16 BRPM | HEIGHT: 58 IN | SYSTOLIC BLOOD PRESSURE: 99 MMHG

## 2023-05-02 DIAGNOSIS — O09.90 SUPERVISION OF HIGH RISK PREGNANCY, ANTEPARTUM: Primary | ICD-10-CM

## 2023-05-02 DIAGNOSIS — L29.2 VULVAR ITCHING: ICD-10-CM

## 2023-05-02 DIAGNOSIS — O35.BXX0 ECHOGENIC FOCUS OF HEART OF FETUS AFFECTING ANTEPARTUM CARE OF MOTHER, SINGLE OR UNSPECIFIED FETUS: ICD-10-CM

## 2023-05-02 DIAGNOSIS — Z60.3 LANGUAGE BARRIER: ICD-10-CM

## 2023-05-02 DIAGNOSIS — B18.1 CHRONIC HEPATITIS B (H): ICD-10-CM

## 2023-05-02 DIAGNOSIS — Z75.8 LANGUAGE BARRIER: ICD-10-CM

## 2023-05-02 DIAGNOSIS — Z87.51 HISTORY OF PRETERM DELIVERY: ICD-10-CM

## 2023-05-02 LAB
CLUE CELLS: ABNORMAL
TRICHOMONAS, WET PREP: ABNORMAL
WBC'S/HIGH POWER FIELD, WET PREP: ABNORMAL
YEAST, WET PREP: PRESENT

## 2023-05-02 PROCEDURE — 87210 SMEAR WET MOUNT SALINE/INK: CPT | Performed by: FAMILY MEDICINE

## 2023-05-02 PROCEDURE — 99207 PR PRENATAL VISIT: CPT | Performed by: FAMILY MEDICINE

## 2023-05-02 RX ORDER — MICONAZOLE NITRATE 2 %
1 CREAM WITH APPLICATOR VAGINAL AT BEDTIME
Qty: 45 G | Refills: 0 | Status: SHIPPED | OUTPATIENT
Start: 2023-05-02 | End: 2023-05-09

## 2023-05-02 RX ORDER — DIAPER,BRIEF,INFANT-TODD,DISP
EACH MISCELLANEOUS 2 TIMES DAILY
Qty: 30 G | Refills: 0 | Status: SHIPPED | OUTPATIENT
Start: 2023-05-02 | End: 2023-08-25

## 2023-05-02 SDOH — SOCIAL STABILITY - SOCIAL INSECURITY: ACCULTURATION DIFFICULTY: Z60.3

## 2023-05-02 ASSESSMENT — PAIN SCALES - GENERAL: PAINLEVEL: NO PAIN (0)

## 2023-05-02 NOTE — PROGRESS NOTES
"Subjective: Denies headache, vision change, abnormal vaginal discharge, vaginal bleeding. Fetal movement is normal.  Reviewed ultrasound results from 23. Had vaginal ultrasound - had vaginal itching and swelling over the weekend.     Objective:  BP 99/61 (BP Location: Left arm, Patient Position: Sitting, Cuff Size: Adult Regular)   Pulse 84   Temp 97.9  F (36.6  C) (Temporal)   Resp 16   Ht 1.473 m (4' 10\")   Wt 68 kg (150 lb)   LMP 2022 (Approximate)   SpO2 98%   BMI 31.35 kg/m   Prepregnancy BMI: 29.27. Total weight gain: 4.536 kg (10 lb) Exam: see flowsheet.  Prenatal Office Visit on 2023   Component Date Value Ref Range Status     Trichomonas 2023 Absent  Absent Final     Yeast 2023 Present (A)  Absent Final     Clue Cells 2023 Absent  Absent Final     WBCs/high power field 2023 2+ (A)  None Final     Assessment/Plan:    28 year old  at 19w5d.    Due to prepregnancy BMI of 29.27, weight gain of 4.536 kg (10 lb) is appropriate for prepregnancy BMI. The following recommendations were made: reviewed weight tracking graph in Epic with patient.    Chronic hep B  - high viral load. Has GI appt next week.     History of  delivery. Plan repeat US in 2 + 4 weeks.     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     Vaginal itching/irritation - erythema on labia minora and introitus. Hydrocortisone oint twice daily for itching. Wet prep shows yeast. Rx miconazole.     Language barrier - in person  utilized today.     Future Appointments   Date Time Provider Department Center   2023  8:45 AM [facility]   2023  9:15 AM [facility]   2023  2:15 PM [facility]   2023  2:45 PM [facility]   2023  2:40 PM Lupe Snow MD [facility]   7/10/2023  1:20 PM Lupe Snow MD [facility]   2023  1:40 PM Lupe Snow MD [facility]   2023  1:20 PM Lupe Snow MD [facility]   2023  1:20 PM Lupe Snow MD [facility]   2023  2:00 PM Blanche Scruggs MD [facility]   2023  2:00 PM [facility]   2023  2:00 PM [facility]   2023  2:00 PM Blanche Scruggs MD [facility]   2023  1:00 PM Blanche Scruggs MD [facility]      Order Summary                                                      Pregnancy    Chronic hepatitis B (H)    History of  delivery    Echogenic focus of heart of fetus affecting antepartum care of mother, single or unspecified fetus    Language barrier    Vulvar itching  -     hydrocortisone (CORTAID) 1 % external ointment; Apply topically 2 times daily To vulva - where it itches.  -     Wet prep - Clinic Collect  -     miconazole (MICATIN) 2 % cream; Place 1 applicator vaginally At Bedtime for 7 days        Completed by: Blanche Scruggs M.D., [facility]. 2023 1:29 PM.   MDM: [place] neighborhood: [place] [address], language: Hmong, MDM: 15 minutes spent on the date of the encounter doing chart review, history and exam, documentation and further activities per the note.

## 2023-05-02 NOTE — LETTER
May 2, 2023      Wendy Alonzo  [address] [place] [address]        To Whom It May Concern:    Wendy Alonzo was seen in our clinic. She is pregnant. Her due date is 9/21/23.    Due to high risk pregnancy, she should have the following restrictions:  -no prolonged standing of more than 1 hour  -no repetitive reaching/bending  -allow to rest as needed  -maximum 8 hours of work per 24 hours, no more than 5 days per week.    Sincerely,        Blanche Scruggs MD

## 2023-05-02 NOTE — TELEPHONE ENCOUNTER
----- Message from Blanche Scruggs MD sent at 5/2/2023  3:54 PM CDT -----  Please let Wendy know:  She has a yeast infection.  That's probably why she has the itching.  I sent in a prescription for monistat cream. Use at bedtime every night for one week.  She can use hydrocortisone ointment, if needed, for itching. But if not needed, just use the monistat.

## 2023-05-02 NOTE — TELEPHONE ENCOUNTER
Patient returned call. Writer relay RN/provider's message below. Caller verbalizes understanding and has no further questions.

## 2023-05-10 ENCOUNTER — TRANSFERRED RECORDS (OUTPATIENT)
Dept: HEALTH INFORMATION MANAGEMENT | Facility: CLINIC | Age: 28
End: 2023-05-10
Payer: COMMERCIAL

## 2023-05-10 LAB
ALT SERPL-CCNC: 9 IU/L (ref 0–32)
AST SERPL-CCNC: 14 IU/L (ref 0–40)

## 2023-05-12 ENCOUNTER — ANCILLARY PROCEDURE (OUTPATIENT)
Dept: ULTRASOUND IMAGING | Facility: HOSPITAL | Age: 28
End: 2023-05-12
Attending: OBSTETRICS & GYNECOLOGY
Payer: COMMERCIAL

## 2023-05-12 ENCOUNTER — OFFICE VISIT (OUTPATIENT)
Dept: MATERNAL FETAL MEDICINE | Facility: HOSPITAL | Age: 28
End: 2023-05-12
Attending: OBSTETRICS & GYNECOLOGY
Payer: COMMERCIAL

## 2023-05-12 ENCOUNTER — TRANSFERRED RECORDS (OUTPATIENT)
Dept: HEALTH INFORMATION MANAGEMENT | Facility: CLINIC | Age: 28
End: 2023-05-12

## 2023-05-12 DIAGNOSIS — O09.899 HISTORY OF PRETERM DELIVERY, CURRENTLY PREGNANT: Primary | ICD-10-CM

## 2023-05-12 DIAGNOSIS — Z87.51 HISTORY OF PRETERM DELIVERY: ICD-10-CM

## 2023-05-12 PROCEDURE — 99207 PR NO CHARGE LOS: CPT | Performed by: OBSTETRICS & GYNECOLOGY

## 2023-05-12 PROCEDURE — 76817 TRANSVAGINAL US OBSTETRIC: CPT

## 2023-05-12 PROCEDURE — 76817 TRANSVAGINAL US OBSTETRIC: CPT | Mod: 26 | Performed by: OBSTETRICS & GYNECOLOGY

## 2023-05-12 NOTE — NURSING NOTE
Wendy Alonzo is a  at 21w1d who presents to [facility] for TV ultrasound. Pt reports positive fetal movement. Pt denies bldg/lof/change in discharge, contractions, headache, vision changes, chest pain/SOB or edema. SBAR given to Dr. Pan, see note in Epic.

## 2023-05-25 ENCOUNTER — OFFICE VISIT (OUTPATIENT)
Dept: MATERNAL FETAL MEDICINE | Facility: HOSPITAL | Age: 28
End: 2023-05-25
Attending: OBSTETRICS & GYNECOLOGY
Payer: COMMERCIAL

## 2023-05-25 ENCOUNTER — ANCILLARY PROCEDURE (OUTPATIENT)
Dept: ULTRASOUND IMAGING | Facility: HOSPITAL | Age: 28
End: 2023-05-25
Attending: OBSTETRICS & GYNECOLOGY
Payer: COMMERCIAL

## 2023-05-25 DIAGNOSIS — Z87.51 HISTORY OF PRETERM DELIVERY: ICD-10-CM

## 2023-05-25 DIAGNOSIS — O09.892 HISTORY OF PRETERM DELIVERY, CURRENTLY PREGNANT, SECOND TRIMESTER: Primary | ICD-10-CM

## 2023-05-25 PROCEDURE — 76817 TRANSVAGINAL US OBSTETRIC: CPT

## 2023-05-25 PROCEDURE — 99207 PR NO CHARGE LOS: CPT | Performed by: OBSTETRICS & GYNECOLOGY

## 2023-05-25 PROCEDURE — 76817 TRANSVAGINAL US OBSTETRIC: CPT | Mod: 26 | Performed by: OBSTETRICS & GYNECOLOGY

## 2023-05-25 NOTE — PROGRESS NOTES
Please see the imaging tab for details of the ultrasound performed today.    Violetta Bullock MD  Specialist in Maternal-Fetal Medicine

## 2023-06-09 ENCOUNTER — PRENATAL OFFICE VISIT (OUTPATIENT)
Dept: FAMILY MEDICINE | Facility: CLINIC | Age: 28
End: 2023-06-09
Payer: COMMERCIAL

## 2023-06-09 VITALS
HEART RATE: 70 BPM | BODY MASS INDEX: 32.32 KG/M2 | RESPIRATION RATE: 18 BRPM | WEIGHT: 154 LBS | HEIGHT: 58 IN | DIASTOLIC BLOOD PRESSURE: 62 MMHG | OXYGEN SATURATION: 99 % | SYSTOLIC BLOOD PRESSURE: 98 MMHG

## 2023-06-09 DIAGNOSIS — Z34.82 ENCOUNTER FOR SUPERVISION OF OTHER NORMAL PREGNANCY IN SECOND TRIMESTER: Primary | ICD-10-CM

## 2023-06-09 PROBLEM — Z34.92 ENCOUNTER FOR SUPERVISION OF NORMAL PREGNANCY IN SECOND TRIMESTER: Status: ACTIVE | Noted: 2023-06-09

## 2023-06-09 PROBLEM — O09.90 SUPERVISION OF HIGH RISK PREGNANCY, ANTEPARTUM: Status: RESOLVED | Noted: 2023-02-27 | Resolved: 2023-06-09

## 2023-06-09 PROCEDURE — 99207 PR PRENATAL VISIT: CPT | Performed by: FAMILY MEDICINE

## 2023-06-09 RX ORDER — TENOFOVIR DISOPROXIL FUMARATE 300 MG/1
TABLET, FILM COATED ORAL
COMMUNITY
Start: 2023-06-08 | End: 2023-07-01

## 2023-06-09 NOTE — PROGRESS NOTES
Concerns: no concerns     Working with [facility]- has viread to start in 3rd trimester     Working with MFM due to h/o 25 week delivery with previous pregnancy- thick cervix.  No concerns.     Doing well.  No concerns today.  No vaginal bleeding, no contractions, no leakage of fluid  No nausea/vomiting. No heartburn  No vaginal discharge. No dysuria.   No headache, vision changes, lower extremity swelling, upper abdominal pain, chest pain, shortness of breath  Discussed kick counts and fetal movement.  Reportable signs and symptoms discussed.  Tdap planned next visit  Discussed PTL, PROM, and when to call or come in.  Normal anatomy ultrasound.  RTC 4 weeks.  GTT and labs today       Lupe Snow MD

## 2023-06-23 ENCOUNTER — PRENATAL OFFICE VISIT (OUTPATIENT)
Dept: FAMILY MEDICINE | Facility: CLINIC | Age: 28
End: 2023-06-23
Payer: COMMERCIAL

## 2023-06-23 ENCOUNTER — TELEPHONE (OUTPATIENT)
Dept: FAMILY MEDICINE | Facility: CLINIC | Age: 28
End: 2023-06-23

## 2023-06-23 VITALS
WEIGHT: 156 LBS | HEART RATE: 79 BPM | DIASTOLIC BLOOD PRESSURE: 56 MMHG | BODY MASS INDEX: 32.75 KG/M2 | TEMPERATURE: 97.3 F | RESPIRATION RATE: 20 BRPM | OXYGEN SATURATION: 98 % | HEIGHT: 58 IN | SYSTOLIC BLOOD PRESSURE: 98 MMHG

## 2023-06-23 DIAGNOSIS — R05.2 SUBACUTE COUGH: ICD-10-CM

## 2023-06-23 DIAGNOSIS — O26.893 HEARTBURN DURING PREGNANCY IN THIRD TRIMESTER: ICD-10-CM

## 2023-06-23 DIAGNOSIS — R12 HEARTBURN DURING PREGNANCY IN THIRD TRIMESTER: ICD-10-CM

## 2023-06-23 DIAGNOSIS — B18.1 CHRONIC VIRAL HEPATITIS B WITHOUT DELTA AGENT AND WITHOUT COMA (H): ICD-10-CM

## 2023-06-23 DIAGNOSIS — Z34.82 ENCOUNTER FOR SUPERVISION OF OTHER NORMAL PREGNANCY IN SECOND TRIMESTER: Primary | ICD-10-CM

## 2023-06-23 LAB
ALT SERPL W P-5'-P-CCNC: 9 U/L (ref 0–50)
GLUCOSE 1H P 50 G GLC PO SERPL-MCNC: 144 MG/DL (ref 70–129)
HGB BLD-MCNC: 11.6 G/DL (ref 11.7–15.7)
T PALLIDUM AB SER QL: NONREACTIVE

## 2023-06-23 PROCEDURE — 86481 TB AG RESPONSE T-CELL SUSP: CPT | Performed by: FAMILY MEDICINE

## 2023-06-23 PROCEDURE — 36415 COLL VENOUS BLD VENIPUNCTURE: CPT | Performed by: FAMILY MEDICINE

## 2023-06-23 PROCEDURE — 84460 ALANINE AMINO (ALT) (SGPT): CPT | Performed by: FAMILY MEDICINE

## 2023-06-23 PROCEDURE — 90471 IMMUNIZATION ADMIN: CPT | Performed by: FAMILY MEDICINE

## 2023-06-23 PROCEDURE — 85018 HEMOGLOBIN: CPT | Performed by: FAMILY MEDICINE

## 2023-06-23 PROCEDURE — 87517 HEPATITIS B DNA QUANT: CPT | Performed by: FAMILY MEDICINE

## 2023-06-23 PROCEDURE — 90715 TDAP VACCINE 7 YRS/> IM: CPT | Performed by: FAMILY MEDICINE

## 2023-06-23 PROCEDURE — 82950 GLUCOSE TEST: CPT | Performed by: FAMILY MEDICINE

## 2023-06-23 PROCEDURE — 86780 TREPONEMA PALLIDUM: CPT | Performed by: FAMILY MEDICINE

## 2023-06-23 PROCEDURE — 99207 PR PRENATAL VISIT: CPT | Performed by: FAMILY MEDICINE

## 2023-06-23 RX ORDER — CALCIUM CARBONATE 500 MG/1
2 TABLET, CHEWABLE ORAL 2 TIMES DAILY PRN
Qty: 100 TABLET | Refills: 3 | Status: SHIPPED | OUTPATIENT
Start: 2023-06-23 | End: 2023-07-28

## 2023-06-23 ASSESSMENT — PAIN SCALES - GENERAL: PAINLEVEL: NO PAIN (0)

## 2023-06-23 NOTE — TELEPHONE ENCOUNTER
----- Message from Lupe Snow MD sent at 6/23/2023  8:34 AM CDT -----  Team - please call patient with results and send result letter with this information if patient desires for their records.     Her sugar test was too high- please help her schedule a fasting 3hr GTT next week     No anemia keep taking her pnv

## 2023-06-23 NOTE — RESULT ENCOUNTER NOTE
Team - please call patient with results and send result letter with this information if patient desires for their records.     Her sugar test was too high- please help her schedule a fasting 3hr GTT next week     No anemia keep taking her pnv

## 2023-06-23 NOTE — PROGRESS NOTES
Reviewed normal us with [facility] 5/25/23- normal cervix.  No other recommendations at this point.      1 month cough on and off- night mostly.  Not during the day.  Does not live with any TB exposure or elderly patients    Does note heartburn- worse at night. Drinking 7-up helps calm heartburn and cough.      Concerns: no concerns   Doing well.  No concerns today.  No vaginal bleeding, no contractions, no leakage of fluid  No nausea/vomiting. No heartburn  No vaginal discharge. No dysuria.   No headache, vision changes, lower extremity swelling, upper abdominal pain, chest pain, shortness of breath  Discussed kick counts and fetal movement.  Reportable signs and symptoms discussed.  Discussed PTL, PROM, and when to call or come in.  Normal anatomy ultrasound.  RTC 3 weeks.  GTT and labs today   dtap today       Lupe Snow MD

## 2023-06-24 LAB
HBV DNA SERPL NAA+PROBE-ACNC: ABNORMAL IU/ML
HBV DNA SERPL NAA+PROBE-LOG IU: 8.1 {LOG_IU}/ML

## 2023-06-25 LAB
GAMMA INTERFERON BACKGROUND BLD IA-ACNC: 0.06 IU/ML
M TB IFN-G BLD-IMP: NEGATIVE
M TB IFN-G CD4+ BCKGRND COR BLD-ACNC: 3.08 IU/ML
MITOGEN IGNF BCKGRD COR BLD-ACNC: -0.01 IU/ML
MITOGEN IGNF BCKGRD COR BLD-ACNC: 0 IU/ML
QUANTIFERON MITOGEN: 3.14 IU/ML
QUANTIFERON NIL TUBE: 0.06 IU/ML
QUANTIFERON TB1 TUBE: 0.05 IU/ML
QUANTIFERON TB2 TUBE: 0.06

## 2023-07-01 DIAGNOSIS — B18.1 CHRONIC VIRAL HEPATITIS B WITHOUT DELTA AGENT AND WITHOUT COMA (H): Primary | ICD-10-CM

## 2023-07-01 RX ORDER — TENOFOVIR DISOPROXIL FUMARATE 300 MG/1
300 TABLET, FILM COATED ORAL DAILY
Qty: 90 TABLET | Refills: 0 | Status: ON HOLD | OUTPATIENT
Start: 2023-07-01 | End: 2023-09-24

## 2023-07-01 NOTE — RESULT ENCOUNTER NOTE
Team - please call patient with results and send result letter with this information if patient desires for their records.     Her hepatitis B infection is active- she should restart the viread and take this once a day until her baby is born so that her baby doesn't get this infection from her.  I sent a refill in today.

## 2023-07-03 ENCOUNTER — LAB (OUTPATIENT)
Dept: LAB | Facility: CLINIC | Age: 28
End: 2023-07-03
Payer: COMMERCIAL

## 2023-07-03 DIAGNOSIS — Z34.82 ENCOUNTER FOR SUPERVISION OF OTHER NORMAL PREGNANCY IN SECOND TRIMESTER: ICD-10-CM

## 2023-07-03 LAB
GESTATIONAL GTT 1 HR POST DOSE: 212 MG/DL (ref 60–179)
GESTATIONAL GTT 2 HR POST DOSE: 170 MG/DL (ref 60–154)
GESTATIONAL GTT 3 HR POST DOSE: 94 MG/DL (ref 60–139)
GLUCOSE P FAST SERPL-MCNC: 83 MG/DL (ref 60–94)

## 2023-07-03 PROCEDURE — 36415 COLL VENOUS BLD VENIPUNCTURE: CPT

## 2023-07-03 PROCEDURE — 82952 GTT-ADDED SAMPLES: CPT

## 2023-07-03 PROCEDURE — 82951 GLUCOSE TOLERANCE TEST (GTT): CPT

## 2023-07-05 ENCOUNTER — TELEPHONE (OUTPATIENT)
Dept: FAMILY MEDICINE | Facility: CLINIC | Age: 28
End: 2023-07-05
Payer: COMMERCIAL

## 2023-07-05 ENCOUNTER — TELEPHONE (OUTPATIENT)
Dept: NURSING | Facility: CLINIC | Age: 28
End: 2023-07-05
Payer: COMMERCIAL

## 2023-07-05 DIAGNOSIS — O24.410 DIET CONTROLLED GESTATIONAL DIABETES MELLITUS (GDM) IN THIRD TRIMESTER: ICD-10-CM

## 2023-07-05 RX ORDER — LANCETS
EACH MISCELLANEOUS
Qty: 100 EACH | Refills: 6 | Status: SHIPPED | OUTPATIENT
Start: 2023-07-05 | End: 2023-12-06

## 2023-07-05 NOTE — TELEPHONE ENCOUNTER
Telephone call  Significant other calling back for patient went over the notes on the labs for her to start on viread for the Hepitits b. Explained  Which pharmacy it was sent to.  Consent to communicate on file in the chart.    Lexy Dangelo RN   [facility] Nurse Advisor  12:08 PM 7/5/2023

## 2023-07-05 NOTE — TELEPHONE ENCOUNTER
----- Message from Lupe Snow MD sent at 7/1/2023  4:20 PM CDT -----  Team - please call patient with results and send result letter with this information if patient desires for their records.     Her hepatitis B infection is active- she should restart the viread and take this once a day until her baby is born so that her baby doesn't get this infection from her.  I sent a refill in today.

## 2023-07-06 ENCOUNTER — APPOINTMENT (OUTPATIENT)
Dept: INTERPRETER SERVICES | Facility: CLINIC | Age: 28
End: 2023-07-06
Payer: COMMERCIAL

## 2023-07-12 ENCOUNTER — ALLIED HEALTH/NURSE VISIT (OUTPATIENT)
Dept: EDUCATION SERVICES | Facility: CLINIC | Age: 28
End: 2023-07-12
Attending: FAMILY MEDICINE
Payer: COMMERCIAL

## 2023-07-12 VITALS — WEIGHT: 156.8 LBS | BODY MASS INDEX: 32.47 KG/M2

## 2023-07-12 DIAGNOSIS — O24.410 DIET CONTROLLED GESTATIONAL DIABETES MELLITUS (GDM) IN THIRD TRIMESTER: ICD-10-CM

## 2023-07-12 PROCEDURE — G0108 DIAB MANAGE TRN  PER INDIV: HCPCS | Performed by: DIETITIAN, REGISTERED

## 2023-07-12 RX ORDER — BLOOD SUGAR DIAGNOSTIC
STRIP MISCELLANEOUS
Qty: 200 STRIP | Refills: 3 | Status: SHIPPED | OUTPATIENT
Start: 2023-07-12 | End: 2023-07-28

## 2023-07-12 RX ORDER — LANCETS 33 GAUGE
1 EACH MISCELLANEOUS 4 TIMES DAILY
Qty: 100 EACH | Refills: 4 | Status: SHIPPED | OUTPATIENT
Start: 2023-07-12 | End: 2023-07-28

## 2023-07-12 NOTE — PROGRESS NOTES
Diabetes Self-Management Education & Support/ 55 minutes with professional  Koko Rodriguez    Type of Service: In Person Visit    SUBJECTIVE/OBJECTIVE:  Presents for education related to gestational diabetes.    Accompanied by: Self,   Gestational weeks: 29w 6d  Hospital planned [facility] delivery: [facility]  Next OB Visit Date: 23  Number of previous pregnancies: 4  Had any babies over 9 lbs: No  Previously had Gestational Diabetes: No  Have you ever had thyroid problems or taken thyroid medication?: Unknown  Heart disease, mitral valve prolapse or rheumatic fever?: Unknown  Hypertension : Unknown  High Cholesterol: Unknown  High Triglycerides: Unknown  Do you use tobacco products?: No  Do you drink beer, wine or hard liquor?: No    Cultural Influences/Ethnic Background:  Not  or       Estimated Date of Delivery: Sep 21, 2023   Next OB visit: 23    1 hour OGTT  Lab Results   Component Value Date    GLU1 144 (H) 2023         3 hour OGTT    Fasting  Lab Results   Component Value Date    GTTGF 83 2023       1 hour  Lab Results   Component Value Date    GTTG1 212 (H) 2023       2 hour  Lab Results   Component Value Date    GTTG2 170 (H) 2023       3 hour  Lab Results   Component Value Date    GTTG3 94 2023       Lifestyle and Health Behaviors:  Pre-pregnancy weight (lbs): 142  Exercise:: Yes (walking/standing at work place, weekends, walking)  Barrier to exercise: None  Meal planning/habits: None  Meals include: Breakfast, Lunch, Dinner  Breakfast: 4-5am: rice, protein, vegetables  Lunch: 11-12: similiar to am meal  Dinner: 4-5P: similiar to am meal  Snacks: no snacks  Beverages: Water, Soda  Biggest challenges to healthy eating: None  Pre- vitamin?: Yes  Supplements?: Yes  Experiencing nausea?: No  Experiencing heartburn?: No (at times: had soda which helped)    Healthy Coping:  Emotional response to diabetes: Ready to learn  Informal Support  system:: Family  Stage of change: PREPARATION (Decided to change - considering how)    Current Management:  Taking medications for gestational diabetes?: No    ASSESSMENT:  Initial GDM visit for Wendy.  She is currently consuming three meals/day, with no snacks. She works early morning until mid afternoon at a position where she is standing and walking. She does try and get out on the weekends for additional walking.     INTERVENTION:  Patient was instructed on One Touch Verio Flex meter and was able to provide an accurate return demonstration. Patient's blood glucose reading today was 77 mg/dL.    Educational topics covered today:  GDM diagnosis, pathophysiology, Risks and Complications of GDM, Means of controlling GDM, Using a Blood Glucose Monitor, Blood Glucose Goals, Logging and Interpreting Glucose Results, Ketone Testing, When to Call a Diabetes Educator or OB Provider, Healthy Eating During Pregnancy, Counting Carbohydrates, Meal Planning for GDM, and Physical Activity    Educational materials provided today:   Amanda Understanding Gestational Diabetes  GDM Log Book  Sharps Disposal  Care After Delivery  One Touch Verio Flex meter kit    Pt verbalized understanding of concepts discussed and recommendations provided today.     PLAN:  Check glucose 4 times daily, before breakfast and 1 hour after each meal.     Check Ketones daily for one week, if negative, reduce testing to once a week.     Physical activity recommended: 10-15 minutes of walking after meals..    Meal plan:   Limit rice at breakfast to 1/2 of a small bowl  Limit rice at lunch and dinner to one small bowl.  Keep fruit separate from your rice or noodle meal  Have diet 7-up if you have heart burn    Call [phone] if 3 or more blood sugars are above the goal in 1 week, if ketones are positive, or with questions/concerns.    Follow up 7/28/23    Time Spent: 55 minutes  Encounter Type: Individual    Any diabetes medication dose changes were made  via the CDE Protocol and Collaborative Practice Agreement with the patient's OB/GYN provider. A copy of this encounter was shared with the provider.

## 2023-07-12 NOTE — PATIENT INSTRUCTIONS
Follow up with diabetes education for blood glucose and ketone review on July 28th.     Plan to share your glucose and ketone information with diabetes education once a week, unless otherwise directed.     1. Check glucose four times daily, before breakfast daily and 1 hour after each meal, as recommended.    2. Check ketones daily or once a week after they have been negative for 7 days in a row. If ketones are elevated, let your diabetes educator know and continue to check daily until they are negative for 7 days in a row.    3. Continue with recommended physical activity: 10-15 minutes after meals.    4. Meal Plan:  Limit rice at breakfast to 1/2 of a small bowl  Limit rice at lunch and dinner to one small bowl.  Keep fruit separate from your rice or noodle meal  Have diet 7-up if you have heart burn      5. Follow-up with OB doctor as recommended.    6. Call [phone] if 3 or more blood sugars are above the goal in 1 week or if ketones are elevated (trace or above).

## 2023-07-12 NOTE — LETTER
7/12/2023         RE: Wendy Alonzo  [address] [place] [address]        Dear Colleague,    Thank you for referring your patient, Wendy Alonzo, to the [facility]. Please see a copy of my visit note below.    Diabetes Self-Management Education & Support/ 55 minutes with professional  Koko Rodriguez    Type of Service: In Person Visit    SUBJECTIVE/OBJECTIVE:  Presents for education related to gestational diabetes.    Accompanied by: Self,   Gestational weeks: 29w 6d  Hospital planned for delivery: [facility]  Next OB Visit Date: 07/28/23  Number of previous pregnancies: 4  Had any babies over 9 lbs: No  Previously had Gestational Diabetes: No  Have you ever had thyroid problems or taken thyroid medication?: Unknown  Heart disease, mitral valve prolapse or rheumatic fever?: Unknown  Hypertension : Unknown  High Cholesterol: Unknown  High Triglycerides: Unknown  Do you use tobacco products?: No  Do you drink beer, wine or hard liquor?: No    Cultural Influences/Ethnic Background:  Not  or       Estimated Date of Delivery: Sep 21, 2023   Next OB visit: 7/28/23    1 hour OGTT  Lab Results   Component Value Date    GLU1 144 (H) 06/23/2023         3 hour OGTT    Fasting  Lab Results   Component Value Date    GTTGF 83 07/03/2023       1 hour  Lab Results   Component Value Date    GTTG1 212 (H) 07/03/2023       2 hour  Lab Results   Component Value Date    GTTG2 170 (H) 07/03/2023       3 hour  Lab Results   Component Value Date    GTTG3 94 07/03/2023       Lifestyle and Health Behaviors:  Pre-pregnancy weight (lbs): 142  Exercise:: Yes (walking/standing at work place, weekends, walking)  Barrier to exercise: None  Meal planning/habits: None  Meals include: Breakfast, Lunch, Dinner  Breakfast: 4-5am: rice, protein, vegetables  Lunch: 11-12: similiar to am meal  Dinner: 4-5P: similiar to am meal  Snacks: no snacks  Beverages: Water, Soda  Biggest challenges to  healthy eating: None  Pre- vitamin?: Yes  Supplements?: Yes  Experiencing nausea?: No  Experiencing heartburn?: No (at times: had soda which helped)    Healthy Coping:  Emotional response to diabetes: Ready to learn  Informal Support system:: Family  Stage of change: PREPARATION (Decided to change - considering how)    Current Management:  Taking medications for gestational diabetes?: No    ASSESSMENT:  Initial GDM visit for Wendy.  She is currently consuming three meals/day, with no snacks. She works early morning until mid afternoon at a position where she is standing and walking. She does try and get out on the weekends for additional walking.     INTERVENTION:  Patient was instructed on One Touch Verio Flex meter and was able to provide an accurate return demonstration. Patient's blood glucose reading today was 77 mg/dL.    Educational topics covered today:  GDM diagnosis, pathophysiology, Risks and Complications of GDM, Means of controlling GDM, Using a Blood Glucose Monitor, Blood Glucose Goals, Logging and Interpreting Glucose Results, Ketone Testing, When to Call a Diabetes Educator or OB Provider, Healthy Eating During Pregnancy, Counting Carbohydrates, Meal Planning for GDM, and Physical Activity    Educational materials provided today:   Amanda Understanding Gestational Diabetes  GDM Log Book  Sharps Disposal  Care After Delivery  One Touch Verio Flex meter kit    Pt verbalized understanding of concepts discussed and recommendations provided today.     PLAN:  Check glucose 4 times daily, before breakfast and 1 hour after each meal.     Check Ketones daily for one week, if negative, reduce testing to once a week.     Physical activity recommended: 10-15 minutes of walking after meals..    Meal plan:   Limit rice at breakfast to 1/2 of a small bowl  Limit rice at lunch and dinner to one small bowl.  Keep fruit separate from your rice or noodle meal  Have diet 7-up if you have heart burn    Call  [phone] if 3 or more blood sugars are above the goal in 1 week, if ketones are positive, or with questions/concerns.    Follow up 7/28/23    Time Spent: 55 minutes  Encounter Type: Individual    Any diabetes medication dose changes were made via the CDE Protocol and Collaborative Practice Agreement with the patient's OB/GYN provider. A copy of this encounter was shared with the provider.

## 2023-07-17 ENCOUNTER — TELEPHONE (OUTPATIENT)
Dept: EDUCATION SERVICES | Facility: CLINIC | Age: 28
End: 2023-07-17
Payer: COMMERCIAL

## 2023-07-17 NOTE — TELEPHONE ENCOUNTER
Pt picked up her One Touch Meter but did not get needles and test strips. Please send rx to [facility] in [place].   Please call [phone].

## 2023-07-18 NOTE — TELEPHONE ENCOUNTER
Called the pharmacy.     They state it is too soon to refill One Touch strips and lancets. Pt just filled #100 accu chek softclix lancets on 7/5 and #100 accu chek guide strips on 7/5 as well. Pt was given an accu chek guide meter as well.     This was switched to One Touch on 7/12 appt.       Called and spoke w/ pt. She states she does still have accu check and supplies, advised pt to use this in the mean time and the pharmacy said she can refill in 2 days. Pt verbalized understanding and does not have any other concerns

## 2023-07-28 ENCOUNTER — TRANSCRIBE ORDERS (OUTPATIENT)
Dept: MATERNAL FETAL MEDICINE | Facility: HOSPITAL | Age: 28
End: 2023-07-28

## 2023-07-28 ENCOUNTER — PRENATAL OFFICE VISIT (OUTPATIENT)
Dept: FAMILY MEDICINE | Facility: CLINIC | Age: 28
End: 2023-07-28
Payer: COMMERCIAL

## 2023-07-28 ENCOUNTER — ALLIED HEALTH/NURSE VISIT (OUTPATIENT)
Dept: EDUCATION SERVICES | Facility: CLINIC | Age: 28
End: 2023-07-28
Payer: COMMERCIAL

## 2023-07-28 VITALS
HEIGHT: 58 IN | DIASTOLIC BLOOD PRESSURE: 53 MMHG | RESPIRATION RATE: 16 BRPM | WEIGHT: 155 LBS | HEART RATE: 63 BPM | BODY MASS INDEX: 32.54 KG/M2 | SYSTOLIC BLOOD PRESSURE: 91 MMHG | OXYGEN SATURATION: 98 %

## 2023-07-28 DIAGNOSIS — O26.90 PREGNANCY RELATED CONDITION, ANTEPARTUM: Primary | ICD-10-CM

## 2023-07-28 DIAGNOSIS — O24.410 DIET CONTROLLED GESTATIONAL DIABETES MELLITUS (GDM) IN THIRD TRIMESTER: ICD-10-CM

## 2023-07-28 DIAGNOSIS — O09.93 SUPERVISION OF HIGH RISK PREGNANCY IN THIRD TRIMESTER: Primary | ICD-10-CM

## 2023-07-28 DIAGNOSIS — O24.410 DIET CONTROLLED GESTATIONAL DIABETES MELLITUS (GDM) IN THIRD TRIMESTER: Primary | ICD-10-CM

## 2023-07-28 PROBLEM — O26.893 HEARTBURN DURING PREGNANCY IN THIRD TRIMESTER: Status: RESOLVED | Noted: 2023-06-23 | Resolved: 2023-07-28

## 2023-07-28 PROBLEM — R12 HEARTBURN DURING PREGNANCY IN THIRD TRIMESTER: Status: RESOLVED | Noted: 2023-06-23 | Resolved: 2023-07-28

## 2023-07-28 PROCEDURE — 81003 URINALYSIS AUTO W/O SCOPE: CPT | Performed by: FAMILY MEDICINE

## 2023-07-28 PROCEDURE — 99207 PR PRENATAL VISIT: CPT | Performed by: FAMILY MEDICINE

## 2023-07-28 PROCEDURE — G0108 DIAB MANAGE TRN  PER INDIV: HCPCS | Mod: AE | Performed by: DIETITIAN, REGISTERED

## 2023-07-28 ASSESSMENT — PAIN SCALES - GENERAL: PAINLEVEL: NO PAIN (0)

## 2023-07-28 NOTE — PROGRESS NOTES
Diabetes Self-Management Education & Support/ 20 minutes  Type of Service: In Person Visit    SUBJECTIVE/OBJECTIVE:  Presents for education related to gestational diabetes.    Accompanied by: Self  Gestational weeks: 32w1d  Next OB Visit Date: 08/10/23  Number of previous pregnancies: 4  Had any babies over 9 lbs: No  Previously had Gestational Diabetes: No  Have you ever had thyroid problems or taken thyroid medication?: Unknown  Heart disease, mitral valve prolapse or rheumatic fever?: Unknown  Hypertension : Unknown  High Cholesterol: Unknown  High Triglycerides: Unknown  Do you use tobacco products?: No  Do you drink beer, wine or hard liquor?: No    Cultural Influences/Ethnic Background:  Not  or   Wt Readings from Last 3 Encounters:   07/28/23 70.3 kg (155 lb)   07/12/23 71.1 kg (156 lb 12.8 oz)   06/23/23 70.8 kg (156 lb)        LMP 12/08/2022 (Approximate)     Weight gain 13 lbs at 32 weeks gestation.    Estimated Date of Delivery: Sep 21, 2023  Next OB visit: 8/10/23    Blood Glucose/Ketone Log:   Date Ketones Fasting Post Breakfast Post Lunch Post Supper   7/13 5 72 135 117 88   7/14 0 81 94 91 92   7/15 0 81 97+2 98 107   7/16 0 80 149(no walking after eating) 109 97   7/17 0 81 110 113 93   7/18 0 78 139 131 103   7/19 0 86 167(rice and noodles) 121 117   7/20 0 82 133 147 102   7/21 0 85 117 96+2 117   7/22 0 89 102+2 123 101+2   7/23 0 79 77+2 127+2(more rice) 101   7/24 0 84 131 125 96   7/25 0 83 117 115+2 109   7/26 0 83 102 95 101   7/27 0 90 111 113 115       Lifestyle and Health Behaviors:  Pre-pregnancy weight (lbs): 142  Exercise:: Yes (walking/standing at work place. Wendy is walking 15-20 minutes after her meals)  Barrier to exercise: None  Meal planning/habits: None  Meals include: Breakfast, Lunch, Dinner  Breakfast: 4-5am: rice, protein, vegetables  Lunch: 11-12: similiar to am meal  Dinner: 4-5P: similiar to am meal  Snacks: no snacks  Beverages: Water, Soda  Biggest  challenges to healthy eating: None  Pre-antonietta vitamin?: Yes  Supplements?: Yes  Experiencing nausea?: No  Experiencing heartburn?: No    Healthy Coping:  Emotional response to diabetes: Ready to learn, Concern for health and well-being  Informal Support system:: Family  Stage of change: ACTION (Actively working towards change)    Current Management:  Taking medications for gestational diabetes?: No    ASSESSMENT:  Ketones: negative, trace x 1 .   Fasting blood glucoses: 100% in target.  After breakfast: 87% in target.  After lunch: 93% in target.  After dinner: 100% in target.    Wendy is walking 15-20 minutes after most of her meals, sometimes she is not able to walk after her morning meal and she notices her BG are higher.  Wendy is also aware that when she had rice and noodles for a morning meal, her BG were too high.  She is having three meals/day, sometimes a snack between meals. She is hydrating with water, she will occasionally have a few sips of  regular soda. Wendy is using the Accu check guide meter, and wanted to make sure she was able to get refills, which I will verify with her pharmacy.  She had no other questions or concerns today.     INTERVENTION:  Educational topics covered today:  What to expect after delivery, Future testing for Type 2 diabetes (2 hour OGTT at 6 week post-partum check-up and annual fasting blood glucose level), Risk of GDM and planning ahead for future pregnancies, Recommended lifestyle interventions for reducing the risk of Type 2 Diabetes, When to Call a Diabetes Educator or OB Provider    Educational Materials provided today:  Amanda Preventing Diabetes    PLAN:  Check glucose 4 times daily.  Check ketones once a week .  Continue with recommended physical activity.  Continue to follow recommended meal plan:  Limit rice at breakfast to 1/2 of a small bowl  Limit rice at lunch and dinner to one small bowl.  Keep fruit separate from your rice or noodle meal  Have diet 7-up if you  have heart burn.    Call [phone] or [employer] message diabetes educator if 3 or more blood sugars are above the goal in 1 week or if ketones are positive.    Follow up with Educator on 8/10/23      Time Spent: 20 minutes  Encounter Type: Individual    Any diabetes medication dose changes were made via the CDE Protocol and Collaborative Practice Agreement with the patient's OB/GYN provider. A copy of this encounter was shared with the provider.

## 2023-07-28 NOTE — PATIENT INSTRUCTIONS
Check glucose 4 times daily.  Check ketones once a week .  Continue with recommended physical activity.  Continue to follow recommended meal plan:  Limit rice at breakfast to 1/2 of a small bowl  Limit rice at lunch and dinner to one small bowl.  Keep fruit separate from your rice or noodle meal  Have diet 7-up if you have heart burn.    Call [phone] or [employer] message diabetes educator if 3 or more blood sugars are above the goal in 1 week or if ketones are positive.    Follow up with Educator on 8/10/23

## 2023-07-28 NOTE — PROGRESS NOTES
Louis  on the phone    Working with diabetes education- note 712/23 reviewed  Did get glucose meter and supplies and has been checking sugars  Has cut back on rice and noodles, more veggies and meats, limited sweets  Reviewed log book and glucose meter today  0 ketones  Fasting sugars 70-80s  1hr PP most .  2 readings 167 and 147 in past 10 days    No need for insulin at this point   Will plan on bpp with EFW at 36 weeks - order placed for MFM as they have previously imaged baby due to echogenic focus and hypoplastic nasal bone     Notes that if she moves 15-20 minutes after eating her sugars are fine but if she just sits and rests after eating her sugars can be too high.      Hep B with high viral load - taking viread daily     No heartburn     Concerns: none  No vaginal bleeding, LOF.  No headaches or swelling  No contractions.  Discussed kick counts and fetal movement.  Reportable signs and symptoms discussed.  Discussed kick counts and fetal movement.  Discussed PTL, PROM, and when to call or come in.  RTC 2 weeks.    Lupe Snow MD

## 2023-07-28 NOTE — LETTER
7/28/2023         RE: Wendy Alonzo  [address] [place] [address]        Dear Colleague,    Thank you for referring your patient, Wendy Alonzo, to the [facility]. Please see a copy of my visit note below.    Diabetes Self-Management Education & Support/ 20 minutes  Type of Service: In Person Visit    SUBJECTIVE/OBJECTIVE:  Presents for education related to gestational diabetes.    Accompanied by: Self  Gestational weeks: 32w1d  Next OB Visit Date: 08/10/23  Number of previous pregnancies: 4  Had any babies over 9 lbs: No  Previously had Gestational Diabetes: No  Have you ever had thyroid problems or taken thyroid medication?: Unknown  Heart disease, mitral valve prolapse or rheumatic fever?: Unknown  Hypertension : Unknown  High Cholesterol: Unknown  High Triglycerides: Unknown  Do you use tobacco products?: No  Do you drink beer, wine or hard liquor?: No    Cultural Influences/Ethnic Background:  Not  or   Wt Readings from Last 3 Encounters:   07/28/23 70.3 kg (155 lb)   07/12/23 71.1 kg (156 lb 12.8 oz)   06/23/23 70.8 kg (156 lb)        LMP 12/08/2022 (Approximate)     Weight gain 13 lbs at 32 weeks gestation.    Estimated Date of Delivery: Sep 21, 2023  Next OB visit: 8/10/23    Blood Glucose/Ketone Log:   Date Ketones Fasting Post Breakfast Post Lunch Post Supper   7/13 5 72 135 117 88   7/14 0 81 94 91 92   7/15 0 81 97+2 98 107   7/16 0 80 149(no walking after eating) 109 97   7/17 0 81 110 113 93   7/18 0 78 139 131 103   7/19 0 86 167(rice and noodles) 121 117   7/20 0 82 133 147 102   7/21 0 85 117 96+2 117   7/22 0 89 102+2 123 101+2   7/23 0 79 77+2 127+2(more rice) 101   7/24 0 84 131 125 96   7/25 0 83 117 115+2 109   7/26 0 83 102 95 101   7/27 0 90 111 113 115       Lifestyle and Health Behaviors:  Pre-pregnancy weight (lbs): 142  Exercise:: Yes (walking/standing at work place. Wendy is walking 15-20 minutes after her meals)  Barrier to exercise: None  Meal  planning/habits: None  Meals include: Breakfast, Lunch, Dinner  Breakfast: 4-5am: rice, protein, vegetables  Lunch: 11-12: similiar to am meal  Dinner: 4-5P: similiar to am meal  Snacks: no snacks  Beverages: Water, Soda  Biggest challenges to healthy eating: None  Pre-antonietta vitamin?: Yes  Supplements?: Yes  Experiencing nausea?: No  Experiencing heartburn?: No    Healthy Coping:  Emotional response to diabetes: Ready to learn, Concern for health and well-being  Informal Support system:: Family  Stage of change: ACTION (Actively working towards change)    Current Management:  Taking medications for gestational diabetes?: No    ASSESSMENT:  Ketones: negative, trace x 1 .   Fasting blood glucoses: 100% in target.  After breakfast: 87% in target.  After lunch: 93% in target.  After dinner: 100% in target.    Wendy is walking 15-20 minutes after most of her meals, sometimes she is not able to walk after her morning meal and she notices her BG are higher.  Wendy is also aware that when she had rice and noodles for a morning meal, her BG were too high.  She is having three meals/day, sometimes a snack between meals. She is hydrating with water, she will occasionally have a few sips of  regular soda. Wendy is using the Accu check guide meter, and wanted to make sure she was able to get refills, which I will verify with her pharmacy.  She had no other questions or concerns today.     INTERVENTION:  Educational topics covered today:  What to expect after delivery, Future testing for Type 2 diabetes (2 hour OGTT at 6 week post-partum check-up and annual fasting blood glucose level), Risk of GDM and planning ahead for future pregnancies, Recommended lifestyle interventions for reducing the risk of Type 2 Diabetes, When to Call a Diabetes Educator or OB Provider    Educational Materials provided today:  Amanda Preventing Diabetes    PLAN:  Check glucose 4 times daily.  Check ketones once a week .  Continue with recommended physical  activity.  Continue to follow recommended meal plan:  Limit rice at breakfast to 1/2 of a small bowl  Limit rice at lunch and dinner to one small bowl.  Keep fruit separate from your rice or noodle meal  Have diet 7-up if you have heart burn.    Call [phone] or [employer] message diabetes educator if 3 or more blood sugars are above the goal in 1 week or if ketones are positive.    Follow up with Educator on 8/10/23      Time Spent: 20 minutes  Encounter Type: Individual    Any diabetes medication dose changes were made via the CDE Protocol and Collaborative Practice Agreement with the patient's OB/GYN provider. A copy of this encounter was shared with the provider.

## 2023-07-31 ENCOUNTER — HOSPITAL ENCOUNTER (OUTPATIENT)
Dept: ULTRASOUND IMAGING | Facility: CLINIC | Age: 28
Discharge: HOME | End: 2023-07-31
Attending: OBSTETRICS & GYNECOLOGY
Payer: COMMERCIAL

## 2023-07-31 ENCOUNTER — OFFICE VISIT (OUTPATIENT)
Dept: MATERNAL FETAL MEDICINE | Facility: CLINIC | Age: 28
End: 2023-07-31
Attending: OBSTETRICS & GYNECOLOGY
Payer: COMMERCIAL

## 2023-07-31 DIAGNOSIS — O26.90 PREGNANCY RELATED CONDITION, ANTEPARTUM: ICD-10-CM

## 2023-07-31 DIAGNOSIS — O24.410 DIET CONTROLLED GESTATIONAL DIABETES MELLITUS (GDM) IN THIRD TRIMESTER: Primary | ICD-10-CM

## 2023-07-31 PROCEDURE — 76816 OB US FOLLOW-UP PER FETUS: CPT | Mod: 26 | Performed by: OBSTETRICS & GYNECOLOGY

## 2023-07-31 PROCEDURE — 76816 OB US FOLLOW-UP PER FETUS: CPT

## 2023-07-31 NOTE — NURSING NOTE
Patient reports + fetal movement, no pain, no contractions, leaking of fluid, or bleeding.  Reports blood sugar values fasting <95 and 1 hr post prandial <140.  Patient denies headache, visual changes, nausea/vomiting, epigastric pain related to preeclampsia. SBAR given to MADELEINE PURI, see their note in Epic.

## 2023-08-10 ENCOUNTER — PRENATAL OFFICE VISIT (OUTPATIENT)
Dept: FAMILY MEDICINE | Facility: CLINIC | Age: 28
End: 2023-08-10
Payer: COMMERCIAL

## 2023-08-10 ENCOUNTER — ALLIED HEALTH/NURSE VISIT (OUTPATIENT)
Dept: EDUCATION SERVICES | Facility: CLINIC | Age: 28
End: 2023-08-10
Payer: COMMERCIAL

## 2023-08-10 VITALS
HEIGHT: 58 IN | DIASTOLIC BLOOD PRESSURE: 52 MMHG | BODY MASS INDEX: 33.17 KG/M2 | HEART RATE: 81 BPM | OXYGEN SATURATION: 98 % | TEMPERATURE: 98.1 F | WEIGHT: 158 LBS | SYSTOLIC BLOOD PRESSURE: 90 MMHG | RESPIRATION RATE: 21 BRPM

## 2023-08-10 DIAGNOSIS — O09.93 SUPERVISION OF HIGH RISK PREGNANCY IN THIRD TRIMESTER: Primary | ICD-10-CM

## 2023-08-10 DIAGNOSIS — O24.410 DIET CONTROLLED GESTATIONAL DIABETES MELLITUS (GDM) IN THIRD TRIMESTER: Primary | ICD-10-CM

## 2023-08-10 PROCEDURE — 81003 URINALYSIS AUTO W/O SCOPE: CPT | Performed by: FAMILY MEDICINE

## 2023-08-10 PROCEDURE — 97803 MED NUTRITION INDIV SUBSEQ: CPT | Performed by: DIETITIAN, REGISTERED

## 2023-08-10 PROCEDURE — 99207 PR PRENATAL VISIT: CPT | Performed by: FAMILY MEDICINE

## 2023-08-10 NOTE — PROGRESS NOTES
Diabetes Self-Management Education & Support/ 15 minutes  Type of Service: In Person Visit    SUBJECTIVE/OBJECTIVE:  Presents for education related to gestational diabetes.    Accompanied by: Self, Spouse  Gestational weeks: 34w  Next OB Visit Date: 23  Number of previous pregnancies: 4  Had any babies over 9 lbs: No  Previously had Gestational Diabetes: No  Have you ever had thyroid problems or taken thyroid medication?: Unknown  Heart disease, mitral valve prolapse or rheumatic fever?: Unknown  Hypertension : Unknown  High Cholesterol: Unknown  High Triglycerides: Unknown  Do you use tobacco products?: No  Do you drink beer, wine or hard liquor?: No    Cultural Influences/Ethnic Background:  Not  or       LMP 2022 (Approximate)         Estimated Date of Delivery: Sep 21, 2023  Next OB visit: 23    Blood Glucose/Ketone Log:   Date Ketones Fasting Post Breakfast Post Lunch Post Supper    0 83 156(more rice) 131 100     81 97 131 114     89 152(more rice) 115+2 86     87 131+2(more rice) 117+2 107+2     80 117 79 112+2     81 97+2 102+2 102+2   8/3  79 98+2 104+2 105+2    0 85 82+2 110+2 123     83 96+2 122+2 97   /  79 108+2 125+2 96+2     77 100+2 112+2 113+2     80 81+2 112+2 113+2     84 91+2 98+2 98+2   8/10  82 88+2 98+2        Lifestyle and Health Behaviors:  Pre-pregnancy weight (lbs): 142  Exercise:: Yes (walking/standing at work place. Wendy is walking 15-20 minutes after her meals when she is able to)  Barrier to exercise: None  Meal planning/habits: None  Meals include: Breakfast, Lunch, Dinner  Breakfast: 4-5am: rice, protein, vegetables  Lunch: 11-12: similiar to am meal  Dinner: 4-5P: similiar to am meal  Snacks: no snacks  Beverages: Water, Soda  Biggest challenges to healthy eating: None  Pre-antonietta vitamin?: Yes  Supplements?: Yes  Experiencing nausea?: No  Experiencing heartburn?: No    Healthy Coping:  Emotional response to  diabetes: Ready to learn, Concern for health and well-being  Informal Support system:: Family  Stage of change: ACTION (Actively working towards change)    Current Management:  Taking medications for gestational diabetes?: No    ASSESSMENT:  Ketones: negative .   Fasting blood glucoses: 100% in target.  After breakfast: 79% in target.  After lunch: 86% in target.  After dinner: 100% in target.    Wendy explained elevated PP with larger portions of rice at her meals. She is consuming three meals/day, with an occasional snack. We discussed allowing snacks between meals when she is hungry which will also help with smaller portions at the next meal. She has not been walking as often after meals due to feeling muscle tightness in her abdomen, she is not restricted for exercise, so she will add in walking when she is feeling okay after meals. She will have an occasional regular soda, but mainly water for hydration. Wendy had no further questions or concerns for today's visit.     INTERVENTION:  Educational topics covered today:  What to expect after delivery, Future testing for Type 2 diabetes (2 hour OGTT at 6 week post-partum check-up and annual fasting blood glucose level), Risk of GDM and planning ahead for future pregnancies, Recommended lifestyle interventions for reducing the risk of Type 2 Diabetes, When to Call a Diabetes Educator or OB Provider    Educational Materials provided today:  Amanda Preventing Diabetes    PLAN:  Check glucose 4 times daily.  Check ketones once a week .  Continue with recommended physical activity.  Continue to follow recommended meal plan:  Limit rice at breakfast to 1/2 of a small bowl  Limit rice at lunch and dinner to one small bowl.  Keep fruit separate from your rice or noodle meal  Have diet 7-up if you have heart burn.     Call [phone] or [employer] message diabetes educator if 3 or more blood sugars are above the goal in 1 week or if ketones are positive.     Follow up with  Educator on 8/24/23    Time Spent: 15 minutes  Encounter Type: Individual    Any diabetes medication dose changes were made via the CDE Protocol and Collaborative Practice Agreement with the patient's OB/GYN provider. A copy of this encounter was shared with the provider.

## 2023-08-10 NOTE — LETTER
8/10/2023         RE: Wendy Alonzo  [address] [place] [address]        Dear Colleague,    Thank you for referring your patient, Wendy Alonzo, to the [facility]. Please see a copy of my visit note below.    Diabetes Self-Management Education & Support/ 15 minutes  Type of Service: In Person Visit    SUBJECTIVE/OBJECTIVE:  Presents for education related to gestational diabetes.    Accompanied by: Self, Spouse  Gestational weeks: 34w  Next OB Visit Date: 08/25/23  Number of previous pregnancies: 4  Had any babies over 9 lbs: No  Previously had Gestational Diabetes: No  Have you ever had thyroid problems or taken thyroid medication?: Unknown  Heart disease, mitral valve prolapse or rheumatic fever?: Unknown  Hypertension : Unknown  High Cholesterol: Unknown  High Triglycerides: Unknown  Do you use tobacco products?: No  Do you drink beer, wine or hard liquor?: No    Cultural Influences/Ethnic Background:  Not  or       LMP 12/08/2022 (Approximate)         Estimated Date of Delivery: Sep 21, 2023  Next OB visit: 8/25/23    Blood Glucose/Ketone Log:   Date Ketones Fasting Post Breakfast Post Lunch Post Supper   7/28 0 83 156(more rice) 131 100   7/29  81 97 131 114   7/30  89 152(more rice) 115+2 86   7/31  87 131+2(more rice) 117+2 107+2   8/1  80 117 79 112+2   8/2  81 97+2 102+2 102+2   8/3  79 98+2 104+2 105+2   8/4 0 85 82+2 110+2 123   8/5  83 96+2 122+2 97   8/6  79 108+2 125+2 96+2   8/7  77 100+2 112+2 113+2   8/8  80 81+2 112+2 113+2   8/9  84 91+2 98+2 98+2   8/10  82 88+2 98+2        Lifestyle and Health Behaviors:  Pre-pregnancy weight (lbs): 142  Exercise:: Yes (walking/standing at work place. Wendy is walking 15-20 minutes after her meals when she is able to)  Barrier to exercise: None  Meal planning/habits: None  Meals include: Breakfast, Lunch, Dinner  Breakfast: 4-5am: rice, protein, vegetables  Lunch: 11-12: similiar to am meal  Dinner: 4-5P: similiar to am  meal  Snacks: no snacks  Beverages: Water, Soda  Biggest challenges to healthy eating: None  Pre-antonietta vitamin?: Yes  Supplements?: Yes  Experiencing nausea?: No  Experiencing heartburn?: No    Healthy Coping:  Emotional response to diabetes: Ready to learn, Concern for health and well-being  Informal Support system:: Family  Stage of change: ACTION (Actively working towards change)    Current Management:  Taking medications for gestational diabetes?: No    ASSESSMENT:  Ketones: negative .   Fasting blood glucoses: 100% in target.  After breakfast: 79% in target.  After lunch: 86% in target.  After dinner: 100% in target.    Wendy explained elevated PP with larger portions of rice at her meals. She is consuming three meals/day, with an occasional snack. We discussed allowing snacks between meals when she is hungry which will also help with smaller portions at the next meal. She has not been walking as often after meals due to feeling muscle tightness in her abdomen, she is not restricted for exercise, so she will add in walking when she is feeling okay after meals. She will have an occasional regular soda, but mainly water for hydration. Wendy had no further questions or concerns for today's visit.     INTERVENTION:  Educational topics covered today:  What to expect after delivery, Future testing for Type 2 diabetes (2 hour OGTT at 6 week post-partum check-up and annual fasting blood glucose level), Risk of GDM and planning ahead for future pregnancies, Recommended lifestyle interventions for reducing the risk of Type 2 Diabetes, When to Call a Diabetes Educator or OB Provider    Educational Materials provided today:  Amanda Preventing Diabetes    PLAN:  Check glucose 4 times daily.  Check ketones once a week .  Continue with recommended physical activity.  Continue to follow recommended meal plan:  Limit rice at breakfast to 1/2 of a small bowl  Limit rice at lunch and dinner to one small bowl.  Keep fruit separate  from your rice or noodle meal  Have diet 7-up if you have heart burn.     Call [phone] or [employer] message diabetes educator if 3 or more blood sugars are above the goal in 1 week or if ketones are positive.     Follow up with Educator on 8/24/23    Time Spent: 15 minutes  Encounter Type: Individual    Any diabetes medication dose changes were made via the CDE Protocol and Collaborative Practice Agreement with the patient's OB/GYN provider. A copy of this encounter was shared with the provider.

## 2023-08-10 NOTE — PATIENT INSTRUCTIONS
Check glucose 4 times daily.  Check ketones once a week .  Continue with recommended physical activity.  Continue to follow recommended meal plan:  Limit rice at breakfast to 1/2 of a small bowl  Limit rice at lunch and dinner to one small bowl.  Keep fruit separate from your rice or noodle meal  Have diet 7-up if you have heart burn.     Call [phone] or [email or web address] message diabetes educator if 3 or more blood sugars are above the goal in 1 week or if ketones are positive.     Follow up with Educator on 8/24/23

## 2023-08-10 NOTE — PROGRESS NOTES
Concerns: gestational diabetes  Reviewed glucose meter- has been eating smaller portions 3x/day  Fasting sugars 70-80s.  2hr PP   1hr PP    No high sugars in past week   Does try to walk around the house 10-15min after 1 meal/day     Doing well.  No concerns today.  No vaginal bleeding, LOF.  No contractions.  Discussed kick counts and fetal movement.  Reportable signs and symptoms discussed.  Discussed kick counts and fetal movement.  Discussed PTL, PROM, and when to call or come in.  RTC 2 weeks.    Lupe nSow MD

## 2023-08-24 ENCOUNTER — VIRTUAL VISIT (OUTPATIENT)
Dept: EDUCATION SERVICES | Facility: CLINIC | Age: 28
End: 2023-08-24
Payer: COMMERCIAL

## 2023-08-24 DIAGNOSIS — O24.410 DIET CONTROLLED GESTATIONAL DIABETES MELLITUS (GDM) IN THIRD TRIMESTER: Primary | ICD-10-CM

## 2023-08-24 PROCEDURE — 98967 PH1 ASSMT&MGMT NQHP 11-20: CPT | Mod: 95 | Performed by: DIETITIAN, REGISTERED

## 2023-08-24 NOTE — PROGRESS NOTES
Diabetes and Pregnancy Follow-up 12 minutes   with professional  Jass Vasquez    Type of Service: Telephone Visit    How would patient like to obtain AVS? Not needed    Subjective/Objective:    Wendy Alonzo was called for a scheduled BG review. Last date of communication was: 8/10/23.    Gestational diabetes is being managed with diet and activity    Taking diabetes medications: no    Estimated Date of Delivery: Sep 21, 2023  Next OB visit: 9/1/23    Blood Glucose/Ketone Log:    FBS: all < 95, since 8/10/23  After breakfast: all < 140, or < 120 since 8/10/23  After lunch: all < 140, or < 120 except for 8/20 135+2(rice/two corn, no walking) since 8/10/23  After dinner: all < 140, or < 120 since 8/10/23      Assessment:    Ketones: negative.   Fasting blood glucoses: 100% in target.  After breakfast: 100% in target.  After lunch: 93% in target.  After dinner: 100% in target.    No questions or concerns from Wendy today. She is walking as able, if her stomach muscles feel too tight she will relax instead. She is satisfied with her current intake, and will have fruit as a snack when hungry.     Plan/Response:  Exercise / activity plan: activity as able  Continue to check BG 4 times daily (fasting and one or two hour(s) after each meal).  Reduce ketone checks to once a week.  Continue to follow recommended meal plan:  Limit rice at breakfast to 1/2 of a small bowl  Limit rice at lunch and dinner to one small bowl.  Keep fruit separate from your rice or noodle meal  Have diet 7-up if you have heart burn.     Call [phone] or [employer] message diabetes educator if 3 or more blood sugars are above the goal in 1 week or if ketones are positive.     Follow-up in 2 weeks:9/7/23.      Time Spent: 12 minutes    Any diabetes medication dose changes were made via the CDE Protocol and Collaborative Practice Agreement with the patient's OB/GYN provider. A copy of this encounter was shared with the provider.

## 2023-08-24 NOTE — PATIENT INSTRUCTIONS
Exercise / activity plan: activity as able  Continue to check BG 4 times daily (fasting and one or two hour(s) after each meal).  Reduce ketone checks to once a week.  Continue to follow recommended meal plan:  Limit rice at breakfast to 1/2 of a small bowl  Limit rice at lunch and dinner to one small bowl.  Keep fruit separate from your rice or noodle meal  Have diet 7-up if you have heart burn.     Call [phone] or [facility] message diabetes educator if 3 or more blood sugars are above the goal in 1 week or if ketones are positive.     Follow-up in 2 weeks:9/7/23.

## 2023-08-24 NOTE — LETTER
8/24/2023         RE: Wendy Alonzo  [address] [place] [address]        Dear Colleague,    Thank you for referring your patient, Wendy Alonzo, to the [facility]. Please see a copy of my visit note below.    Diabetes and Pregnancy Follow-up 12 minutes   with professional  Jass Vasquez    Type of Service: Telephone Visit    How would patient like to obtain AVS? Not needed    Subjective/Objective:    Wendy Alonzo was called for a scheduled BG review. Last date of communication was: 8/10/23.    Gestational diabetes is being managed with diet and activity    Taking diabetes medications: no    Estimated Date of Delivery: Sep 21, 2023  Next OB visit: 9/1/23    Blood Glucose/Ketone Log:    FBS: all < 95, since 8/10/23  After breakfast: all < 140, or < 120 since 8/10/23  After lunch: all < 140, or < 120 except for 8/20 135+2(rice/two corn, no walking) since 8/10/23  After dinner: all < 140, or < 120 since 8/10/23      Assessment:    Ketones: negative.   Fasting blood glucoses: 100% in target.  After breakfast: 100% in target.  After lunch: 93% in target.  After dinner: 100% in target.    No questions or concerns from Wendy today. She is walking as able, if her stomach muscles feel too tight she will relax instead. She is satisfied with her current intake, and will have fruit as a snack when hungry.     Plan/Response:  Exercise / activity plan: activity as able  Continue to check BG 4 times daily (fasting and one or two hour(s) after each meal).  Reduce ketone checks to once a week.  Continue to follow recommended meal plan:  Limit rice at breakfast to 1/2 of a small bowl  Limit rice at lunch and dinner to one small bowl.  Keep fruit separate from your rice or noodle meal  Have diet 7-up if you have heart burn.     Call [phone] or [employer] message diabetes educator if 3 or more blood sugars are above the goal in 1 week or if ketones are positive.     Follow-up in 2 weeks:9/7/23.      Time  Spent: 12 minutes    Any diabetes medication dose changes were made via the CDE Protocol and Collaborative Practice Agreement with the patient's OB/GYN provider. A copy of this encounter was shared with the provider.

## 2023-08-25 ENCOUNTER — PRENATAL OFFICE VISIT (OUTPATIENT)
Dept: FAMILY MEDICINE | Facility: CLINIC | Age: 28
End: 2023-08-25
Payer: COMMERCIAL

## 2023-08-25 VITALS
DIASTOLIC BLOOD PRESSURE: 56 MMHG | BODY MASS INDEX: 33.37 KG/M2 | WEIGHT: 159 LBS | SYSTOLIC BLOOD PRESSURE: 93 MMHG | HEART RATE: 78 BPM | HEIGHT: 58 IN

## 2023-08-25 DIAGNOSIS — O09.93 SUPERVISION OF HIGH RISK PREGNANCY IN THIRD TRIMESTER: ICD-10-CM

## 2023-08-25 DIAGNOSIS — O24.410 DIET CONTROLLED GESTATIONAL DIABETES MELLITUS (GDM) IN THIRD TRIMESTER: Primary | ICD-10-CM

## 2023-08-25 PROCEDURE — 81003 URINALYSIS AUTO W/O SCOPE: CPT | Performed by: FAMILY MEDICINE

## 2023-08-25 PROCEDURE — 99207 PR PRENATAL VISIT: CPT | Performed by: FAMILY MEDICINE

## 2023-08-25 PROCEDURE — 87653 STREP B DNA AMP PROBE: CPT | Performed by: FAMILY MEDICINE

## 2023-08-25 NOTE — PROGRESS NOTES
Has follow-up us scheduled with MFM 8/29/23  Has follow-up with pcp helga next week    Ongoing work with diabetes education- yesterday's note reviewed today  Doing well with diet and exercise.  No need for insulin    Reviewed log book today in person-   Fasting 70-80s  1hr PP  most 100-110    Viread daily for hep b with high viral load in 3rd trimester.      Concerns: none   .  No vaginal bleeding, LOF, contractions.  No HA, RUQ pain, N/V, visual changes.  Discussed signs of labor and when to call or come in.  Discussed kick counts and fetal movement.  Reportable signs and symptoms discussed.  GBS done today.  Labor precautions discussed.  RTC 1 week.  Prenatal flowsheet information is reviewed.    Lupe Snow MD

## 2023-08-26 LAB — GP B STREP DNA SPEC QL NAA+PROBE: POSITIVE

## 2023-08-28 PROBLEM — O99.820 GROUP B STREPTOCOCCAL CARRIAGE COMPLICATING PREGNANCY: Status: ACTIVE | Noted: 2023-08-28

## 2023-08-29 ENCOUNTER — TRANSFERRED RECORDS (OUTPATIENT)
Dept: HEALTH INFORMATION MANAGEMENT | Facility: CLINIC | Age: 28
End: 2023-08-29

## 2023-08-29 ENCOUNTER — OFFICE VISIT (OUTPATIENT)
Dept: MATERNAL FETAL MEDICINE | Facility: HOSPITAL | Age: 28
End: 2023-08-29
Attending: STUDENT IN AN ORGANIZED HEALTH CARE EDUCATION/TRAINING PROGRAM
Payer: COMMERCIAL

## 2023-08-29 ENCOUNTER — ANCILLARY PROCEDURE (OUTPATIENT)
Dept: ULTRASOUND IMAGING | Facility: HOSPITAL | Age: 28
End: 2023-08-29
Attending: STUDENT IN AN ORGANIZED HEALTH CARE EDUCATION/TRAINING PROGRAM
Payer: COMMERCIAL

## 2023-08-29 ENCOUNTER — NURSE TRIAGE (OUTPATIENT)
Dept: NURSING | Facility: CLINIC | Age: 28
End: 2023-08-29

## 2023-08-29 ENCOUNTER — HOSPITAL ENCOUNTER (OUTPATIENT)
Facility: CLINIC | Age: 28
Discharge: HOME | End: 2023-08-30
Attending: FAMILY MEDICINE | Admitting: FAMILY MEDICINE
Payer: COMMERCIAL

## 2023-08-29 VITALS
RESPIRATION RATE: 16 BRPM | SYSTOLIC BLOOD PRESSURE: 100 MMHG | WEIGHT: 160 LBS | HEART RATE: 69 BPM | TEMPERATURE: 98 F | BODY MASS INDEX: 33.58 KG/M2 | DIASTOLIC BLOOD PRESSURE: 62 MMHG | HEIGHT: 58 IN

## 2023-08-29 DIAGNOSIS — O24.410 DIET CONTROLLED GESTATIONAL DIABETES MELLITUS (GDM) IN THIRD TRIMESTER: Primary | ICD-10-CM

## 2023-08-29 DIAGNOSIS — O24.410 DIET CONTROLLED GESTATIONAL DIABETES MELLITUS (GDM) IN THIRD TRIMESTER: ICD-10-CM

## 2023-08-29 PROCEDURE — 99207 PR NO CHARGE LOS: CPT | Performed by: STUDENT IN AN ORGANIZED HEALTH CARE EDUCATION/TRAINING PROGRAM

## 2023-08-29 PROCEDURE — 76816 OB US FOLLOW-UP PER FETUS: CPT | Mod: 26 | Performed by: STUDENT IN AN ORGANIZED HEALTH CARE EDUCATION/TRAINING PROGRAM

## 2023-08-29 PROCEDURE — 76816 OB US FOLLOW-UP PER FETUS: CPT

## 2023-08-29 ASSESSMENT — ACTIVITIES OF DAILY LIVING (ADL): ADLS_ACUITY_SCORE: 31

## 2023-08-29 NOTE — PROGRESS NOTES
Please see the full imaging report from the ViewPoint program under the imaging tab.    Quynh Zimmerman MD  Maternal Fetal Medicine

## 2023-08-29 NOTE — NURSING NOTE
Wendy Alonzo is a  at 36w5d who presents to [facility] for a follow-up growth ultrasound. Pt reports positive fetal movement. Pt denies bldg/lof/change in discharge, contractions, headache, vision changes, chest pain/SOB or edema. Reports FBS 70-80, 2 hours post meals less than 120. SBAR given to Dr. Zimmerman, see note in Epic.      Leisa Díaz RN

## 2023-08-30 ENCOUNTER — HOSPITAL ENCOUNTER (OUTPATIENT)
Facility: CLINIC | Age: 28
End: 2023-08-30
Admitting: FAMILY MEDICINE
Payer: COMMERCIAL

## 2023-08-30 LAB — GLUCOSE BLDC GLUCOMTR-MCNC: 82 MG/DL (ref 70–99)

## 2023-08-30 PROCEDURE — G0463 HOSPITAL OUTPT CLINIC VISIT: HCPCS

## 2023-08-30 PROCEDURE — 82962 GLUCOSE BLOOD TEST: CPT

## 2023-08-30 NOTE — PROGRESS NOTES
[facility]: [facility]  Reason for maternal/fetal assessment is uterine contractions. Patient reports having back pain this morning [id number], then it went away for a while, but returned this afternoon and continued through the evening. Pt reports back pain is similar to the feelings she had with contractions with all of her previous labors. Patient denies leaking of vaginal fluid/rupture of membranes, vaginal bleeding, pelvic pressure, nausea, vomiting, headache, visual disturbances, epigastric or RUQ pain, significant edema. Patient reports fetal movement is normal. Patient is a 36w5d . Prenatal record reviewed. Pregnancy has been complicated by diabetes, gestational and diabetes. Support person is present.     Fetal HR baseline was 130, variability is moderate (amplitude range 6 to 25 bpm). Accelerations: increase 15 bpm above baseline lasting 15 seconds. Decelerations: absent. Uterine assessment is   during contractions and   at rest. Cervix 3 cm dilated and 30% effaced. Fetal station -3. Fetal presentation   per cervical exam. Membranes: intact.    Vital signs wnl. Patient reports pain and is coping.     Action: Verbal consent for EFM. Triage assessment completed. Patient may meet criteria for early labor discharge.     Response: Patient verbalized understanding of triage assessment. OB Resident contacted with assessment and consideration of early labor discharge vs admission.

## 2023-08-30 NOTE — TELEPHONE ENCOUNTER
OB Triage Call      Is patient's OB/Midwife with the [facility] or [facility]? [facility]- Is patient's primary OB a Midwife? No- Proceed with triage.     Reason for call: Back and abdominal pain     Assessment: Patient's spouse calling reporting she has had intermittent back and abdominal pain since 7 AM. Reports abdominal pain is moderate with intermittent feelings of needing to push. Reports the pain subsides somewhat when lying down. Patient to go to L&D for evaluation.     Plan: Go to L&D now    Patient plans to deliver at [facility]    Patient's primary OB Provider is Dr. Blanche Scruggs.    Per protocol recommendations Patient to be evaluated in L&D.  Patient plans to deliver at Delivering [facility]: [facility] ([phone]).  Labor and Deliver notified of patient's pending arrival.  Report given to N/A .      Is patient's delivering hospital on divert? Yes- If patient's hospital of choice is on divert, explain to patient their current hospital of choice is not accepting patients. Review other [facility] locations with patient. Patient's second hospital of choice Delivering Hospital: [facility] ([phone]).  L&D notified of patient's pending arrival. Report given to [place].  Route encounter to primary OB provider as FYI. OB/NICU capacity document updated.       36w5d    Estimated Date of Delivery: Sep 21, 2023        OB History    Para Term  AB Living   6 4 3 1 1 4   SAB IAB Ectopic Multiple Live Births   1 0 0 0 4      # Outcome Date GA Lbr Noah/2nd Weight Sex Delivery Anes PTL Lv   6 Current            5 SAB 20     AB, MISSED         Birth Comments: D&C   4 Term 19 38w3d 16:14 / 00:09 3.09 kg (6 lb 13 oz) F Vag-Spont None N ROXANNE      Birth Comments: Chronic hep B. Transverse lie in labor, successful ECV. . Postpartum atony (pitocin, cytotec).      Name: Janina      Apgar1: 9  Apgar5: 10   3  17 25w2d 02:14 / 00:17 0.86 kg (1 lb 14.3 oz) M Vag-Spont None Y ROXANNE     "  Birth Comments: Spontaneous  labor at 24+6 weeks. Admitted and treated with magnesium, betamethasone. See placental pathology notes.      Complications:  delivery, Premature delivery      Name: Naresh Alonzo      Apgar1: 8  Apgar5: 8   2 Term 16 39w4d 06:26 / 00:08 3.09 kg (6 lb 13 oz) M Vag-Spont None N ROXANNE      Birth Comments: High hepatitis B viral load, declined treatment. Spontaneous labor without complication.  mL      Complications: Chronic hepatitis B (H), Short interval between pregnancies affecting pregnancy in second trimester, antepartum, Chronic hepatitis B virus infection (H)      Name: Simone Ramirez      Apgar1: 8  Apgar5: 9   1 Term 11/16/15 41w1d 09:14  00:29 3.062 kg (6 lb 12 oz) F Vag-Spont None N ROXANNE      Birth Comments: Spontaneous labor.  on birthing stool. Brisk bleeding after placenta.  mL.      Complications: Chronic hepatitis B (H), Insufficient weight gain during pregnancy, Chronic hepatitis B virus infection (H)      Name: Shira Ramirez      Apgar1: 9  Apgar5: 9       No results found for: GBS       Odessa Bearden RN 23 8:53 PM  [facility] Nurse Advisor  Reason for Disposition   MODERATE-SEVERE abdominal pain    Additional Information   Negative: Passed out (i.e., lost consciousness, collapsed and was not responding)   Negative: Difficult to awaken or acting confused (e.g., disoriented, slurred speech)   Negative: Shock suspected (e.g., cold/pale/clammy skin, too weak to stand, low BP, rapid pulse)   Negative: [1] SEVERE abdominal pain (e.g., excruciating) AND [2] constant AND [3] present > 1 hour   Negative: SEVERE bleeding (e.g., continuous red blood from vagina, or large blood clots)   Negative: Umbilical cord hanging out of the vagina (shiny, white, curled appearance, \"like telephone cord\")   Negative: Uncontrollable urge to push (i.e., feels like baby is coming out now)   Negative: Can see baby   Negative: Sounds like a life-threatening " emergency to the triager    Protocols used: Pregnancy - Labor - [id number]

## 2023-08-30 NOTE — DISCHARGE INSTRUCTIONS
Discharge Instruction for Undelivered Patients      You were seen for: Labor Assessment  We Consulted: Dr Riggs  You had (Test or Medicine):fetal monitoring     Diet:   Drink 8 to 12 glasses of liquids (milk, juice, water) every day.  You may eat meals and snacks.  To manage your diabetes, follow the guidelines for eating and drinking given to you by your Clinic Provider or Diabetes Educator.       Activity:  Count fetal kicks everyday (see handout)  Call your doctor or nurse midwife if your baby is moving less than usual.     Call your provider if you notice:  Swelling in your face or increased swelling in your hands or legs.  Headaches that are not relieved by Tylenol (acetaminophen).  Changes in your vision (blurring: seeing spots or stars.)  Nausea (sick to your stomach) and vomiting (throwing up).   Weight gain of 5 pounds or more per week.  Heartburn that doesn't go away.  Signs of bladder infection: pain when you urinate (use the toilet), need to go more often and more urgently.  The bag of blake (rupture of membranes) breaks, or you notice leaking in your underwear.  Bright red blood in your underwear.  Abdominal (lower belly) or stomach pain.  For first baby: Contractions (tightening) less than 5 minutes apart for one hour or more.  Second (plus) baby: Contractions (tightening) less than 10 minutes apart and getting stronger.  *If less than 34 weeks: Contractions (tightening) more than 6 times in one hour.  Increase or change in vaginal discharge (note the color and amount)  Other:     Follow-up:  As scheduled in the clinic

## 2023-09-01 ENCOUNTER — PRENATAL OFFICE VISIT (OUTPATIENT)
Dept: FAMILY MEDICINE | Facility: CLINIC | Age: 28
End: 2023-09-01
Payer: COMMERCIAL

## 2023-09-01 VITALS
BODY MASS INDEX: 33.02 KG/M2 | WEIGHT: 158 LBS | SYSTOLIC BLOOD PRESSURE: 104 MMHG | OXYGEN SATURATION: 98 % | HEART RATE: 76 BPM | DIASTOLIC BLOOD PRESSURE: 57 MMHG

## 2023-09-01 DIAGNOSIS — B18.1 CHRONIC VIRAL HEPATITIS B WITHOUT DELTA AGENT AND WITHOUT COMA (H): ICD-10-CM

## 2023-09-01 DIAGNOSIS — O09.93 SUPERVISION OF HIGH RISK PREGNANCY IN THIRD TRIMESTER: Primary | ICD-10-CM

## 2023-09-01 DIAGNOSIS — O09.90 SUPERVISION OF HIGH RISK PREGNANCY, ANTEPARTUM: ICD-10-CM

## 2023-09-01 PROCEDURE — 99207 PR PRENATAL VISIT: CPT | Performed by: FAMILY MEDICINE

## 2023-09-01 PROCEDURE — 81003 URINALYSIS AUTO W/O SCOPE: CPT | Performed by: FAMILY MEDICINE

## 2023-09-01 ASSESSMENT — PAIN SCALES - GENERAL: PAINLEVEL: MILD PAIN (2)

## 2023-09-01 NOTE — LETTER
September 1, 2023      Wendy Alonzo  [address] [place] [address]        To Whom It May Concern:    Wendy Alonzo  was seen on 11:05 AM.  Please excuse her from 9/5/2023 until after her maternity leave due to late pregnancy.      Sincerely,        Blanche Scruggs MD

## 2023-09-01 NOTE — PATIENT INSTRUCTIONS
"Week 37 of Your Pregnancy: Care Instructions    Most babies are born between 37 and 40 weeks.   This is a good time to pack a bag to take with you to the birth. Then it will be ready to go when you are.     Learn about breastfeeding.  For example, find out about ways to hold your baby to make breastfeeding easier. And think about learning how to pump and store milk.     Know that crying is normal.  It's common for babies to cry 1 to 3 hours a day. Some cry more, and some cry less.     Learn why babies cry.  They may be hungry; have gas; need a diaper change; or feel cold, warm, tired, lonely, or tense. Sometimes they cry for unknown reasons.     Think about what will help you stay calm when your baby cries.  Taking slow, deep breaths can help. So can taking a break. It's okay to put your baby somewhere safe (like their crib) and walk away for a few minutes.     Learn about safe sleep for your baby.  Always put your baby to sleep on their back. Place them alone in a crib or bassinet with a firm, flat surface. Keep soft items like stuffed animals out of the crib.     Learn what to expect with  poop.  Your baby will have their own bowel patterns. Some babies have several bowel movements a day. Some have fewer.     Know that  babies will often have loose, yellow bowel movements.  Formula-fed babies have more formed stools. If your baby's poop looks like pellets, your baby is constipated.   Follow-up care is a key part of your treatment and safety. Be sure to make and go to all appointments, and call your doctor if you are having problems. It's also a good idea to know your test results and keep a list of the medicines you take.  Where can you learn more?  Go to https://www."".net/patiented  Enter N257 in the search box to learn more about \"Week 37 of Your Pregnancy: Care Instructions.\"  Current as of: 2022               Content Version: 13.7    2105-6255 [employer]. "   Care instructions adapted under license by your healthcare professional. If you have questions about a medical condition or this instruction, always ask your healthcare professional. [employer] disclaims any warranty or liability for your use of this information.

## 2023-09-01 NOTE — PROGRESS NOTES
Subjective:  Denies headache, vision change, abnormal vaginal discharge, vaginal bleeding. Fetal movement is normal.  Contractions with activity. Cause pressure and back pain.  Can't work. Too much pain and pressure to work. Wants to begin maternity leave.   Declines cervix check    Objective:  LMP 2022 (Approximate)  Prepregnancy BMI: 29.27. Total weight gain: 9.072 kg (20 lb) Exam: see flowsheet.  No visits with results within 1 Day(s) from this visit.   Latest known visit with results is:   Admission on 2023, Discharged on 2023   Component Date Value Ref Range Status    GLUCOSE BY METER POCT 2023 82  70 - 99 mg/dL Final      Assessment/Plan:  28 year old  at 37w1d.  Due to prepregnancy BMI of 29.27, weight gain of 9.072 kg (20 lb) is appropriate for prepregnancy BMI. The following recommendations were made: reviewed weight tracking graph in Epic with patient.  Gestational diabetes.  Diet controlled.  Well-controlled.  Plan delivery by 41 weeks.  Chronic hepatitis B, high viral load.  On antiviral therapy with evaluated.  Check LFTs and HBV DNA today.  Group B strep positive.  Aware that she needs intrapartum antibiotics.  History of  delivery.  Now at early term.  Hmong language.  A telephone  was used today.  We will begin maternity leave on 2023.  Unable to work at this time due to pressure and pain from pregnancy.  Letter written.    Future Appointments   Date Time Provider Department Center   2023 11:00 AM Blanche Scruggs MD [facility]   2023  4:00 PM Jojo Nieto RD [facility]   2023 12:00 PM Snehal Browning MD [facility]   2023  9:20 AM Blanche Scruggs MD [facility]   2023 11:20 AM Blanche Scruggs MD [facility]   2023 11:20 AM [facility]      Order Summary                                                      Supervision of high risk pregnancy in third trimester    Chronic  viral hepatitis B without delta agent and without coma (H)        Completed by: Blanche Scruggs M.D., [facility]. 9/1/2023 7:57 AM.  MDM: [place] neighborhood: [place] [address], language: Hmong, MDM: 15 minutes spent on the date of the encounter doing chart review, history and exam, documentation and further activities per the note.

## 2023-09-07 ENCOUNTER — PRENATAL OFFICE VISIT (OUTPATIENT)
Dept: FAMILY MEDICINE | Facility: CLINIC | Age: 28
End: 2023-09-07
Payer: COMMERCIAL

## 2023-09-07 ENCOUNTER — VIRTUAL VISIT (OUTPATIENT)
Dept: EDUCATION SERVICES | Facility: CLINIC | Age: 28
End: 2023-09-07
Payer: COMMERCIAL

## 2023-09-07 VITALS
BODY MASS INDEX: 33.17 KG/M2 | WEIGHT: 158 LBS | SYSTOLIC BLOOD PRESSURE: 98 MMHG | HEIGHT: 58 IN | HEART RATE: 86 BPM | OXYGEN SATURATION: 100 % | TEMPERATURE: 97.1 F | RESPIRATION RATE: 16 BRPM | DIASTOLIC BLOOD PRESSURE: 59 MMHG

## 2023-09-07 DIAGNOSIS — O09.93 SUPERVISION OF HIGH RISK PREGNANCY IN THIRD TRIMESTER: Primary | ICD-10-CM

## 2023-09-07 DIAGNOSIS — O24.410 DIET CONTROLLED GESTATIONAL DIABETES MELLITUS (GDM) IN THIRD TRIMESTER: Primary | ICD-10-CM

## 2023-09-07 DIAGNOSIS — O09.90 SUPERVISION OF HIGH RISK PREGNANCY, ANTEPARTUM: ICD-10-CM

## 2023-09-07 PROCEDURE — 81003 URINALYSIS AUTO W/O SCOPE: CPT | Performed by: FAMILY MEDICINE

## 2023-09-07 PROCEDURE — 90471 IMMUNIZATION ADMIN: CPT | Performed by: FAMILY MEDICINE

## 2023-09-07 PROCEDURE — 99207 PR PRENATAL VISIT: CPT | Performed by: FAMILY MEDICINE

## 2023-09-07 PROCEDURE — 98967 PH1 ASSMT&MGMT NQHP 11-20: CPT | Mod: 95 | Performed by: DIETITIAN, REGISTERED

## 2023-09-07 PROCEDURE — 90686 IIV4 VACC NO PRSV 0.5 ML IM: CPT | Performed by: FAMILY MEDICINE

## 2023-09-07 ASSESSMENT — PAIN SCALES - GENERAL: PAINLEVEL: NO PAIN (0)

## 2023-09-07 NOTE — PROGRESS NOTES
Diabetes and Pregnancy Follow-up through professional  Sarah   Type of Service: Telephone Visit    How would patient like to obtain AVS? Not needed    Subjective/Objective:    Wendy Alonzo was called for a scheduled BG review. Last date of communication was: 8/24/23.    Gestational diabetes is being managed with diet and activity    Taking diabetes medications: no    Estimated Date of Delivery: Sep 21, 2023  Next OB visit: 9/14/23    Blood Glucose Log 8/25-9/7:  FBS: all BG< 95  After breakfast: all BG < 140 at 1 hour, or < 120 at 2 hours  After lunch: all BG< 140 at 1 hours or < 120 at 2 hours except: 8/30+2, 9/2=133+2, 9/6=147  After dinner: all BG < 140 at 1 hour or < 120 at 2 hours except: 9/4=127+2, 9/6=147, 9/7=153  Elevated PP explained by intake of bubble tea or corn or sticky rice          Assessment:  Wendy has been craving foods that she will not be able to have for one month after delivery: corn, sticky rice and bubble tea. She has indulged a few times with these food items which explains her elevated PP. We discussed having smaller portions of these desired foods/drinks and walking 10-15 minutes after meals to aid in lowering BG readings.  Wendy had no other questions or concerns today.  We will not schedule a follow up after today's visit as she is close to her deliver date.  Wendy has our Diabetes team # to call for questions or concerns.       Ketones: negative.   Fasting blood glucoses: 100% in target.  After breakfast: 100% in target.  After lunch: 79% in target.  After dinner: 79% in target.    Plan/Response:  Exercise / activity plan: 10-15 minutes of walking after meals, especially when you have had sticky rice, corn or  bubble tea.    Continue to check BG 4 times daily (fasting and one or two hour(s) after each meal).    Reduce ketone checks to once a week.    Continue to follow recommended meal plan:  Limit rice at breakfast to 1/2 of a small bowl  Limit rice at lunch and dinner to one  small bowl.  Keep fruit separate from your rice or noodle meal  Have diet 7-up if you have heart burn.  Have smaller portions of sticky rice and corn.  Limit bubble tea to 1/2 of a serving.      Call [phone] or [employer] message diabetes educator if you have questions or concerns.           Time Spent: 20 minutes    Any diabetes medication dose changes were made via the CDE Protocol and Collaborative Practice Agreement with the patient's OB/GYN provider. A copy of this encounter was shared with the provider.

## 2023-09-07 NOTE — PROGRESS NOTES
Future Appointments   Date Time Provider Department Center   9/14/2023  9:20 AM [facility]   9/22/2023 11:20 AM [facility]   9/29/2023 11:20 AM Blanche Scruggs MD [facility]

## 2023-09-07 NOTE — PATIENT INSTRUCTIONS
Exercise / activity plan: 10-15 minutes of walking after meals, especially when you have had sticky rice, corn or  bubble tea.    Continue to check BG 4 times daily (fasting and one or two hour(s) after each meal).    Reduce ketone checks to once a week.    Continue to follow recommended meal plan:  Limit rice at breakfast to 1/2 of a small bowl  Limit rice at lunch and dinner to one small bowl.  Keep fruit separate from your rice or noodle meal  Have diet 7-up if you have heart burn.  Have smaller portions of sticky rice and corn.  Limit bubble tea to 1/2 of a serving.      Call [phone] or [employer] message diabetes educator if you have questions or concerns.

## 2023-09-07 NOTE — PROGRESS NOTES
"Subjective:  Denies headache, vision change, abnormal vaginal discharge, vaginal bleeding. Fetal movement is normal.  Blood sugar are at goal unless eats a lot.     Objective:  BP 98/59 (BP Location: Left arm, Patient Position: Sitting, Cuff Size: Adult Regular)   Pulse 86   Temp 97.1  F (36.2  C) (Temporal)   Resp 16   Ht 1.47 m (4' 9.87\")   Wt 71.7 kg (158 lb)   LMP 2022 (Approximate)   SpO2 100%   BMI 33.17 kg/m   Prepregnancy BMI: 29.39. Total weight gain: 8.165 kg (18 lb) Exam: see flowsheet.  No visits with results within 1 Day(s) from this visit.   Latest known visit with results is:   Prenatal Office Visit on 2023   Component Date Value Ref Range Status    Glucose Urine 2023 Negative  Negative mg/dL Final    Ketones Urine 2023 Negative  Negative mg/dL Final    Protein Albumin Urine 2023 Negative  Negative mg/dL Final      Assessment/Plan:  28 year old  at 38w0d.  Due to prepregnancy BMI of 29.39, weight gain of 8.165 kg (18 lb) is appropriate for prepregnancy BMI. The following recommendations were made: reviewed weight tracking graph in Epic with patient.  GDM - diet controlled, mostly well-controlled. Continue monitoring per CDE.  Hep B on meds. Reviewed labs.    Future Appointments   Date Time Provider Department Center   2023  4:00 PM oJjo Nieto RD [facility]   2023  9:20 AM Blanche Scruggs MD [facility]   2023 11:20 AM Blanche Scruggs MD [facility]   2023 11:20 [facility]      Order Summary                                                      Supervision of high risk pregnancy in third trimester        Completed by: Blanche Scruggs M.D., [facility]. 2023 1:01 PM.  MDM: SDOH neighborhood: [place] [address], language: Hmong, MDM: 15 minutes spent on the date of the encounter doing chart review, history and exam, documentation and further activities per the note.  "

## 2023-09-07 NOTE — LETTER
9/7/2023         RE: Wendy Alonzo  [address] [place] [address]        Dear Colleague,    Thank you for referring your patient, Wendy Alonzo, to the [facility]. Please see a copy of my visit note below.    Diabetes and Pregnancy Follow-up through professional  Sarah   Type of Service: Telephone Visit    How would patient like to obtain AVS? Not needed    Subjective/Objective:    Wendy Alonzo was called for a scheduled BG review. Last date of communication was: 8/24/23.    Gestational diabetes is being managed with diet and activity    Taking diabetes medications: no    Estimated Date of Delivery: Sep 21, 2023  Next OB visit: 9/14/23    Blood Glucose Log 8/25-9/7:  FBS: all BG< 95  After breakfast: all BG < 140 at 1 hour, or < 120 at 2 hours  After lunch: all BG< 140 at 1 hours or < 120 at 2 hours except: 8/3095=478+2, 9/2=133+2, 9/6=147  After dinner: all BG < 140 at 1 hour or < 120 at 2 hours except: 9/4=127+2, 9/6=147, 9/7=153  Elevated PP explained by intake of bubble tea or corn or sticky rice          Assessment:  Wendy has been craving foods that she will not be able to have for one month after delivery: corn, sticky rice and bubble tea. She has indulged a few times with these food items which explains her elevated PP. We discussed having smaller portions of these desired foods/drinks and walking 10-15 minutes after meals to aid in lowering BG readings.  Wendy had no other questions or concerns today.  We will not schedule a follow up after today's visit as she is close to her deliver date.  Wendy has our Diabetes team # to call for questions or concerns.       Ketones: negative.   Fasting blood glucoses: 100% in target.  After breakfast: 100% in target.  After lunch: 79% in target.  After dinner: 79% in target.    Plan/Response:  Exercise / activity plan: 10-15 minutes of walking after meals, especially when you have had sticky rice, corn or  bubble tea.    Continue to check BG  4 times daily (fasting and one or two hour(s) after each meal).    Reduce ketone checks to once a week.    Continue to follow recommended meal plan:  Limit rice at breakfast to 1/2 of a small bowl  Limit rice at lunch and dinner to one small bowl.  Keep fruit separate from your rice or noodle meal  Have diet 7-up if you have heart burn.  Have smaller portions of sticky rice and corn.  Limit bubble tea to 1/2 of a serving.      Call [phone] or [employer] diabetes educator if you have questions or concerns.           Time Spent: 20 minutes    Any diabetes medication dose changes were made via the CDE Protocol and Collaborative Practice Agreement with the patient's OB/GYN provider. A copy of this encounter was shared with the provider.

## 2023-09-14 ENCOUNTER — PRENATAL OFFICE VISIT (OUTPATIENT)
Dept: FAMILY MEDICINE | Facility: CLINIC | Age: 28
End: 2023-09-14
Payer: COMMERCIAL

## 2023-09-14 ENCOUNTER — TRANSCRIBE ORDERS (OUTPATIENT)
Dept: MATERNAL FETAL MEDICINE | Facility: HOSPITAL | Age: 28
End: 2023-09-14

## 2023-09-14 VITALS
TEMPERATURE: 98.1 F | BODY MASS INDEX: 33.37 KG/M2 | OXYGEN SATURATION: 99 % | SYSTOLIC BLOOD PRESSURE: 98 MMHG | WEIGHT: 159 LBS | RESPIRATION RATE: 16 BRPM | HEART RATE: 62 BPM | DIASTOLIC BLOOD PRESSURE: 50 MMHG | HEIGHT: 58 IN

## 2023-09-14 DIAGNOSIS — O09.90 SUPERVISION OF HIGH RISK PREGNANCY, ANTEPARTUM: ICD-10-CM

## 2023-09-14 DIAGNOSIS — O09.93 SUPERVISION OF HIGH RISK PREGNANCY IN THIRD TRIMESTER: Primary | ICD-10-CM

## 2023-09-14 DIAGNOSIS — O26.90 PREGNANCY RELATED CONDITION, ANTEPARTUM: Primary | ICD-10-CM

## 2023-09-14 DIAGNOSIS — B18.1 CHRONIC VIRAL HEPATITIS B WITHOUT DELTA AGENT AND WITHOUT COMA (H): ICD-10-CM

## 2023-09-14 DIAGNOSIS — O24.410 DIET CONTROLLED GESTATIONAL DIABETES MELLITUS (GDM) IN THIRD TRIMESTER: ICD-10-CM

## 2023-09-14 PROCEDURE — 99207 PR PRENATAL VISIT: CPT | Performed by: FAMILY MEDICINE

## 2023-09-14 PROCEDURE — 81003 URINALYSIS AUTO W/O SCOPE: CPT | Performed by: FAMILY MEDICINE

## 2023-09-14 ASSESSMENT — EDINBURGH POSTNATAL DEPRESSION SCALE (EPDS)
TOTAL SCORE: 3
I HAVE BEEN ABLE TO LAUGH AND SEE THE FUNNY SIDE OF THINGS: NOT AT ALL
I HAVE BEEN ANXIOUS OR WORRIED FOR NO GOOD REASON: NO, NOT AT ALL
I HAVE LOOKED FORWARD WITH ENJOYMENT TO THINGS: AS MUCH AS I EVER DID
I HAVE FELT SAD OR MISERABLE: NO, NOT AT ALL
I HAVE FELT SCARED OR PANICKY FOR NO GOOD REASON: NO, NOT AT ALL
I HAVE BEEN SO UNHAPPY THAT I HAVE HAD DIFFICULTY SLEEPING: NOT AT ALL
THE THOUGHT OF HARMING MYSELF HAS OCCURRED TO ME: NEVER
I HAVE BEEN SO UNHAPPY THAT I HAVE BEEN CRYING: NO, NEVER
I HAVE BLAMED MYSELF UNNECESSARILY WHEN THINGS WENT WRONG: NO, NEVER
THINGS HAVE BEEN GETTING ON TOP OF ME: NO, I HAVE BEEN COPING AS WELL AS EVER

## 2023-09-14 ASSESSMENT — PAIN SCALES - GENERAL: PAINLEVEL: NO PAIN (0)

## 2023-09-14 NOTE — PROGRESS NOTES
"Subjective:  Denies headache, vision change, abnormal vaginal discharge, vaginal bleeding. Fetal movement is normal.  Reported blood sugars are almost entirely in range.   23: AC 48%, EFW 26%  Objective:  BP 98/50 (BP Location: Left arm, Patient Position: Sitting, Cuff Size: Adult Regular)   Pulse 62   Temp 98.1  F (36.7  C) (Temporal)   Resp 16   Ht 1.47 m (4' 9.87\")   Wt 72.1 kg (159 lb)   LMP 2022 (Approximate)   SpO2 99%   BMI 33.38 kg/m   Prepregnancy BMI: 29.39. Total weight gain: 8.618 kg (19 lb) Exam: see flowsheet.  Prenatal Office Visit on 2023   Component Date Value Ref Range Status    Glucose Urine 2023 Negative  Negative mg/dL Final    Ketones Urine 2023 Negative  Negative mg/dL Final    Protein Albumin Urine 2023 Negative  Negative mg/dL Final      Assessment/Plan:  28 year old  at 39w0d.  Due to prepregnancy BMI of 29.39, weight gain of 8.618 kg (19 lb) is appropriate for prepregnancy BMI. The following recommendations were made: reviewed weight tracking graph in Epic with patient.  Diet controlled, well controlled GDM. Continue current monitoring. Check US for growth, DC. Pt and  wish to defer induction at this time. Discussed recommendation for induction prior to 41 weeks.     Future Appointments   Date Time Provider Department Center   2023 11:20 AM Blanche Scruggs MD [facility]   2023 11:20 AM Blanche Scruggs MD [facility]      Order Summary                                                      Supervision of high risk pregnancy in third trimester  -     Hemoglobin    Supervision of high risk pregnancy, antepartum  -     Urinalysis, OB Screen    Chronic viral hepatitis B without delta agent and without coma (H)  -     Hep B Virus DNA Quant Real Time PCR  -     Hepatic function panel    Diet controlled gestational diabetes mellitus (GDM) in third trimester  -     Mat Fetal Med Ctr Referral - Pregnancy; Future      "   Completed by: Blanche Scruggs M.D., [facility]. 9/14/2023 9:21 AM.  MDM: IVY neighborhood: [place] [address], language: Hmong, MDM: 15 minutes spent on the date of the encounter doing chart review, history and exam, documentation and further activities per the note.

## 2023-09-14 NOTE — PATIENT INSTRUCTIONS
"Week 39 of Your Pregnancy: Care Instructions     babies can look different than what you see in pictures or movies. Their heads can be a strange shape right after birth. And they may have swollen eyes and red marks on their faces.   You can still get pregnant even if you are breastfeeding. If you don't want to get pregnant, talk to your doctor about birth control.   Tips for week 39 of pregnancy  If you plan to breastfeed, get prepared.     Continue to eat healthy foods.  Keep taking your prenatal vitamins during breastfeeding if your doctor recommends it.  Talk to your doctor before taking any medicines or supplements.  Choose the right birth control for you.     Intrauterine devices (IUDs) are placed in the uterus. Sometimes the IUD can be placed right after giving birth. They work for years.  Hormonal implants are placed under the skin of the arm. They also work for years.  Depo-Provera is a shot. You get it every 3 months.  Birth control pills can be used. They're taken every day.  Tubal ligation (tying your tubes) and vasectomy are surgeries. They're permanent.  Diaphragms, spermicide, and condoms must be used each time you have sex. If you used a diaphragm before, you should get refitted after the baby is born.  A birth control patch or ring can be used. These just can't be started until several weeks after you give birth.  Follow-up care is a key part of your treatment and safety. Be sure to make and go to all appointments, and call your doctor if you are having problems. It's also a good idea to know your test results and keep a list of the medicines you take.  Where can you learn more?  Go to https://www.[employer].net/patiented  Enter A811 in the search box to learn more about \"Week 39 of Your Pregnancy: Care Instructions.\"  Current as of: 2022               Content Version: 13.7    5733-8046 [employer].   Care instructions adapted under license by your healthcare " professional. If you have questions about a medical condition or this instruction, always ask your healthcare professional. Healthwise[employer] Inc[employer] disclaims any warranty or liability for your use of this information.

## 2023-09-20 ENCOUNTER — TRANSFERRED RECORDS (OUTPATIENT)
Dept: HEALTH INFORMATION MANAGEMENT | Facility: CLINIC | Age: 28
End: 2023-09-20

## 2023-09-20 ENCOUNTER — OFFICE VISIT (OUTPATIENT)
Dept: MATERNAL FETAL MEDICINE | Facility: HOSPITAL | Age: 28
End: 2023-09-20
Attending: FAMILY MEDICINE
Payer: COMMERCIAL

## 2023-09-20 ENCOUNTER — ANCILLARY PROCEDURE (OUTPATIENT)
Dept: ULTRASOUND IMAGING | Facility: HOSPITAL | Age: 28
End: 2023-09-20
Attending: FAMILY MEDICINE
Payer: COMMERCIAL

## 2023-09-20 DIAGNOSIS — O40.3XX0 POLYHYDRAMNIOS AFFECTING PREGNANCY IN THIRD TRIMESTER: ICD-10-CM

## 2023-09-20 DIAGNOSIS — O26.90 PREGNANCY RELATED CONDITION, ANTEPARTUM: ICD-10-CM

## 2023-09-20 DIAGNOSIS — O24.410 DIET CONTROLLED GESTATIONAL DIABETES MELLITUS (GDM) IN THIRD TRIMESTER: Primary | ICD-10-CM

## 2023-09-20 PROCEDURE — 76816 OB US FOLLOW-UP PER FETUS: CPT

## 2023-09-20 PROCEDURE — 99213 OFFICE O/P EST LOW 20 MIN: CPT | Mod: 25 | Performed by: OBSTETRICS & GYNECOLOGY

## 2023-09-20 PROCEDURE — 76816 OB US FOLLOW-UP PER FETUS: CPT | Mod: 26 | Performed by: OBSTETRICS & GYNECOLOGY

## 2023-09-20 NOTE — NURSING NOTE
Patient reports positive fetal movement, denies pain, denies contractions, leaking of fluid, or bleeding.  Reports blood sugar values within normal limits. SBAR given to MADELEINE PURI, see their note in Epic.

## 2023-09-20 NOTE — PROGRESS NOTES
The patient was seen for an ultrasound in the Maternal-Fetal Medicine Center at the [facility] today.  For a detailed report of the ultrasound examination, please see the ultrasound report which can be found under the imaging tab.    If you have questions regarding today's evaluation or if we can be of further service, please contact the Maternal-Fetal Medicine Center.    Nadya Ely MD  , OB/GYN  Maternal-Fetal Medicine  [phone] (Pager)

## 2023-09-22 ENCOUNTER — PRENATAL OFFICE VISIT (OUTPATIENT)
Dept: FAMILY MEDICINE | Facility: CLINIC | Age: 28
End: 2023-09-22
Payer: COMMERCIAL

## 2023-09-22 VITALS
WEIGHT: 157 LBS | SYSTOLIC BLOOD PRESSURE: 93 MMHG | TEMPERATURE: 97.2 F | OXYGEN SATURATION: 100 % | BODY MASS INDEX: 32.95 KG/M2 | RESPIRATION RATE: 16 BRPM | DIASTOLIC BLOOD PRESSURE: 53 MMHG | HEIGHT: 58 IN | HEART RATE: 69 BPM

## 2023-09-22 DIAGNOSIS — O09.93 SUPERVISION OF HIGH RISK PREGNANCY IN THIRD TRIMESTER: Primary | ICD-10-CM

## 2023-09-22 PROCEDURE — 99207 PR PRENATAL VISIT: CPT | Performed by: FAMILY MEDICINE

## 2023-09-22 ASSESSMENT — PAIN SCALES - GENERAL: PAINLEVEL: NO PAIN (0)

## 2023-09-22 NOTE — PROGRESS NOTES
"Subjective:  Denies headache, vision change, vaginal bleeding. Fetal movement is normal.  More vaginal discharge starting 23. Sticky white/clear.   Hoping to deliver without induction, but OK with induction at 40+6, as advised    Objective:  BP 93/53 (BP Location: Left arm, Patient Position: Sitting, Cuff Size: Adult Regular)   Pulse 69   Temp 97.2  F (36.2  C) (Temporal)   Resp 16   Ht 1.47 m (4' 9.87\")   Wt 71.2 kg (157 lb)   LMP 2022 (Approximate)   SpO2 100%   BMI 32.96 kg/m   Prepregnancy BMI: 29.39. Total weight gain: 7.711 kg (17 lb) Exam: see flowsheet.  No visits with results within 1 Day(s) from this visit.   Latest known visit with results is:   Prenatal Office Visit on 2023   Component Date Value Ref Range Status    Glucose Urine 2023 Negative  Negative mg/dL Final    Ketones Urine 2023 Negative  Negative mg/dL Final    Protein Albumin Urine 2023 Negative  Negative mg/dL Final      Assessment/Plan:  28 year old  at 40w1d.  Due to prepregnancy BMI of 29.39, weight gain of 7.711 kg (17 lb) is appropriate for prepregnancy BMI. The following recommendations were made: reviewed weight tracking graph in Epic with patient.  GDM. Diet-controlled, well controlled. EFW 20%, AC 28%. Mild polyhydramnios. Scheduled for induction at 40w6d. Membranes swept.  Hep B, on antivirals  GBS+    Future Appointments   Date Time Provider Department Center   2023 11:20 AM Blanche Scruggs MD [facility]   2023  9:30 [facility]   2023 10:00 [facility]   2023 11:20 AM [facility]      Order Summary                                                      There are no diagnoses linked to this encounter.    Completed by: Blanche Scruggs M.D., [facility]. 2023 11:13 AM.  MDM: SDOH neighborhood: [place] [address], language: Hmong, MDM: 15 minutes spent on the date of the " encounter doing chart review, history and exam, documentation and further activities per the note.

## 2023-09-22 NOTE — PATIENT INSTRUCTIONS
"Week 39 of Your Pregnancy: Care Instructions     babies can look different than what you see in pictures or movies. Their heads can be a strange shape right after birth. And they may have swollen eyes and red marks on their faces.   You can still get pregnant even if you are breastfeeding. If you don't want to get pregnant, talk to your doctor about birth control.   Tips for week 39 of pregnancy  If you plan to breastfeed, get prepared.     Continue to eat healthy foods.  Keep taking your prenatal vitamins during breastfeeding if your doctor recommends it.  Talk to your doctor before taking any medicines or supplements.  Choose the right birth control for you.     Intrauterine devices (IUDs) are placed in the uterus. Sometimes the IUD can be placed right after giving birth. They work for years.  Hormonal implants are placed under the skin of the arm. They also work for years.  Depo-Provera is a shot. You get it every 3 months.  Birth control pills can be used. They're taken every day.  Tubal ligation (tying your tubes) and vasectomy are surgeries. They're permanent.  Diaphragms, spermicide, and condoms must be used each time you have sex. If you used a diaphragm before, you should get refitted after the baby is born.  A birth control patch or ring can be used. These just can't be started until several weeks after you give birth.  Follow-up care is a key part of your treatment and safety. Be sure to make and go to all appointments, and call your doctor if you are having problems. It's also a good idea to know your test results and keep a list of the medicines you take.  Where can you learn more?  Go to https://www.[employer].net/patiented  Enter A811 in the search box to learn more about \"Week 39 of Your Pregnancy: Care Instructions.\"  Current as of: 2022               Content Version: 13.7    8604-5246 [employer].   Care instructions adapted under license by your healthcare " "professional. If you have questions about a medical condition or this instruction, always ask your healthcare professional. Healthwise, [facility] disclaims any warranty or liability for your use of this information.      Labor Induction  What You Need to Know  What is labor induction?  In most cases, it is best to go into labor naturally. When labor does not start on its own, we may use medicine or other methods to start (induce) labor. This is called induction, which:  Helps the uterus contract  Thins, softens and opens the cervix (opening of the uterus)  When is induction used?  There are two types of induction.  Medical induction may be done to protect the health of the parent or baby if:  There are medical concerns for you or your baby.  You haven't had your baby by 41 weeks.  Induction for non-medical reasons may be done at 39 weeks or later if:  You live far from the hospital.  You've been having contractions for many days.  You've given birth quickly in the past.   We will not perform an induction for non-medical reasons before 39 weeks. Studies show that babies born before 39 weeks may struggle with breathing, feeding, sleeping and staying warm. They are more likely to have health problems and may need to stay in the hospital longer. If we start your labor for medical reasons, the benefits will outweigh these risks. We will talk to you about your risks, benefits and alternatives (other options) before we start your labor.  How is induction done?  We may start your labor by doing one or more of the following:  We may need to help your cervix soften and open (sometimes called \"ripening\") to prepare it for labor. There are two ways to do this:  Medicines--these may be in the form of pills that you swallow or medicines that are put into the vagina next to the cervix.  Mechanical--using either a single or double balloon. These are small balloons that are attached to tubes that go up inside the cervix. The " "balloons are then filled with water to put pressure on the cervix and help the cervix soften and open. Depending on the type of balloon used, you may be allowed to go home after it is placed.  After your cervix is ready:  We may give you medicine through an IV (a small tube placed in your vein). This medicine is called Pitocin. It helps the muscles of your uterus to contract.  We may make a small opening in your bag of water (the sac around your baby). This is called an amniotomy. Sometimes called \"breaking the water\". It may help your uterus contract and your cervix open.  What might happen if my labor is induced?  Some of this depends on how your labor is started and how your body responds. Your labor may be more complicated. You and your baby may need more medical treatments. In general:  You may not go into labor right away. If so, we may send you home with follow-up instructions.  It will be important to monitor you and your baby during the induction.  You may not be able to move around during labor. You will have two belts with monitors attached to your belly. These allow the nurse to watch your contractions and your baby's heart rate.  Your labor may take longer than if you went into labor on your own. It might take more than one day.  If you need cervical ripening, it is important to know that it may be many hours (even days) until delivery happens.  Cervical ripening can be slow and require several doses before your body is ready to labor.  A long labor may increase the risk of infection in mother and baby.  Your labor may not progress as planned. This could lead to a  birth.  Can I plan the date of my delivery?  After 39 weeks, you may ask about planning your delivery date. This is only an option if your body--and your baby--are ready. To find out, we will check your cervix and your baby's heart rate.   If you are ready to be induced, we will give you a date and time to come to the hospital. If " many patients are in labor that day, we may need to start your labor at another time.  If you are not ready, we will not start your labor. It will be safer for your baby to come on its own.  What else do I need to know?  Before you have an induction, make sure you understand the reasons, risks and benefits. Ask your doctor or nurse-midwife:  Why do I need to be induced?  What are the risks to my baby?  How will you start my labor?  How will you know if my baby is ready to be born?  How will you know if my body is ready to give birth?  Where can I get more information?  To learn more about induction, you may visit these websites:  The American College of Nurse-Midwives:  www.mymidwife.org  The American College of Obstetricians and Gynecologists: www.acog.org  Childbirth Connection:  www.childbirthconnection.org  March of Dimes: www.marchofdimes.com  Association of Women's Health, Obstetrics, and  Nursing  www.mfemhe2hjd.org/go-the-full-40&#047;  For informational purposes only. Not to replace the advice of your health care provider. Copyright   2008 [employer]. All rights reserved. Clinically reviewed by the  System Operations Leadership team. Interwise 509291 - REV .

## 2023-09-23 ENCOUNTER — HOSPITAL ENCOUNTER (INPATIENT)
Facility: HOSPITAL | Age: 28
LOS: 1 days | Discharge: HOME | End: 2023-09-24
Attending: FAMILY MEDICINE | Admitting: FAMILY MEDICINE
Payer: COMMERCIAL

## 2023-09-23 PROBLEM — Z34.90 PREGNANCY: Status: ACTIVE | Noted: 2023-09-23

## 2023-09-23 LAB
ABO/RH(D): NORMAL
ANTIBODY SCREEN: NEGATIVE
B-OH-BUTYR SERPL-SCNC: <0.18 MMOL/L
GLUCOSE BLDC GLUCOMTR-MCNC: 188 MG/DL (ref 70–99)
GLUCOSE BLDC GLUCOMTR-MCNC: 74 MG/DL (ref 70–99)
GLUCOSE BLDC GLUCOMTR-MCNC: 74 MG/DL (ref 70–99)
GLUCOSE BLDC GLUCOMTR-MCNC: 76 MG/DL (ref 70–99)
HGB BLD-MCNC: 11.5 G/DL (ref 11.7–15.7)
SPECIMEN EXPIRATION DATE: NORMAL
T PALLIDUM AB SER QL: NONREACTIVE

## 2023-09-23 PROCEDURE — 250N000011 HC RX IP 250 OP 636: Performed by: FAMILY MEDICINE

## 2023-09-23 PROCEDURE — 85018 HEMOGLOBIN: CPT | Performed by: FAMILY MEDICINE

## 2023-09-23 PROCEDURE — 250N000013 HC RX MED GY IP 250 OP 250 PS 637: Performed by: FAMILY MEDICINE

## 2023-09-23 PROCEDURE — 250N000009 HC RX 250: Performed by: FAMILY MEDICINE

## 2023-09-23 PROCEDURE — 86901 BLOOD TYPING SEROLOGIC RH(D): CPT | Performed by: FAMILY MEDICINE

## 2023-09-23 PROCEDURE — 82010 KETONE BODYS QUAN: CPT | Performed by: FAMILY MEDICINE

## 2023-09-23 PROCEDURE — 120N000001 HC R&B MED SURG/OB

## 2023-09-23 PROCEDURE — 86850 RBC ANTIBODY SCREEN: CPT | Performed by: FAMILY MEDICINE

## 2023-09-23 PROCEDURE — 36415 COLL VENOUS BLD VENIPUNCTURE: CPT | Performed by: FAMILY MEDICINE

## 2023-09-23 PROCEDURE — 59400 OBSTETRICAL CARE: CPT | Performed by: FAMILY MEDICINE

## 2023-09-23 PROCEDURE — 722N000001 HC LABOR CARE VAGINAL DELIVERY SINGLE

## 2023-09-23 PROCEDURE — 258N000003 HC RX IP 258 OP 636: Performed by: FAMILY MEDICINE

## 2023-09-23 PROCEDURE — 86780 TREPONEMA PALLIDUM: CPT | Performed by: FAMILY MEDICINE

## 2023-09-23 RX ORDER — NALOXONE HYDROCHLORIDE 0.4 MG/ML
0.4 INJECTION, SOLUTION INTRAMUSCULAR; INTRAVENOUS; SUBCUTANEOUS
Status: DISCONTINUED | OUTPATIENT
Start: 2023-09-23 | End: 2023-09-23 | Stop reason: HOSPADM

## 2023-09-23 RX ORDER — OXYTOCIN/0.9 % SODIUM CHLORIDE 30/500 ML
100-340 PLASTIC BAG, INJECTION (ML) INTRAVENOUS CONTINUOUS PRN
Status: DISCONTINUED | OUTPATIENT
Start: 2023-09-23 | End: 2023-09-24 | Stop reason: HOSPADM

## 2023-09-23 RX ORDER — TENOFOVIR DISOPROXIL FUMARATE 300 MG/1
300 TABLET, FILM COATED ORAL DAILY
Status: DISCONTINUED | OUTPATIENT
Start: 2023-09-23 | End: 2023-09-24 | Stop reason: HOSPADM

## 2023-09-23 RX ORDER — METHYLERGONOVINE MALEATE 0.2 MG/ML
200 INJECTION INTRAVENOUS
Status: DISCONTINUED | OUTPATIENT
Start: 2023-09-23 | End: 2023-09-23 | Stop reason: HOSPADM

## 2023-09-23 RX ORDER — OXYTOCIN 10 [USP'U]/ML
10 INJECTION, SOLUTION INTRAMUSCULAR; INTRAVENOUS
Status: DISCONTINUED | OUTPATIENT
Start: 2023-09-23 | End: 2023-09-24 | Stop reason: HOSPADM

## 2023-09-23 RX ORDER — IBUPROFEN 800 MG/1
800 TABLET, FILM COATED ORAL EVERY 6 HOURS PRN
Status: DISCONTINUED | OUTPATIENT
Start: 2023-09-23 | End: 2023-09-24 | Stop reason: HOSPADM

## 2023-09-23 RX ORDER — CARBOPROST TROMETHAMINE 250 UG/ML
250 INJECTION, SOLUTION INTRAMUSCULAR
Status: DISCONTINUED | OUTPATIENT
Start: 2023-09-23 | End: 2023-09-24 | Stop reason: HOSPADM

## 2023-09-23 RX ORDER — OXYTOCIN/0.9 % SODIUM CHLORIDE 30/500 ML
340 PLASTIC BAG, INJECTION (ML) INTRAVENOUS CONTINUOUS PRN
Status: DISCONTINUED | OUTPATIENT
Start: 2023-09-23 | End: 2023-09-24 | Stop reason: HOSPADM

## 2023-09-23 RX ORDER — KETOROLAC TROMETHAMINE 30 MG/ML
30 INJECTION, SOLUTION INTRAMUSCULAR; INTRAVENOUS
Status: DISCONTINUED | OUTPATIENT
Start: 2023-09-23 | End: 2023-09-24 | Stop reason: HOSPADM

## 2023-09-23 RX ORDER — METOCLOPRAMIDE 10 MG/1
10 TABLET ORAL EVERY 6 HOURS PRN
Status: DISCONTINUED | OUTPATIENT
Start: 2023-09-23 | End: 2023-09-23 | Stop reason: HOSPADM

## 2023-09-23 RX ORDER — OXYTOCIN 10 [USP'U]/ML
10 INJECTION, SOLUTION INTRAMUSCULAR; INTRAVENOUS
Status: DISCONTINUED | OUTPATIENT
Start: 2023-09-23 | End: 2023-09-23 | Stop reason: HOSPADM

## 2023-09-23 RX ORDER — PROCHLORPERAZINE 25 MG
25 SUPPOSITORY, RECTAL RECTAL EVERY 12 HOURS PRN
Status: DISCONTINUED | OUTPATIENT
Start: 2023-09-23 | End: 2023-09-23 | Stop reason: HOSPADM

## 2023-09-23 RX ORDER — NALOXONE HYDROCHLORIDE 0.4 MG/ML
0.2 INJECTION, SOLUTION INTRAMUSCULAR; INTRAVENOUS; SUBCUTANEOUS
Status: DISCONTINUED | OUTPATIENT
Start: 2023-09-23 | End: 2023-09-23 | Stop reason: HOSPADM

## 2023-09-23 RX ORDER — MISOPROSTOL 200 UG/1
800 TABLET ORAL
Status: DISCONTINUED | OUTPATIENT
Start: 2023-09-23 | End: 2023-09-23 | Stop reason: HOSPADM

## 2023-09-23 RX ORDER — PROCHLORPERAZINE MALEATE 10 MG
10 TABLET ORAL EVERY 6 HOURS PRN
Status: DISCONTINUED | OUTPATIENT
Start: 2023-09-23 | End: 2023-09-23 | Stop reason: HOSPADM

## 2023-09-23 RX ORDER — METHYLERGONOVINE MALEATE 0.2 MG/ML
200 INJECTION INTRAVENOUS
Status: DISCONTINUED | OUTPATIENT
Start: 2023-09-23 | End: 2023-09-24 | Stop reason: HOSPADM

## 2023-09-23 RX ORDER — BISACODYL 10 MG
10 SUPPOSITORY, RECTAL RECTAL DAILY PRN
Status: DISCONTINUED | OUTPATIENT
Start: 2023-09-23 | End: 2023-09-24 | Stop reason: HOSPADM

## 2023-09-23 RX ORDER — ONDANSETRON 2 MG/ML
4 INJECTION INTRAMUSCULAR; INTRAVENOUS EVERY 6 HOURS PRN
Status: DISCONTINUED | OUTPATIENT
Start: 2023-09-23 | End: 2023-09-23 | Stop reason: HOSPADM

## 2023-09-23 RX ORDER — NICOTINE POLACRILEX 4 MG
15-30 LOZENGE BUCCAL
Status: DISCONTINUED | OUTPATIENT
Start: 2023-09-23 | End: 2023-09-24 | Stop reason: HOSPADM

## 2023-09-23 RX ORDER — HYDROCORTISONE 25 MG/G
CREAM TOPICAL 3 TIMES DAILY PRN
Status: DISCONTINUED | OUTPATIENT
Start: 2023-09-23 | End: 2023-09-24 | Stop reason: HOSPADM

## 2023-09-23 RX ORDER — ACETAMINOPHEN 325 MG/1
650 TABLET ORAL EVERY 4 HOURS PRN
Status: DISCONTINUED | OUTPATIENT
Start: 2023-09-23 | End: 2023-09-24 | Stop reason: HOSPADM

## 2023-09-23 RX ORDER — OXYTOCIN/0.9 % SODIUM CHLORIDE 30/500 ML
340 PLASTIC BAG, INJECTION (ML) INTRAVENOUS CONTINUOUS PRN
Status: DISCONTINUED | OUTPATIENT
Start: 2023-09-23 | End: 2023-09-23 | Stop reason: HOSPADM

## 2023-09-23 RX ORDER — IBUPROFEN 800 MG/1
800 TABLET, FILM COATED ORAL
Status: DISCONTINUED | OUTPATIENT
Start: 2023-09-23 | End: 2023-09-24 | Stop reason: HOSPADM

## 2023-09-23 RX ORDER — NICOTINE POLACRILEX 4 MG
15-30 LOZENGE BUCCAL
Status: DISCONTINUED | OUTPATIENT
Start: 2023-09-23 | End: 2023-09-23 | Stop reason: HOSPADM

## 2023-09-23 RX ORDER — PENICILLIN G POTASSIUM 5000000 [IU]/1
5 INJECTION, POWDER, FOR SOLUTION INTRAMUSCULAR; INTRAVENOUS ONCE
Status: COMPLETED | OUTPATIENT
Start: 2023-09-23 | End: 2023-09-23

## 2023-09-23 RX ORDER — METOCLOPRAMIDE HYDROCHLORIDE 5 MG/ML
10 INJECTION INTRAMUSCULAR; INTRAVENOUS EVERY 6 HOURS PRN
Status: DISCONTINUED | OUTPATIENT
Start: 2023-09-23 | End: 2023-09-23 | Stop reason: HOSPADM

## 2023-09-23 RX ORDER — PENICILLIN G 3000000 [IU]/50ML
3 INJECTION, SOLUTION INTRAVENOUS EVERY 4 HOURS
Status: DISCONTINUED | OUTPATIENT
Start: 2023-09-23 | End: 2023-09-23 | Stop reason: HOSPADM

## 2023-09-23 RX ORDER — SODIUM CHLORIDE 9 MG/ML
INJECTION, SOLUTION INTRAVENOUS CONTINUOUS
Status: DISCONTINUED | OUTPATIENT
Start: 2023-09-23 | End: 2023-09-23 | Stop reason: HOSPADM

## 2023-09-23 RX ORDER — MISOPROSTOL 200 UG/1
800 TABLET ORAL
Status: DISCONTINUED | OUTPATIENT
Start: 2023-09-23 | End: 2023-09-24 | Stop reason: HOSPADM

## 2023-09-23 RX ORDER — MISOPROSTOL 200 UG/1
400 TABLET ORAL
Status: DISCONTINUED | OUTPATIENT
Start: 2023-09-23 | End: 2023-09-24 | Stop reason: HOSPADM

## 2023-09-23 RX ORDER — CARBOPROST TROMETHAMINE 250 UG/ML
250 INJECTION, SOLUTION INTRAMUSCULAR
Status: DISCONTINUED | OUTPATIENT
Start: 2023-09-23 | End: 2023-09-23 | Stop reason: HOSPADM

## 2023-09-23 RX ORDER — DEXTROSE MONOHYDRATE 25 G/50ML
25-50 INJECTION, SOLUTION INTRAVENOUS
Status: DISCONTINUED | OUTPATIENT
Start: 2023-09-23 | End: 2023-09-24 | Stop reason: HOSPADM

## 2023-09-23 RX ORDER — MISOPROSTOL 200 UG/1
400 TABLET ORAL
Status: DISCONTINUED | OUTPATIENT
Start: 2023-09-23 | End: 2023-09-23 | Stop reason: HOSPADM

## 2023-09-23 RX ORDER — DOCUSATE SODIUM 100 MG/1
100 CAPSULE, LIQUID FILLED ORAL DAILY
Status: DISCONTINUED | OUTPATIENT
Start: 2023-09-23 | End: 2023-09-24 | Stop reason: HOSPADM

## 2023-09-23 RX ORDER — ONDANSETRON 4 MG/1
4 TABLET, ORALLY DISINTEGRATING ORAL EVERY 6 HOURS PRN
Status: DISCONTINUED | OUTPATIENT
Start: 2023-09-23 | End: 2023-09-23 | Stop reason: HOSPADM

## 2023-09-23 RX ORDER — MODIFIED LANOLIN
OINTMENT (GRAM) TOPICAL
Status: DISCONTINUED | OUTPATIENT
Start: 2023-09-23 | End: 2023-09-24 | Stop reason: HOSPADM

## 2023-09-23 RX ORDER — FENTANYL CITRATE 50 UG/ML
50 INJECTION, SOLUTION INTRAMUSCULAR; INTRAVENOUS EVERY 30 MIN PRN
Status: DISCONTINUED | OUTPATIENT
Start: 2023-09-23 | End: 2023-09-23 | Stop reason: HOSPADM

## 2023-09-23 RX ORDER — DEXTROSE MONOHYDRATE 25 G/50ML
25-50 INJECTION, SOLUTION INTRAVENOUS
Status: DISCONTINUED | OUTPATIENT
Start: 2023-09-23 | End: 2023-09-23 | Stop reason: HOSPADM

## 2023-09-23 RX ORDER — CITRIC ACID/SODIUM CITRATE 334-500MG
30 SOLUTION, ORAL ORAL
Status: DISCONTINUED | OUTPATIENT
Start: 2023-09-23 | End: 2023-09-23 | Stop reason: HOSPADM

## 2023-09-23 RX ADMIN — TENOFOVIR DISOPROXIL FUMARATE 300 MG: 300 TABLET, FILM COATED ORAL at 16:18

## 2023-09-23 RX ADMIN — TRANEXAMIC ACID 1 G: 1 INJECTION, SOLUTION INTRAVENOUS at 11:23

## 2023-09-23 RX ADMIN — PENICILLIN G 3 MILLION UNITS: 3000000 INJECTION, SOLUTION INTRAVENOUS at 10:32

## 2023-09-23 RX ADMIN — DOCUSATE SODIUM 100 MG: 100 CAPSULE, LIQUID FILLED ORAL at 16:18

## 2023-09-23 RX ADMIN — METHYLERGONOVINE MALEATE 200 MCG: 0.2 INJECTION INTRAVENOUS at 14:15

## 2023-09-23 RX ADMIN — SODIUM CHLORIDE: 900 INJECTION INTRAVENOUS at 06:05

## 2023-09-23 RX ADMIN — WITCH HAZEL: 500 SOLUTION RECTAL; TOPICAL at 16:23

## 2023-09-23 RX ADMIN — Medication 340 ML/HR: at 14:15

## 2023-09-23 RX ADMIN — KETOROLAC TROMETHAMINE 30 MG: 30 INJECTION, SOLUTION INTRAMUSCULAR; INTRAVENOUS at 14:34

## 2023-09-23 RX ADMIN — BENZOCAINE AND LEVOMENTHOL: 200; 5 SPRAY TOPICAL at 16:23

## 2023-09-23 RX ADMIN — PENICILLIN G POTASSIUM 5 MILLION UNITS: 5000000 POWDER, FOR SOLUTION INTRAMUSCULAR; INTRAPLEURAL; INTRATHECAL; INTRAVENOUS at 06:04

## 2023-09-23 ASSESSMENT — ACTIVITIES OF DAILY LIVING (ADL)
ADLS_ACUITY_SCORE: 22
DIFFICULTY_EATING/SWALLOWING: NO
COMMUNICATION: OTHER (SEE COMMENTS)
COMMUNICATION_MANAGEMENT: VERBAL
TOILETING_ISSUES: NO
WEAR_GLASSES_OR_BLIND: NO
DOING_ERRANDS_INDEPENDENTLY_DIFFICULTY: YES
ADLS_ACUITY_SCORE: 22
CONCENTRATING,_REMEMBERING_OR_MAKING_DECISIONS_DIFFICULTY: NO
ADLS_ACUITY_SCORE: 22
DRESSING/BATHING_DIFFICULTY: NO
ADLS_ACUITY_SCORE: 22
WALKING_OR_CLIMBING_STAIRS_DIFFICULTY: NO
HEARING_DIFFICULTY_OR_DEAF: NO
DIFFICULTY_COMMUNICATING: YES
ADLS_ACUITY_SCORE: 22
ADLS_ACUITY_SCORE: 35
CHANGE_IN_FUNCTIONAL_STATUS_SINCE_ONSET_OF_CURRENT_ILLNESS/INJURY: NO
FALL_HISTORY_WITHIN_LAST_SIX_MONTHS: NO
ADLS_ACUITY_SCORE: 22

## 2023-09-23 NOTE — PROGRESS NOTES
Wendy Alonzo presented self to [facility] with c/o spontaneous uterine contractions. Onset 0300.EFM applied.  Ve 4-5/80/-2. FHT category: 1  Contraction pattern observed: every 5-7 minutes, palpate moderate.  /68 (BP Location: Right arm, Patient Position: Supine, Cuff Size: Adult Regular)   Pulse 66   Temp 98.9  F (37.2  C) (Oral)   Resp 16   LMP 12/08/2022 (Approximate)  .  Tests performed: Routine lab work  Dr Scruggs notified of patient arrival, cervical check and frequency of contractions, orders received.     Ruth Duncan RN

## 2023-09-23 NOTE — PLAN OF CARE
"  Problem: Plan of Care - These are the overarching goals to be used throughout the patient stay.    Goal: Plan of Care Review  Description: The Plan of Care Review/Shift note should be completed every shift.  The Outcome Evaluation is a brief statement about your assessment that the patient is improving, declining, or no change.  This information will be displayed automatically on your shift note.  Outcome: Progressing  Flowsheets (Taken 9/23/2023 0722)  Plan of Care Reviewed With: patient  Goal: Patient-Specific Goal (Individualized)  Description: You can add care plan individualizations to a care plan. Examples of Individualization might be:  \"Parent requests to be called daily at 9am for status\", \"I have a hard time hearing out of my right ear\", or \"Do not touch me to wake me up as it startles me\".  Outcome: Progressing  Goal: Absence of Hospital-Acquired Illness or Injury  Outcome: Progressing  Goal: Optimal Comfort and Wellbeing  Outcome: Progressing  Goal: Readiness for Transition of Care  Outcome: Progressing  Flowsheets (Taken 9/23/2023 0537)  Concerns to be Addressed: no discharge needs identified  Intervention: Mutually Develop Transition Plan  Recent Flowsheet Documentation  Taken 9/23/2023 0537 by Ruth Duncan, RN  Concerns to be Addressed: no discharge needs identified  Taken 9/23/2023 0535 by Ruth Duncan, RN  Equipment Currently Used at Home: none   Goal Outcome Evaluation:      Plan of Care Reviewed With: patient  Pt verbalized understanding                 "

## 2023-09-23 NOTE — PROGRESS NOTES
Labor Progress Note  2023 12:50 PM  ________________________________________________________________________    ASSESSMENT & PLAN:   28 year old  at 40w2d.  Active labor progressing.  Declines analgesia.  GBS positive - 2 doses PCN complete  GDM - blood sugars in the 70s  Hepatitis B, chronic.  History of uterine atony.  ________________________________________________________________________    SUBJECTIVE:  Contractions stronger. Feels rectal pressure with contractions.     OBJECTIVE:  BP 98/57   Pulse 66   Temp 97.9  F (36.6  C) (Oral)   Resp 16   LMP 2022 (Approximate)   Breastfeeding Yes   FHR: Baseline: 135 bpm, Variability: Moderate (6 - 25 bpm), Accelerations: Absent, Decelerations: no significant, Trends over time: stable, and Category I - baseline FHR of 110 to 160 bpm, moderate FHR variability, and Absence of late or variable FHR decelerations  Uterine Contractions:  regular, every 2-4 minutes.  Cervix: dilation 9 cm , 90% effaced, soft, and -1 station.  Fluid: None noted.  Fetal Presentation: vertex.    Recent Results (from the past 24 hour(s))   Hemoglobin    Collection Time: 23  6:17 AM   Result Value Ref Range    Hemoglobin 11.5 (L) 11.7 - 15.7 g/dL   Treponema Abs w Reflex to RPR and Titer    Collection Time: 23  6:17 AM   Result Value Ref Range    Treponema Antibody Total Nonreactive Nonreactive   Ketone Beta-Hydroxybutyrate Quantitative    Collection Time: 23  6:17 AM   Result Value Ref Range    Ketone (Beta-Hydroxybutyrate) Quantitative <0.18 <=0.30 mmol/L   Adult Type and Screen    Collection Time: 23  6:17 AM   Result Value Ref Range    ABO/RH(D) B POS     Antibody Screen Negative Negative    SPECIMEN EXPIRATION DATE [id number]    Glucose by meter    Collection Time: 23  7:34 AM   Result Value Ref Range    GLUCOSE BY METER POCT 74 70 - 99 mg/dL   Glucose by meter    Collection Time: 23 10:45 AM   Result Value Ref Range    GLUCOSE BY  METER POCT 74 70 - 99 mg/dL   Glucose by meter    Collection Time: 09/23/23 12:29 PM   Result Value Ref Range    GLUCOSE BY METER POCT 76 70 - 99 mg/dL      Completed by:   Blanche Scruggs MD, M.D.  [facility]  9/23/2023 12:50 PM

## 2023-09-23 NOTE — H&P
Maternal Admission H&P  [facility]  Date of Admission: 2023  Date of Service: 2023    Name      Wendy Alonzo         1995  MRN       [id number]  PCP        Dr. Blanche Scruggs at [facility].    ________________________________________________________________________    Assessment and Plan:  28 year old  at 40w2d.  Baby AGA. Anticipate .  Group B strep: positive - 2 doses PCN so far  GDM, diet-controlled, well-controleld, EFW 20%  Mild polyhydramnios DC 27  Hepatitis B on antiviral, Viread  History uterine atony without PPH with last baby.  Needs  - called for in person .  ________________________________________________________________________    Chief Complaint: active labor.    HPI: Wendy Alonzo is a 28 year old woman at 40w2d, based on an Estimated Date of Delivery: Sep 21, 2023. She presents with early labor 4 cm dilated. Now 6 cm and contractions getting stronger.     Patient Active Problem List    Diagnosis Date Noted    Pregnancy 2023     Priority: High    Group B streptococcal carriage complicating pregnancy 2023     Priority: Medium    Diet controlled gestational diabetes mellitus (GDM) in third trimester 2023     Priority: Medium    Chronic viral hepatitis B without delta-agent (H) 2015     Priority: Medium     High viral load, HBeAg+. On tenofovir 2023. Seeing [facility].  Diagnosed 3/15. Hep A immune, hep C negative.        Echogenic focus of heart of fetus affecting antepartum care of mother 2023     Priority: Low     Echogenic intracardiac focus and hypoplastic nasal bone. Declined genetic eval.      Overweight (BMI 25.0-29.9) 2019     Priority: Low    History of  delivery 2018     Priority: Low     Spontaneous  at 25w2d .  Subsequent term  3/19.  [facility] recommendations:  Transvaginal cervical length measurements every 2 weeks until 23-24 weeks, increasing to  weekly if the cervical length is between 25-29 mm.  Ultrasound-indicated cerclage versus vaginal progesterone should be discussed if shortening <25 mm is diagnosed <24 weeks.  Administration of  corticosteroids if the patient is deemed to be at increased risk of imminent  delivery.  Urine culture every trimester with aggressive treatment of bacteriuria.  Clinical evaluation of  labor symptoms.       Language barrier 10/22/2015     Priority: Low     Needs Hmong .          OB History    Para Term  AB Living   6 4 3 1 1 4   SAB IAB Ectopic Multiple Live Births   1 0 0 0 4      # Outcome Date GA Lbr Noah/2nd Weight Sex Delivery Anes PTL Lv   6 Current            5 SAB 20     AB, MISSED         Birth Comments: D&C   4 Term 19 38w3d 16:14 / 00:09 3.09 kg (6 lb 13 oz) F Vag-Spont None N ROXANNE      Birth Comments: Chronic hep B. Transverse lie in labor, successful ECV. . Postpartum atony (pitocin, cytotec).      Name: Janina      Apgar1: 9  Apgar5: 10   3  17 25w2d 02:14  00:17 0.86 kg (1 lb 14.3 oz) M Vag-Spont None Y ROXANNE      Birth Comments: Spontaneous  labor at 24+6 weeks. Admitted and treated with magnesium, betamethasone. See placental pathology notes.      Complications:  delivery, Premature delivery      Name: Naresh Alonzo      Apgar1: 8  Apgar5: 8   2 Term 16 39w4d 06:26 / 00:08 3.09 kg (6 lb 13 oz) M Vag-Spont None N ROXANNE      Birth Comments: High hepatitis B viral load, declined treatment. Spontaneous labor without complication.  mL      Complications: Chronic hepatitis B (H), Short interval between pregnancies affecting pregnancy in second trimester, antepartum, Chronic hepatitis B virus infection (H)      Name: Simone James      Apgar1: 8  Apgar5: 9   1 Term 11/16/15 41w1d 09:14  00:29 3.062 kg (6 lb 12 oz) F Vag-Spont None N ROXANNE      Birth Comments: Spontaneous labor.  on birthing stool. Brisk bleeding  after placenta.  mL.      Complications: Chronic hepatitis B (H), Insufficient weight gain during pregnancy, Chronic hepatitis B virus infection (H)      Name: Shira Ramirez      Apgar1: 9  Apgar5: 9     Review of Systems Negative except what is noted in HPI.  Past Medical History:   Diagnosis Date    Diet controlled gestational diabetes mellitus (GDM) in third trimester 2023    History of  delivery, currently pregnant 2018    Spontaneous  at 25w2d . Plan: IM progesterone (Cecily) 16-36 weeks, consult OB/gyn - Dr. Dasilva, cervical length US at 16 weeks.    Infectious viral hepatitis     Hep B    Miscarriage 2020     delivery, delivered 2017    Delivered at 25 weeks 2 days. Spontaneous  labor started at 24 weeks 3 days.     labor 2017    Ctx @ 24w3d.  Fetal fibronectin positive & admitted to [facility] @ 24w6d, given betamethasone, magnesium and terbutaline tocolysis. Delivered @ 25w2d. BW 1 lb 14 oz.       Past Surgical History:   Procedure Laterality Date    [place] SURG RX MISSED ABORTN,1ST TRI N/A 2020    Procedure: SUCTION DILATION AND CURETTAGE;  Surgeon: Darshana Eagle DO;  Location: [facility];  Service: Gynecology   Black walnut pollen allergy skin test  Family History   Problem Relation Age of Onset    Hepatitis Mother         Chronic hepatitis B    Tuberculosis Mother     No Known Problems Father     No Known Problems Son     No Known Problems Daughter      Birth Son 0.00        25 weeks 2 days    No Known Problems Daughter      Social History     Socioeconomic History    Marital status: Single     Spouse name: Not on file    Number of children: 4    Years of education: Not on file    Highest education level: Not on file   Occupational History    Occupation: Packaging   Tobacco Use    Smoking status: Never     Passive exposure: Never    Smokeless tobacco: Never    Tobacco comments:     no passive exposure   Vaping  Use    Vaping Use: Never used   Substance and Sexual Activity    Alcohol use: No    Drug use: No    Sexual activity: Yes     Partners: Male     Birth control/protection: None     Comment: Undecided re: contraception 12/17   Other Topics Concern    Not on file   Social History Narrative    : Berenice Ramirez. Kids: Shira 2015, Simone 2016, Naresh 2017, Janina 2019.     Social Determinants of Health     Financial Resource Strain: Low Risk  (9/22/2023)    Financial Resource Strain     Within the past 12 months, have you or your family members you live with been unable to get utilities (heat, electricity) when it was really needed?: No   Food Insecurity: Low Risk  (9/22/2023)    Food Insecurity     Within the past 12 months, did you worry that your food would run out before you got money to buy more?: No     Within the past 12 months, did the food you bought just not last and you didn t have money to get more?: No   Transportation Needs: Low Risk  (9/22/2023)    Transportation Needs     Within the past 12 months, has lack of transportation kept you from medical appointments, getting your medicines, non-medical meetings or appointments, work, or from getting things that you need?: No   Physical Activity: Not on file   Stress: Not on file   Social Connections: Not on file   Interpersonal Safety: Not on file   Housing Stability: Low Risk  (9/22/2023)    Housing Stability     Do you have housing? : Yes     Are you worried about losing your housing?: No     Prior to Admission Medication List  Medications Prior to Admission   Medication Sig Dispense Refill Last Dose    acetone urine (KETOSTIX) test strip Use to test urine once daily in the morning. 50 strip 1     blood glucose (NO BRAND SPECIFIED) test strip Use to test blood sugar 4 times daily or as directed. To accompany: Blood Glucose Monitor Brands: per insurance. 200 strip 6     blood glucose monitoring (NO BRAND SPECIFIED) meter device kit Use to test blood sugar 4  times daily or as directed. Preferred blood glucose meter OR supplies to accompany: Blood Glucose Monitor Brands: per insurance. 1 kit 0     Prenatal MV-Min-Fe Fum-FA-DHA (PRENATAL MULTIVITAMIN PLUS DHA) 27-0.8-250 MG CAPS Take 1 tablet by mouth daily OK to substitute formulary equivalent 100 capsule 3     tenofovir (VIREAD) 300 MG tablet Take 1 tablet (300 mg) by mouth daily 90 tablet 0     thin (NO BRAND SPECIFIED) lancets Use with lanceting device. To accompany: Blood Glucose Monitor Brands: per insurance. 100 each 6       Allergies  Allergies   Allergen Reactions    Black Clayton Pollen Allergy Skin Test Itching     Itchy throat + eyes. No swelling/breathing problems.  Itchy throat + eyes. No swelling/breathing problems.     Immunization History   Administered Date(s) Administered    COVID-19 Bivalent 12+ (Pfizer) 12/31/2022    COVID-19 MONOVALENT 12+ (Pfizer) 12/17/2021    COVID-19 Monovalent 12+ (Pfizer 2022) 01/28/2022    Flu, Unspecified 11/30/2009    HEPATITIS A (PEDS 12M-18Y) 05/17/2006    HPV Quadrivalent 03/04/2011, 07/22/2011, 11/10/2011    HepA, Unspecified 05/17/2006, 03/04/2011    HepB, Unspecified 05/26/2000, 05/26/2004, 08/22/2005    Hepatitis A (ADULT 19+) 03/04/2011    Hepatitis A Immunity: Titer/MD Dx 08/06/2015    Hepatitis B Immunity: Titer 03/27/2015    Hepatitis B, Adult 05/26/2000    Hepatitis B, Peds 05/26/2004, 08/22/2005    Hepb Ig, Im (hbig) 08/22/2005    Influenza (H1N1) 11/30/2009    Influenza (IIV3) PF 11/10/2011    Influenza Vaccine >6 months (Alfuria,Fluzone) 10/01/2014, 09/01/2015, 09/03/2015, 11/02/2018, 02/22/2023, 09/07/2023    Influenza Vaccine, 6+MO IM (QUADRIVALENT W/PRESERVATIVES) 11/10/2011, 09/06/2016, 09/11/2017    MMR 05/26/2004, 08/22/2005    Meningococcal ACWY (Menactra ) 03/04/2011    Poliovirus, inactivated (IPV) 05/26/2004, 10/10/2004, 08/22/2005, 10/10/2005, 05/17/2006    Rubella Immunity: Titer/md Dx 07/24/2017    TDAP (Adacel,Boostrix) 05/17/2006, 03/04/2011,  09/03/2015, 09/06/2016, 01/25/2019, 06/23/2023    Td (Adult), Adsorbed 05/26/2004, 08/22/2005    Varicella 05/26/2004, 11/19/2016      Physical Exam  Patient Vitals for the past 24 hrs:   BP Temp Temp src Pulse Resp   09/23/23 1040 99/62 -- -- -- 16   09/23/23 0741 101/61 -- -- -- --   09/23/23 0739 (!) 85/48 97.8  F (36.6  C) Oral -- 16   09/23/23 0500 108/68 98.9  F (37.2  C) Oral 66 16     Wt Readings from Last 1 Encounters:   09/22/23 71.2 kg (157 lb)   Prepregnancy: 63.5 kg (140 lb), BMI 29.39. Total gain: 7.711 kg (17 lb) (expected gain: 7 kg (15 lb)-11.5 kg (25 lb)).    HEART: RRR, no murmur  LUNGS: CTA bilaterally  Fetal Heart Tones: Baseline 135 bpm, variability moderate (6-25 bpm), no decelerations. Reactive.  CONTRACTIONS:  regular, every 2-4 minutes.  CERVIX: dilation 6 cm per RN  FLUID: None noted.  Fetal Presentation: vertex.  Labs    Admission on 09/23/2023   Component Date Value Ref Range Status    Hemoglobin 09/23/2023 11.5 (L)  11.7 - 15.7 g/dL Final    Ketone (Beta-Hydroxybutyrate) Vinny* 09/23/2023 <0.18  <=0.30 mmol/L Final    ABO/RH(D) 09/23/2023 B POS   Final    Antibody Screen 09/23/2023 Negative  Negative Final    SPECIMEN EXPIRATION DATE 09/23/2023 [id number]   Final    GLUCOSE BY METER POCT 09/23/2023 74  70 - 99 mg/dL Final    GLUCOSE BY METER POCT 09/23/2023 74  70 - 99 mg/dL Final      Group B Strep PCR   Date Value Ref Range Status   08/25/2023 Positive (A) Negative Final     Comment:     ALERT: Streptococcus agalactiae (Group B Streptococcus) has a high rate of resistance to clindamycin. Therefore, clindamycin is not recommended for treatment unless susceptibility testing has been performed.      Antibody Screen   Date Value Ref Range Status   09/23/2023 Negative Negative Final     Hepatitis B Surface Antigen   Date Value Ref Range Status   02/22/2023 Reactive (A) Nonreactive Final     Comment:     Confirmed Reactive     Chlamydia trachomatis   Date Value Ref Range Status    02/22/2023 Negative Negative Final     Comment:     A negative result by transcription mediated amplification does not preclude the presence of C. trachomatis infection because results are dependent on proper and adequate collection, absence of inhibitors and sufficient rRNA to be detected.     Neisseria gonorrhoeae   Date Value Ref Range Status   06/29/2020 Negative Negative Final     Hemoglobin   Date Value Ref Range Status   09/23/2023 11.5 (L) 11.7 - 15.7 g/dL Final        Completed by:   Blanche Scruggs MD  [facility]  9/23/2023 11:27 AM   used: Louis.

## 2023-09-23 NOTE — L&D DELIVERY NOTE
Delivery Summary  [facility]  Date of Service: 2023    Name      Wendy Alonzo         1995  MRN       [id number]  PCP        Dr. Blanche Scruggs at [facility].    Pre-delivery Diagnoses:  Intrauterine pregnancy at 40w2d    Gestational diabetes. Diet-controlled, well controled.  Mild polyhydramnios.  Chronic hepatitis B.  Group B strep carrier.  Language barrier.  History of uterine atony with prior delivery    Post-delivery Diagnoses:  Normal Spontaneous Vaginal Delivery at 40w2d EGA  Delivery of a male infant with  Apgar scores: 9,9  Birth weight: 3.14 kg (6 lb 14.8 oz)   Uterine atony without hemorrhage.    Labor Complications: Gestational diabetes;Viral hepatitis B chronic (H);Group B Streptococcus carrier, +RV culture, currently pregnant;Uterine atony, postpartum, without hemorrhage     Delivery Type: Vaginal, Spontaneous     Narrative:  On 2023, Wendy Alonzo, a 28 year old , delivered a viable male infant at 40w2d with apgars of 9 and 9 via Vaginal, Spontaneous Delivery.    [facility]y [facility] on 20[facility] for active labor. Upon arrival she was 4 cm dilated, with intact membranes, and will. Labor progressed spontaneously.    Her group B Strep (GBS) carrier status was positive. She received 2 intrapartum doses of IV penicillin. She received nothing for induction/augmentation. She received nothing for analgesia. Blood sugar monitoring performed with well-controlled blood sugars. Tranexamic acid given in late first stage. SROM occurred in late second stage with thick meconium. Delivery team was called to delivery.    Delivery was via vaginal, spontaneous delivery to a sterile field under none  anesthesia. Infant delivered in vertex  middle  occiput  anterior position. Shoulders delivered without difficulty. The baby was placed on the patient's abdomen. He cried immediately and was vigorous. Cord complications: none . Delayed  cord clamping was performed.     Placenta delivered at 2023  2:09 PM . Placenta intact, with meconium-stained membranes and moderate calcification.  3 vessels  were noted. Fundal massage performed and fundus found to be atonic. The following uterotonics were given: Pitocin (IV) and Methergine. Uterus massaged and clots evacuated. Perineum, vagina, cervix were inspected, and the following lacerations were noted: labial . Repair was not needed. QBL: 225 mL.     Excellent hemostasis was noted. No needles or sponges used. Infant and patient in delivery room in good and stable condition.   _________________    GA: 40w2d  GP:   Labor Complications: None Gestational diabetes;Viral hepatitis B chronic (H);Group B Streptococcus carrier, +RV culture, currently pregnant;Uterine atony, postpartum, without hemorrhage   QBL:  225 mL  Delivery Type: Vaginal, Spontaneous   Duration of Ruptured Membranes: 0h 05m   Weight: 3.14 kg (6 lb 14.8 oz)   Apgar scores: 9 , 9         Phillip Alonzo [[id number]]      Labor Event Times      Latent labor onset date/time: 2023 0300    Active labor onset date: 23 Onset time: 10:10 AM CDT   Dilation complete date: 23 Complete time:  1:40 PM   Start pushing date/time: 2023 1340          Labor Length      1st Stage (hrs): 3 (min): 30   2nd Stage (hrs): 0 (min): 26   3rd Stage (hrs): 0 (min): 2          Labor Events     labor?: No   steroids: None  Labor Type: Spontaneous     Antibiotics received during labor?: Yes  Reason for Antibiotics: GBS  Antibiotics received for GBS: Penicillin  Antibiotics Given (GBS): Greater than 4 hours prior to delivery     Rupture identifier: Sac 1  Rupture date/time: 23 1401   Rupture type: Spontaneous Rupture of Membranes  Fluid color: Meconium     Augmentation: None       Delivery/Placenta Date and Time      Delivery Date: 23 Delivery Time:  2:06 PM   Placenta Date/Time: 2023  2:09 PM  Oxytocin given  "at the time of delivery: after delivery of baby  Delivering clinician: Blanche Scruggs MD   Other personnel present at delivery:  Provider Role   Elyssa Mullen, BERTO Farrar, Edda MONSALVE, Kendal Kellogg, Lindsay Reyes APRN CNP [place], Jolie Ramírez RN              Vaginal Counts       Initial count performed by 2 team members:  Two Team Members   Sheba Mullen         Needles Suture Needles Sponges (RETIRED) Instruments   Initial counts   5    Added to count       Relief counts       Final counts   5            Placed during labor Accounted for at the end of labor   FSE No NA   IUPC No NA   Cervidil No NA                  Final count performed by 2 team members:  Two Team Members   Sheba Mullen      Final count correct?: Yes  Pre-Birth Team Brief: Complete  Post-Birth Team Debrief: Complete       Apgars    Living status: Living   1 Minute 5 Minute 10 Minute 15 Minute 20 Minute   Skin color: 1  1       Heart rate: 2  2       Reflex irritability: 2  2       Muscle tone: 2  2       Respiratory effort: 2  2       Total: 9  9       Apgars assigned by: FERNIE THOMSON       Cord      Vessels: 3 Vessels    Cord Complications: None               Cord Blood Disposition: Discard    Gases Sent?: No    Delayed cord clamping?: Yes    Cord Clamping Delay (seconds):  seconds    Stem cell collection?: No           [place] Resuscitation    Methods: None  [place] Care at Delivery: Called to attend the delivery due to meconium fluid, GDM diet controlled, GBS +, and Hep B +.  Infant delivered with spontaneous cry and respirations.  NICU team not needed and dismissed.     Lindsay MENDOZA, CNP 2023, 2:14 PM    Output in Delivery Room: Stool        Measurements      Weight: 6 lb 14.8 oz Length: 1' 7.5\"     Head circumference: 32.4 cm    Output in delivery room: Stool       Skin to Skin and Feeding Plan      Skin to skin initiation date/time: 1841    Skin to skin with: Mother  Skin to skin end date/time: " 1/9/1841           Labor Events and Shoulder Dystocia    Fetal Tracing Prior to Delivery: Category 1  Shoulder dystocia present?: Neg       Delivery (Maternal) (Provider to Complete) (220200)    Episiotomy: None  Perineal lacerations: None      Labial laceration: bilateral Repaired?: No   Repair suture: None  Genital tract inspection done: Pos       Blood Loss  Mother: Wendy Alonzo #[id number]     Start of Mother's Information      Delivery Blood Loss  09/23/23 1010 - 09/24/23 1406      Delivery QBL (mL) Hospital Encounter 250 mL    Postpartum QBL (mL) Hospital Encounter 127 mL    Total  377 mL               End of Mother's Information  Mother: Wendy Alonzo #[id number]                Delivery - Provider to Complete (906265)    Delivering clinician: Blanche Scruggs MD  Delivery Type (Choose the 1 that will go to the Birth History): Vaginal, Spontaneous                         Other personnel:  Provider Role   Elyssa Mullen, Edda Puentes, Kendal Kellogg, RT    Lindsay Campbell, APRN Jolie Jackson RN                     Placenta    Date/Time: 9/23/2023  2:09 PM  Removal: Spontaneous  Comments: Membranes meconium stained. Placenta moderately calcified  Disposition: Hospital disposal             Anesthesia    Method: None                    Presentation and Position    Presentation: Vertex    Position: Middle Occiput Anterior                     Completed by:   Blanche Scruggs MD  [facility]  9/23/2023 2:25 PM   used: Louis.

## 2023-09-24 VITALS
RESPIRATION RATE: 16 BRPM | TEMPERATURE: 98.1 F | DIASTOLIC BLOOD PRESSURE: 53 MMHG | OXYGEN SATURATION: 97 % | SYSTOLIC BLOOD PRESSURE: 93 MMHG | HEART RATE: 60 BPM

## 2023-09-24 LAB
GLUCOSE BLDC GLUCOMTR-MCNC: 80 MG/DL (ref 70–99)
HGB BLD-MCNC: 11 G/DL (ref 11.7–15.7)

## 2023-09-24 PROCEDURE — 99207 PR SUBSEQUENT HOSPITAL CARE FOR MOTHER, 15 MINUTES: CPT | Performed by: FAMILY MEDICINE

## 2023-09-24 PROCEDURE — 250N000013 HC RX MED GY IP 250 OP 250 PS 637: Performed by: FAMILY MEDICINE

## 2023-09-24 PROCEDURE — 85018 HEMOGLOBIN: CPT | Performed by: FAMILY MEDICINE

## 2023-09-24 PROCEDURE — 36415 COLL VENOUS BLD VENIPUNCTURE: CPT | Performed by: FAMILY MEDICINE

## 2023-09-24 RX ORDER — IBUPROFEN 600 MG/1
600 TABLET, FILM COATED ORAL EVERY 6 HOURS PRN
Qty: 30 TABLET | Refills: 0 | Status: SHIPPED | OUTPATIENT
Start: 2023-09-24 | End: 2023-10-08

## 2023-09-24 RX ORDER — ACETAMINOPHEN 325 MG/1
325-650 TABLET ORAL EVERY 6 HOURS PRN
Qty: 30 TABLET | Refills: 0 | Status: SHIPPED | OUTPATIENT
Start: 2023-09-24 | End: 2023-10-24

## 2023-09-24 RX ORDER — DOCUSATE SODIUM 100 MG/1
100 CAPSULE, LIQUID FILLED ORAL 2 TIMES DAILY PRN
Qty: 28 CAPSULE | Refills: 0 | Status: SHIPPED | OUTPATIENT
Start: 2023-09-24 | End: 2023-10-24

## 2023-09-24 RX ADMIN — IBUPROFEN 800 MG: 800 TABLET ORAL at 14:06

## 2023-09-24 RX ADMIN — ACETAMINOPHEN 325 MG: 325 TABLET ORAL at 07:44

## 2023-09-24 RX ADMIN — DOCUSATE SODIUM 100 MG: 100 CAPSULE, LIQUID FILLED ORAL at 07:44

## 2023-09-24 RX ADMIN — TENOFOVIR DISOPROXIL FUMARATE 300 MG: 300 TABLET, FILM COATED ORAL at 07:44

## 2023-09-24 ASSESSMENT — ACTIVITIES OF DAILY LIVING (ADL)
ADLS_ACUITY_SCORE: 22

## 2023-09-24 NOTE — PLAN OF CARE
Problem: Plan of Care - These are the overarching goals to be used throughout the patient stay.    Goal: Optimal Comfort and Wellbeing  Intervention: Monitor Pain and Promote Comfort  Recent Flowsheet Documentation  Taken 2023 8140 by Violetta Guevara RN  Pain Management Interventions: medication (see MAR)   Goal Outcome Evaluation:       Pt rates perineum pain 2/10, Tylenol prn with relief.  Fundus- Firm, Midline    VSS. BP runs lows inpt- 91/55 Asymptomatic.       Problem: Postpartum (Vaginal Delivery)  Goal: Successful Maternal Role Transition  Outcome: Progressing     Pt bonding well with , attentive to needs. Formula feeding on demand, states breastfeeding at home. Education provided.     Updated parents on plan of care & hopeful for a late discharge after 24 hr testing.    Violetta Guevara, RN

## 2023-09-24 NOTE — PROGRESS NOTES
All Discharge education completed including review of warning signs. Pt verbalized understanding & deny having additional questions. Follow-up is planned for 2 & 6 weeks.    Violetta Guevara RN

## 2023-09-24 NOTE — PROVIDER NOTIFICATION
Dr. Jay was notified at 2100 regarding blood pressure of 86/54. Patient is declining dizziness and lightheadedness, bleeding is scant, and uterus is very firm. MD would like to be notified if patient becomes symptomatic.     Soumya Moscoso RN  9/23/2023 9:03 PM

## 2023-09-24 NOTE — PLAN OF CARE
Patient physical assessments are WNL. Systolic blood pressures are in the 80's and 90's. RN encouraged increased intake of water. Patient denies lightheadedness and dizziness. Patient is bonding well with infant and is ambulating independently.    Soumya Moscoso RN  9/24/2023

## 2023-09-24 NOTE — DISCHARGE SUMMARY
Maternal Discharge Summary  [facility]  Date of Service: 2023    Name      Wendy Alonzo         1995  MRN       [id number]  PCP        Blanche Oneil at [facility], [phone].    ________________________________________________________________________    Assessment:  Wendy Alonzo is a 28 year old now  s/p vaginal, spontaneous  at 40w2d on 23.  GBS colonization  Diet-controlled gestational diabetes (well-controlled). Fasting blood sugar this morning normal.  Hepatitis B:  On tenofovir 3rd trimester due to high viral load  Immediate postpartum atony, but no postpartum hemorrhage with TXA, pitocin, methergine (QBL 377ml, Hemoglobin decreased 11.5->11.0)    Discharge Plan:   Discharge to Home. Condition at Discharge:  stable  Ok to stop tenofovir; PCP will follow-up  Plan 2hr GTT in future to screen for type 2 diabetes  Physical activity: Regular.  Diet:  Regular.  Home care nurse: not indicated.  Lactation clinic appointment:  not ordered (experience breastfeeding, not breastfeeding in hospital) .  Contraception plan: undecided, will follow up at postpartum visit.  Follow up with Dr. Blanche Scruggs  in 2 weeks and 6 weeks for routine postpartum care.            Medication List        Started      acetaminophen 325 MG tablet  Commonly known as: TYLENOL  325-650 mg, Oral, EVERY 6 HOURS PRN     docusate sodium 100 MG capsule  Commonly known as: COLACE  100 mg, Oral, 2 TIMES DAILY PRN     ibuprofen 600 MG tablet  Commonly known as: ADVIL/MOTRIN  600 mg, Oral, EVERY 6 HOURS PRN            Discontinued      tenofovir 300 MG tablet  Commonly known as: VIREAD             ________________________________________________________________________    Admission Date:  2023  Discharge Date:  2023  Gestational Age at Delivery: 40w2d    Principal Diagnosis: Labor and delivery  Delivery type: Vaginal, Spontaneous     Principal Problem:      (normal spontaneous vaginal delivery)  Active Problems:    Chronic viral hepatitis B without delta-agent (H)    Language barrier    Pregnancy    Diet controlled gestational diabetes mellitus (GDM) in third trimester    Group B streptococcal carriage complicating pregnancy    Postpartum uterine atony without hemorrhage      Subjective:  Patient denies headache, dizziness, dyspnea, and leg pain. Ambulating and eating.    Discharge Exam:  Patient Vitals for the past 24 hrs:   BP Temp Temp src Pulse Resp SpO2   23 0740 91/55 97.5  F (36.4  C) Oral 68 16 97 %   23 0415 (!) 84/55 97.7  F (36.5  C) Oral 67 16 98 %   23 0020 98/58 98  F (36.7  C) Oral 63 16 99 %   23 (!) 86/54 98.1  F (36.7  C) Oral 63 16 98 %   23 1542 91/63 -- -- -- 16 --   23 1527 106/69 -- -- -- 16 --   23 1512 103/63 -- -- -- 16 --   23 1457 95/54 -- -- -- 16 --   23 1443 -- -- -- -- 16 --   23 1442 98/56 -- -- -- 16 --   23 1441 -- 98  F (36.7  C) Oral -- -- --   23 1427 92/54 -- -- -- 16 --   23 1413 98/53 -- -- -- 16 --   23 1235 98/57 97.9  F (36.6  C) Oral -- 16 --   23 1040 99/62 -- -- -- 16 --     General - alert, comfortable  Heart - RRR, no murmurs  Lungs - CTA bilaterally  Abdomen - fundus firm, nontender, below umbilicus  Extremities - no edema    Admission on 2023   Component Date Value Ref Range Status    Hemoglobin 2023 11.5 (L)  11.7 - 15.7 g/dL Final    Treponema Antibody Total 2023 Nonreactive  Nonreactive Final    Ketone (Beta-Hydroxybutyrate) Vinny* 2023 <0.18  <=0.30 mmol/L Final    ABO/RH(D) 2023 B POS   Final    Antibody Screen 2023 Negative  Negative Final    SPECIMEN EXPIRATION DATE [id number]   Final    GLUCOSE BY METER POCT 2023 74  70 - 99 mg/dL Final    GLUCOSE BY METER POCT 2023 74  70 - 99 mg/dL Final    GLUCOSE BY METER POCT 2023 76  70 - 99 mg/dL Final     GLUCOSE BY METER POCT 09/23/2023 188 (H)  70 - 99 mg/dL Final    Hemoglobin 09/24/2023 11.0 (L)  11.7 - 15.7 g/dL Final    GLUCOSE BY METER POCT 09/24/2023 80  70 - 99 mg/dL Final      Discharge Medications:   See medication reconciliation.     Completed by:   Alondra Jay MD  [facility]  9/24/2023 9:51 AM   used:  declined .

## 2023-09-26 ENCOUNTER — MEDICAL CORRESPONDENCE (OUTPATIENT)
Dept: HEALTH INFORMATION MANAGEMENT | Facility: CLINIC | Age: 28
End: 2023-09-26
Payer: COMMERCIAL

## 2023-10-08 ENCOUNTER — HEALTH MAINTENANCE LETTER (OUTPATIENT)
Age: 28
End: 2023-10-08

## 2023-10-30 ENCOUNTER — MEDICAL CORRESPONDENCE (OUTPATIENT)
Dept: HEALTH INFORMATION MANAGEMENT | Facility: CLINIC | Age: 28
End: 2023-10-30

## 2023-10-30 ENCOUNTER — PRENATAL OFFICE VISIT (OUTPATIENT)
Dept: FAMILY MEDICINE | Facility: CLINIC | Age: 28
End: 2023-10-30
Payer: COMMERCIAL

## 2023-10-30 VITALS
HEIGHT: 58 IN | TEMPERATURE: 97.8 F | RESPIRATION RATE: 16 BRPM | DIASTOLIC BLOOD PRESSURE: 67 MMHG | OXYGEN SATURATION: 97 % | WEIGHT: 138 LBS | HEART RATE: 59 BPM | SYSTOLIC BLOOD PRESSURE: 107 MMHG | BODY MASS INDEX: 28.97 KG/M2

## 2023-10-30 DIAGNOSIS — Z00.00 ROUTINE GENERAL MEDICAL EXAMINATION AT A HEALTH CARE FACILITY: Primary | ICD-10-CM

## 2023-10-30 DIAGNOSIS — Z12.4 CERVICAL CANCER SCREENING: ICD-10-CM

## 2023-10-30 DIAGNOSIS — Z86.32 HISTORY OF GESTATIONAL DIABETES: ICD-10-CM

## 2023-10-30 DIAGNOSIS — B18.1 CHRONIC VIRAL HEPATITIS B WITHOUT DELTA AGENT AND WITHOUT COMA (H): ICD-10-CM

## 2023-10-30 DIAGNOSIS — O24.410 DIET CONTROLLED GESTATIONAL DIABETES MELLITUS (GDM) IN THIRD TRIMESTER: ICD-10-CM

## 2023-10-30 DIAGNOSIS — Z30.013 ENCOUNTER FOR INITIAL PRESCRIPTION OF INJECTABLE CONTRACEPTIVE: ICD-10-CM

## 2023-10-30 PROBLEM — O35.BXX0 ECHOGENIC FOCUS OF HEART OF FETUS AFFECTING ANTEPARTUM CARE OF MOTHER: Status: RESOLVED | Noted: 2023-04-27 | Resolved: 2023-10-30

## 2023-10-30 PROBLEM — O09.93 SUPERVISION OF HIGH RISK PREGNANCY IN THIRD TRIMESTER: Status: RESOLVED | Noted: 2023-06-09 | Resolved: 2023-10-30

## 2023-10-30 PROBLEM — Z87.51 HISTORY OF PRETERM DELIVERY: Status: RESOLVED | Noted: 2018-08-22 | Resolved: 2023-10-30

## 2023-10-30 PROBLEM — O99.820 GROUP B STREPTOCOCCAL CARRIAGE COMPLICATING PREGNANCY: Status: RESOLVED | Noted: 2023-08-28 | Resolved: 2023-10-30

## 2023-10-30 LAB
ALT SERPL W P-5'-P-CCNC: 20 U/L (ref 0–50)
HBA1C MFR BLD: 5.4 % (ref 0–5.6)

## 2023-10-30 PROCEDURE — 99000 SPECIMEN HANDLING OFFICE-LAB: CPT | Performed by: FAMILY MEDICINE

## 2023-10-30 PROCEDURE — G0145 SCR C/V CYTO,THINLAYER,RESCR: HCPCS | Performed by: FAMILY MEDICINE

## 2023-10-30 PROCEDURE — 84460 ALANINE AMINO (ALT) (SGPT): CPT | Performed by: FAMILY MEDICINE

## 2023-10-30 PROCEDURE — 99395 PREV VISIT EST AGE 18-39: CPT | Mod: 25 | Performed by: FAMILY MEDICINE

## 2023-10-30 PROCEDURE — 91320 SARSCV2 VAC 30MCG TRS-SUC IM: CPT | Performed by: FAMILY MEDICINE

## 2023-10-30 PROCEDURE — 87350 HEPATITIS BE AG IA: CPT | Mod: 90 | Performed by: FAMILY MEDICINE

## 2023-10-30 PROCEDURE — 99207 PR POST PARTUM EXAM: CPT | Performed by: FAMILY MEDICINE

## 2023-10-30 PROCEDURE — 36415 COLL VENOUS BLD VENIPUNCTURE: CPT | Performed by: FAMILY MEDICINE

## 2023-10-30 PROCEDURE — 90471 IMMUNIZATION ADMIN: CPT | Performed by: FAMILY MEDICINE

## 2023-10-30 PROCEDURE — 83036 HEMOGLOBIN GLYCOSYLATED A1C: CPT | Performed by: FAMILY MEDICINE

## 2023-10-30 PROCEDURE — 87517 HEPATITIS B DNA QUANT: CPT | Performed by: FAMILY MEDICINE

## 2023-10-30 PROCEDURE — 90677 PCV20 VACCINE IM: CPT | Performed by: FAMILY MEDICINE

## 2023-10-30 PROCEDURE — 90480 ADMN SARSCOV2 VAC 1/ONLY CMP: CPT | Performed by: FAMILY MEDICINE

## 2023-10-30 PROCEDURE — 87624 HPV HI-RISK TYP POOLED RSLT: CPT | Performed by: FAMILY MEDICINE

## 2023-10-30 RX ORDER — MEDROXYPROGESTERONE ACETATE 150 MG/ML
150 INJECTION, SUSPENSION INTRAMUSCULAR
Status: DISCONTINUED | OUTPATIENT
Start: 2023-10-30 | End: 2023-10-30

## 2023-10-30 ASSESSMENT — ENCOUNTER SYMPTOMS
DIZZINESS: 0
CHILLS: 0
WEAKNESS: 0
HEMATOCHEZIA: 0
JOINT SWELLING: 0
DYSURIA: 0
FEVER: 0
NERVOUS/ANXIOUS: 0
COUGH: 0
NAUSEA: 0
DIARRHEA: 0
EYE PAIN: 0
HEMATURIA: 0
ARTHRALGIAS: 0
FREQUENCY: 0
SORE THROAT: 0
BREAST MASS: 0
MYALGIAS: 0
ABDOMINAL PAIN: 0
PARESTHESIAS: 0
CONSTIPATION: 0
PALPITATIONS: 0
SHORTNESS OF BREATH: 0
HEADACHES: 0
HEARTBURN: 0

## 2023-10-30 NOTE — PROGRESS NOTES
SUBJECTIVE:   CC: Wendy is an 28 year old who presents for preventive health visit.       10/30/2023    10:40 AM   Additional Questions   Roomed by hser   Accompanied by son,        Healthy Habits:     Getting at least 3 servings of Calcium per day:  Yes    Bi-annual eye exam:  NO    Dental care twice a year:  Yes    Sleep apnea or symptoms of sleep apnea:  None    Diet:  Regular (no restrictions)    Frequency of exercise:  1 day/week    Duration of exercise:  15-30 minutes    Taking medications regularly:  Yes    Medication side effects:  None    Additional concerns today:  No    Social History     Tobacco Use    Smoking status: Never     Passive exposure: Never    Smokeless tobacco: Never    Tobacco comments:     no passive exposure   Substance Use Topics    Alcohol use: No             10/30/2023    10:25 AM   Alcohol Use   Prescreen: >3 drinks/day or >7 drinks/week? No     Reviewed orders with patient.  Reviewed health maintenance and updated orders accordingly - Yes    History of abnormal Pap smear: NO - age 30-65 PAP every 5 years with negative HPV co-testing recommended      9/7/2018     2:59 PM 4/16/2015     1:50 PM   PAP / HPV   PAP Negative for squamous intraepithelial lesion or malignancy  Electronically signed by Elvira Griffith CT (ASCP) on 9/12/2018 at  2:19 PM    Negative for squamous intraepithelial lesion or malignancy  Electronically signed by Edda Salas CT (ASCP) on 4/23/2015 at  3:28 PM        Reviewed and updated as needed this visit by clinical staff   Tobacco  Allergies  Meds  Problems  Med Hx  Surg Hx  Fam Hx          Reviewed and updated as needed this visit by Provider   Tobacco  Allergies  Meds  Problems  Med Hx  Surg Hx  Fam Hx           Review of Systems   Constitutional:  Negative for chills and fever.   HENT:  Negative for congestion, ear pain, hearing loss and sore throat.    Eyes:  Negative for pain and visual disturbance.   Respiratory:  Negative for  "cough and shortness of breath.    Cardiovascular:  Negative for chest pain, palpitations and peripheral edema.   Gastrointestinal:  Negative for abdominal pain, constipation, diarrhea, heartburn, hematochezia and nausea.   Breasts:  Negative for tenderness, breast mass and discharge.   Genitourinary:  Negative for dysuria, frequency, genital sores, hematuria, pelvic pain, urgency, vaginal bleeding and vaginal discharge.   Musculoskeletal:  Negative for arthralgias, joint swelling and myalgias.   Skin:  Negative for rash.   Neurological:  Negative for dizziness, weakness, headaches and paresthesias.   Psychiatric/Behavioral:  Negative for mood changes. The patient is not nervous/anxious.        OBJECTIVE:   /67 (BP Location: Left arm, Patient Position: Sitting, Cuff Size: Adult Regular)   Pulse 59   Temp 97.8  F (36.6  C) (Temporal)   Resp 16   Ht 1.48 m (4' 10.27\")   Wt 62.6 kg (138 lb)   LMP 12/08/2022 (Approximate)   SpO2 97%   BMI 28.58 kg/m    Physical Exam  GENERAL: healthy, alert and no distress  EYES: Eyes grossly normal to inspection, PERRL and conjunctivae and sclerae normal  HENT: ear canals and TM's normal, nose and mouth without ulcers or lesions  NECK: no adenopathy, no asymmetry, masses, or scars and thyroid normal to palpation  RESP: lungs clear to auscultation - no rales, rhonchi or wheezes  BREAST: normal without masses, tenderness or nipple discharge and no palpable axillary masses or adenopathy  CV: regular rate and rhythm, normal S1 S2, no S3 or S4, no murmur, click or rub, no peripheral edema and peripheral pulses strong  ABDOMEN: soft, nontender, no hepatosplenomegaly, no masses and bowel sounds normal   (female): normal female external genitalia, normal urethral meatus, vaginal mucosa pink, moist, well rugated, and normal cervix/adnexa/uterus without masses or discharge. There is a 1.3 cm mucus cyst 2cm into the vagina at 7 o'clock.   MS: no gross musculoskeletal defects noted, " "no edema  SKIN: no suspicious lesions or rashes  NEURO: Normal strength and tone, mentation intact and speech normal  PSYCH: mentation appears normal, affect normal/bright    Diagnostic Test Results:  Labs reviewed in Epic    ASSESSMENT/PLAN:   Wendy was seen today for postpartum care.    Diagnoses and all orders for this visit:    Routine general medical examination at a health care facility    Cervical cancer screening  -     Pap Screen with HPV - recommended age 30 - 65 years    Chronic viral hepatitis B  Dx date: 2015.  HBeAg: POSITIVE  DNA: >200,000 international unit(s)/mL   ALT: Elevated/Normal  Hep A:  Immune, Hep C: Neg, Hep D:  pending  Alcohol use:  Neg  FH HCC:  Neg   Was on tenofovir during pregnancy 2023.     -     Hepatitis Be antigen; Standing  -     ALT  -     Hep B Virus DNA Quant Real Time PCR  -     Hepatitis Be antigen    Diet controlled gestational diabetes mellitus (GDM) in third trimester  -     Hemoglobin A1c; Future  -     Hemoglobin A1c    Encounter for initial prescription of injectable contraceptive  We discussed contraception. She was considering Nexplanon vs/ Depo Provera. Leaning toward Depo.   -     medroxyPROGESTERone (DEPO-PROVERA) injection 150 mg    Other orders  -     PRIMARY CARE FOLLOW-UP SCHEDULING; Future  -     REVIEW OF HEALTH MAINTENANCE PROTOCOL ORDERS  -     Pneumococcal 20 Valent Conjugate (Prevnar 20)  -     COVID-19 12+ (2023-24) (PFIZER)        COUNSELING:  Reviewed preventive health counseling, as reflected in patient instructions      BMI:   Estimated body mass index is 28.58 kg/m  as calculated from the following:    Height as of this encounter: 1.48 m (4' 10.27\").    Weight as of this encounter: 62.6 kg (138 lb).   Weight management plan: Discussed healthy diet and exercise guidelines      She reports that she has never smoked. She has never been exposed to tobacco smoke. She has never used smokeless tobacco.        Blanche Scruggs MD  [facility] [address]

## 2023-10-31 LAB
HBV DNA SERPL NAA+PROBE-ACNC: ABNORMAL IU/ML
HBV DNA SERPL NAA+PROBE-LOG IU: 8.5 {LOG_IU}/ML

## 2023-11-01 LAB
BKR LAB AP GYN ADEQUACY: NORMAL
BKR LAB AP GYN INTERPRETATION: NORMAL
BKR LAB AP HPV REFLEX: NORMAL
BKR LAB AP PREVIOUS ABNORMAL: NORMAL
HBV E AG SERPL QL IA: POSITIVE
PATH REPORT.COMMENTS IMP SPEC: NORMAL
PATH REPORT.COMMENTS IMP SPEC: NORMAL
PATH REPORT.RELEVANT HX SPEC: NORMAL

## 2023-11-02 LAB
HUMAN PAPILLOMA VIRUS 16 DNA: NEGATIVE
HUMAN PAPILLOMA VIRUS 18 DNA: NEGATIVE
HUMAN PAPILLOMA VIRUS FINAL DIAGNOSIS: NORMAL
HUMAN PAPILLOMA VIRUS OTHER HR: NEGATIVE

## 2023-11-22 ENCOUNTER — TELEPHONE (OUTPATIENT)
Dept: FAMILY MEDICINE | Facility: CLINIC | Age: 28
End: 2023-11-22
Payer: COMMERCIAL

## 2023-11-22 NOTE — TELEPHONE ENCOUNTER
----- Message from Blanche Scruggs MD sent at 11/22/2023  9:44 AM CST -----  Please let Wendy know:  You continue to have high levels of hepatitis B in your blood.     I'll put in a referral for you to see your liver specialist to discuss whether you should take the tenofovir even when you're not pregnant.    Please help her schedule with GI.

## 2023-11-22 NOTE — TELEPHONE ENCOUNTER
Called patient and discussed results. Offered to transfer to GI scheduling with , patient declined. Provided phone number for GI scheduling--she will have family assist her in calling them.

## 2023-11-24 NOTE — CONFIDENTIAL NOTE
DIAGNOSIS:  Chronic viral hepatitis B without delta agent and without coma    Appt Date:  12.06.2023   NOTES STATUS DETAILS   OFFICE NOTE from referring provider Internal 10.30.2023 Blanche Scruggs MD    OFFICE NOTES from other specialists Internal 06.23.2023 Multiple Providers     04.27.2023   Violetta Bullock MD      DISCHARGE SUMMARY from hospital     MEDICATION LIST Internal    LIVER BIOSPY (IF APPLICABLE)      PATHOLOGY REPORTS      IMAGING     ENDOSCOPY (IF AVAILABLE)     COLONOSCOPY (IF AVAILABLE)     ULTRASOUND LIVER     CT OF ABDOMEN     MRI OF LIVER     FIBROSCAN, US ELASTOGRAPHY, FIBROSIS SCAN, MR ELASTOGRAPHY     LABS     HEPATIC PANEL (LIVER PANEL)     BASIC METABOLIC PANEL     COMPLETE METABOLIC PANEL Internal 02.22.2023   COMPLETE BLOOD COUNT (CBC) Internal 02.22.2023   INTERNATIONAL NORMALIZED RATIO (INR)     HEPATITIS C ANTIBODY Internal 02.22.2023   HEPATITIS C VIRAL LOAD/PCR     HEPATITIS C GENOTYPE     HEPATITIS B SURFACE ANTIGEN Internal 10.30.2023    HEPATITIS B SURFACE ANTIBODY     HEPATITIS B DNA QUANT LEVEL     HEPATITIS B CORE ANTIBODY

## 2023-11-27 ENCOUNTER — MEDICAL CORRESPONDENCE (OUTPATIENT)
Dept: HEALTH INFORMATION MANAGEMENT | Facility: CLINIC | Age: 28
End: 2023-11-27
Payer: COMMERCIAL

## 2023-11-28 DIAGNOSIS — B18.1 CHRONIC VIRAL HEPATITIS B WITHOUT DELTA AGENT AND WITHOUT COMA (H): Primary | ICD-10-CM

## 2023-11-29 ENCOUNTER — LAB (OUTPATIENT)
Dept: LAB | Facility: CLINIC | Age: 28
End: 2023-11-29
Payer: COMMERCIAL

## 2023-11-29 DIAGNOSIS — B18.1 CHRONIC VIRAL HEPATITIS B WITHOUT DELTA AGENT AND WITHOUT COMA (H): ICD-10-CM

## 2023-11-29 LAB
ALBUMIN SERPL BCG-MCNC: 4.2 G/DL (ref 3.5–5.2)
ALP SERPL-CCNC: 64 U/L (ref 40–150)
ALT SERPL W P-5'-P-CCNC: 18 U/L (ref 0–50)
ANION GAP SERPL CALCULATED.3IONS-SCNC: 9 MMOL/L (ref 7–15)
AST SERPL W P-5'-P-CCNC: 23 U/L (ref 0–45)
BILIRUB DIRECT SERPL-MCNC: <0.2 MG/DL (ref 0–0.3)
BILIRUB SERPL-MCNC: 0.3 MG/DL
BUN SERPL-MCNC: 16.5 MG/DL (ref 6–20)
CALCIUM SERPL-MCNC: 9.1 MG/DL (ref 8.6–10)
CHLORIDE SERPL-SCNC: 105 MMOL/L (ref 98–107)
CREAT SERPL-MCNC: 0.64 MG/DL (ref 0.51–0.95)
DEPRECATED HCO3 PLAS-SCNC: 25 MMOL/L (ref 22–29)
EGFRCR SERPLBLD CKD-EPI 2021: >90 ML/MIN/1.73M2
ERYTHROCYTE [DISTWIDTH] IN BLOOD BY AUTOMATED COUNT: 12.6 % (ref 10–15)
GLUCOSE SERPL-MCNC: 112 MG/DL (ref 70–99)
HBV CORE AB SERPL QL IA: REACTIVE
HBV CORE IGM SERPL QL IA: NONREACTIVE
HBV SURFACE AB SERPL IA-ACNC: 2.3 M[IU]/ML
HBV SURFACE AB SERPL IA-ACNC: NONREACTIVE M[IU]/ML
HCT VFR BLD AUTO: 39 % (ref 35–47)
HGB BLD-MCNC: 13 G/DL (ref 11.7–15.7)
INR PPP: 1.06 (ref 0.85–1.15)
MCH RBC QN AUTO: 27.7 PG (ref 26.5–33)
MCHC RBC AUTO-ENTMCNC: 33.3 G/DL (ref 31.5–36.5)
MCV RBC AUTO: 83 FL (ref 78–100)
PLATELET # BLD AUTO: 222 10E3/UL (ref 150–450)
POTASSIUM SERPL-SCNC: 3.5 MMOL/L (ref 3.4–5.3)
PROT SERPL-MCNC: 6.9 G/DL (ref 6.4–8.3)
RBC # BLD AUTO: 4.7 10E6/UL (ref 3.8–5.2)
SODIUM SERPL-SCNC: 139 MMOL/L (ref 135–145)
WBC # BLD AUTO: 5.1 10E3/UL (ref 4–11)

## 2023-11-29 PROCEDURE — 85027 COMPLETE CBC AUTOMATED: CPT

## 2023-11-29 PROCEDURE — 86707 HEPATITIS BE ANTIBODY: CPT | Mod: 90

## 2023-11-29 PROCEDURE — 99000 SPECIMEN HANDLING OFFICE-LAB: CPT

## 2023-11-29 PROCEDURE — 85610 PROTHROMBIN TIME: CPT

## 2023-11-29 PROCEDURE — 86706 HEP B SURFACE ANTIBODY: CPT

## 2023-11-29 PROCEDURE — 82248 BILIRUBIN DIRECT: CPT

## 2023-11-29 PROCEDURE — 86704 HEP B CORE ANTIBODY TOTAL: CPT

## 2023-11-29 PROCEDURE — 86705 HEP B CORE ANTIBODY IGM: CPT

## 2023-11-29 PROCEDURE — 36415 COLL VENOUS BLD VENIPUNCTURE: CPT

## 2023-11-29 PROCEDURE — 80053 COMPREHEN METABOLIC PANEL: CPT

## 2023-11-30 LAB — HBV E AB SERPL QL IA: NEGATIVE

## 2023-12-05 ENCOUNTER — OFFICE VISIT (OUTPATIENT)
Dept: FAMILY MEDICINE | Facility: CLINIC | Age: 28
End: 2023-12-05
Payer: COMMERCIAL

## 2023-12-05 VITALS
WEIGHT: 142.9 LBS | OXYGEN SATURATION: 98 % | DIASTOLIC BLOOD PRESSURE: 65 MMHG | BODY MASS INDEX: 29.59 KG/M2 | TEMPERATURE: 98.1 F | HEART RATE: 59 BPM | SYSTOLIC BLOOD PRESSURE: 105 MMHG | RESPIRATION RATE: 18 BRPM

## 2023-12-05 DIAGNOSIS — H10.33 ACUTE CONJUNCTIVITIS OF BOTH EYES, UNSPECIFIED ACUTE CONJUNCTIVITIS TYPE: Primary | ICD-10-CM

## 2023-12-05 PROCEDURE — 99213 OFFICE O/P EST LOW 20 MIN: CPT | Performed by: PHYSICIAN ASSISTANT

## 2023-12-05 RX ORDER — POLYMYXIN B SULFATE AND TRIMETHOPRIM 1; 10000 MG/ML; [USP'U]/ML
1-2 SOLUTION OPHTHALMIC EVERY 4 HOURS
Qty: 5 ML | Refills: 0 | Status: SHIPPED | OUTPATIENT
Start: 2023-12-05 | End: 2023-12-12

## 2023-12-05 NOTE — PROGRESS NOTES
Assessment & Plan:      Problem List Items Addressed This Visit    None  Visit Diagnoses       Acute conjunctivitis of both eyes, unspecified acute conjunctivitis type    -  Primary    Relevant Medications    polymixin b-trimethoprim (POLYTRIM) 52609-7.1 UNIT/ML-% ophthalmic solution          Medical Decision Making  Patient presents with cough and rhinorrhea for 2 weeks with new bilateral eye redness.  Symptoms are most likely viral.  However, given for the 5 members at home with treated bacterial conjunctivitis, opted to treat patient with antibiotic eyedrops at this time.  Discussed treatment and symptomatic care.  Allergies and medication interactions reviewed.  Discussed signs of worsening symptoms and when to follow-up with PCP if no symptom improvement.     Subjective:      Wendy Alonzo is a 28 year old female here for evaluation of cough and right eye.  Onset of symptoms was 2 weeks ago.  Cough and congestion started 2 weeks ago.  Patient then woke up yesterday with bilateral red eyes and goopiness.  Patient has 4 family members at home while being treated for bacterial conjunctivitis.     The following portions of the patient's history were reviewed and updated as appropriate: allergies, current medications, and problem list.     Review of Systems  Pertinent items are noted in HPI.    Allergies  Allergies   Allergen Reactions    [place] Pollen Allergy Skin Test Itching     Itchy throat + eyes. No swelling/breathing problems.  Itchy throat + eyes. No swelling/breathing problems.       Family History   Problem Relation Age of Onset    Hepatitis Mother         Chronic hepatitis B    Tuberculosis Mother     No Known Problems Father     No Known Problems Daughter     No Known Problems Daughter     No Known Problems Son      Birth Son 0        25 weeks 2 days    No Known Problems Son        Social History     Tobacco Use    Smoking status: Never     Passive exposure: Never    Smokeless tobacco: Never     Tobacco comments:     no passive exposure   Substance Use Topics    Alcohol use: No        Objective:      /65 (BP Location: Right arm, Patient Position: Sitting, Cuff Size: Adult Regular)   Pulse 59   Temp 98.1  F (36.7  C) (Oral)   Resp 18   Wt 64.8 kg (142 lb 14.4 oz)   SpO2 98%   BMI 29.59 kg/m    General appearance - alert, well appearing, and in no distress and non-toxic  Eyes - bilateral conjunctivae/scleral erythema, no purulent discharge seen  Ears - bilateral TM's and external ear canals normal  Nose - normal and patent, no erythema, discharge or polyps  Mouth - mucous membranes moist, pharynx normal without lesions  Neck - supple, no significant adenopathy  Chest - clear to auscultation, no wheezes, rales or rhonchi, symmetric air entry  Heart - normal rate, regular rhythm, normal S1, S2, no murmurs, rubs, clicks or gallops    The use of Dragon/[employer] dictation services was used to construct the content of this note; any grammatical errors are non-intentional. Please contact the author directly if you are in need of any clarification.

## 2023-12-06 ENCOUNTER — OFFICE VISIT (OUTPATIENT)
Dept: GASTROENTEROLOGY | Facility: CLINIC | Age: 28
End: 2023-12-06
Attending: FAMILY MEDICINE
Payer: COMMERCIAL

## 2023-12-06 ENCOUNTER — PRE VISIT (OUTPATIENT)
Dept: GASTROENTEROLOGY | Facility: CLINIC | Age: 28
End: 2023-12-06
Payer: COMMERCIAL

## 2023-12-06 VITALS
SYSTOLIC BLOOD PRESSURE: 123 MMHG | RESPIRATION RATE: 16 BRPM | WEIGHT: 143 LBS | OXYGEN SATURATION: 100 % | BODY MASS INDEX: 30.02 KG/M2 | DIASTOLIC BLOOD PRESSURE: 82 MMHG | HEIGHT: 58 IN | HEART RATE: 67 BPM | TEMPERATURE: 97.9 F

## 2023-12-06 DIAGNOSIS — B18.1 CHRONIC VIRAL HEPATITIS B WITHOUT DELTA AGENT AND WITHOUT COMA (H): Primary | ICD-10-CM

## 2023-12-06 DIAGNOSIS — B18.1 CHRONIC VIRAL HEPATITIS B WITHOUT DELTA AGENT AND WITHOUT COMA (H): ICD-10-CM

## 2023-12-06 PROCEDURE — 99204 OFFICE O/P NEW MOD 45 MIN: CPT | Mod: 25 | Performed by: PHYSICIAN ASSISTANT

## 2023-12-06 PROCEDURE — G0463 HOSPITAL OUTPT CLINIC VISIT: HCPCS | Performed by: PHYSICIAN ASSISTANT

## 2023-12-06 PROCEDURE — 91200 LIVER ELASTOGRAPHY: CPT | Mod: 26 | Performed by: PHYSICIAN ASSISTANT

## 2023-12-06 RX ORDER — TENOFOVIR DISOPROXIL FUMARATE 300 MG/1
300 TABLET, FILM COATED ORAL DAILY
Qty: 90 TABLET | Refills: 1 | Status: SHIPPED | OUTPATIENT
Start: 2023-12-06 | End: 2024-06-07

## 2023-12-06 RX ORDER — TENOFOVIR DISOPROXIL FUMARATE 300 MG/1
300 TABLET, FILM COATED ORAL DAILY
Qty: 90 TABLET | Refills: 1 | Status: CANCELLED | OUTPATIENT
Start: 2023-12-06

## 2023-12-06 ASSESSMENT — PAIN SCALES - GENERAL: PAINLEVEL: NO PAIN (0)

## 2023-12-06 NOTE — NURSING NOTE
"Chief Complaint   Patient presents with    Consult     Hep B     Vital signs:  Temp: 97.9  F (36.6  C) Temp src: Oral BP: 123/82 Pulse: 67   Resp: 16 SpO2: 100 %     Height: 148 cm (4' 10.27\") Weight: 64.9 kg (143 lb)  Estimated body mass index is 29.61 kg/m  as calculated from the following:    Height as of this encounter: 1.48 m (4' 10.27\").    Weight as of this encounter: 64.9 kg (143 lb).      Jolie Tee, [facility]  12/6/2023 9:07 AM    "

## 2023-12-06 NOTE — PROGRESS NOTES
Hepatology Clinic Note  Wendy Alonzo   Date of Birth 1995    REASON FOR CONSULTATION: Hepatitis B  REFERRING PROVIDER: Blanche Scruggs MD         Assessment/plan:     Wendy Alonzo is a 29 YO F with chronic hepatitis B, e antigen positive and e antibody negative. Believes she was diagnosed ~7 yrs ago.   Hep delta antibody ordered but not completed   Hep B core AB reactive, Hep B core AB IgM nonreactive   HBV DNA: 291,000,000 on 10/30/23  ALT/AST: 18, 23 on 11/29/23  Fibroscan 12/6/23: Median CAP score 288 dB/m, median score of 3.9 kPa  Liver Function:  INR, PLT, albumin, Tbili within normal limits  Had been on Viread daily for Hep B with high viral load in 3rd trimester; prescribed by Dr. Snow   > Looks like Rx was from 7/1/23-9/24/23  Not currently on Hep B treatment   No hx liver bx    # Chronic Hepatitis B, high viral load   - Treatment: currently indicated with high viral load    > Sent in Rx for Viread 300 mg daily   - Follow up labs in 6 months: Hepatic panel, CBC, INR, BMP, HBV DNA quant.  - Ordered US abd since no past imaging of liver per chart review   - Discussed Hepatitis B transmission, natural course of Hepatitis B and indications for treatment. Discussed importance of regular labs and follow-up.   - Recommended Hep B testing for other members of household and immunization if necessary as well as any new sexual partners.   - Follow-up in clinic in 6 months     Liberty Varela PA-C   [facility]at[facility]  -----------------------------------------------------       HPI:     Wendy Alonzo is a 29 YO F presenting with her partner for evaluation and treatment of chronic hepatitis B.     Patient presents today stating that she believes she was diagnosed with hepatitis B about 7 years ago.  Denies any other underlying liver disease.  No known family history of hepatitis B or liver disease.  No history of liver biopsy.    Believes she was treated with a hepatitis B medication when she was pregnant  this summer.  States she gave birth 2023.  Is not currently breast-feeding.      No recent fevers, chills, nausea, vomiting or abdominal pain.  Denies yellowing of skin or eyes.  Reports appetite is good.  Weight has been stable as of late.  No lower extremity edema.  No confusion.  No bright red blood per rectum or melena.    No history of IV drug use.  Denies alcohol use.  No current tobacco or illicit drug use.    Currently lives in [place] and works at [employer] in [place].    PMH:  has a past medical history of Diet controlled gestational diabetes mellitus (GDM) in third trimester (2023), History of  delivery, currently pregnant (2018), Infectious viral hepatitis, Miscarriage (2020),  delivery, delivered (2017), and  labor (2017).    SMH:  has a past surgical history that includes Pr Surg Rx Missed Abortn,1St Tri (N/A, 2020).     Medications:   Current Outpatient Medications   Medication    acetone urine (KETOSTIX) test strip    blood glucose (NO BRAND SPECIFIED) test strip    blood glucose monitoring (NO BRAND SPECIFIED) meter device kit    polymixin b-trimethoprim (POLYTRIM) 09815-1.1 UNIT/ML-% ophthalmic solution    Prenatal MV-Min-Fe Fum-FA-DHA (PRENATAL MULTIVITAMIN PLUS DHA) 27-0.8-250 MG CAPS    thin (NO BRAND SPECIFIED) lancets     No current facility-administered medications for this visit.     Hepatitis B:   Lab Results   Component Value Date    HEPBANG Reactive (A) 2023    HBCAB Reactive (A) 2023    AUSAB 2.30 2023    HCVAB Nonreactive 2023     Lab Results   Component Value Date    HBQRESINST 291,000,000 (H) 10/30/2023    HBQRESINST 122,000,000 (H) 2023    HBQRES >170,000,000 (A) 2023    HBQRES [id number] (A) 2018    HBQRES >[id number] (A) 2018     Lab Results   Component Value Date    HBEABY Negative 2023    HBEAGN Positive (A) 10/30/2023          Medications:     Current Outpatient  Medications   Medication    acetone urine (KETOSTIX) test strip    blood glucose (NO BRAND SPECIFIED) test strip    blood glucose monitoring (NO BRAND SPECIFIED) meter device kit    polymixin b-trimethoprim (POLYTRIM) 73011-5.1 UNIT/ML-% ophthalmic solution    Prenatal MV-Min-Fe Fum-FA-DHA (PRENATAL MULTIVITAMIN PLUS DHA) 27-0.8-250 MG CAPS    thin (NO BRAND SPECIFIED) lancets     No current facility-administered medications for this visit.          Allergies:     Allergies   Allergen Reactions    Black [place] Pollen Allergy Skin Test Itching     Itchy throat + eyes. No swelling/breathing problems.  Itchy throat + eyes. No swelling/breathing problems.          Social History:     Social History     Socioeconomic History    Marital status: Single     Spouse name: Kendra Ramirez    Number of children: 5    Years of education: Not on file    Highest education level: Not on file   Occupational History    Occupation: Packaging   Tobacco Use    Smoking status: Never     Passive exposure: Never    Smokeless tobacco: Never    Tobacco comments:     no passive exposure   Vaping Use    Vaping Use: Never used   Substance and Sexual Activity    Alcohol use: No    Drug use: No    Sexual activity: Yes     Partners: Male     Birth control/protection: None     Comment: Undecided re: contraception 12/17   Other Topics Concern    Not on file   Social History Narrative    : Berenice Ramirez. Kids: Shira 2015, Simone 2016, Naresh 2017, Janina 2019.     Social Determinants of Health     Financial Resource Strain: Low Risk  (9/22/2023)    Financial Resource Strain     Within the past 12 months, have you or your family members you live with been unable to get utilities (heat, electricity) when it was really needed?: No   Food Insecurity: Low Risk  (9/22/2023)    Food Insecurity     Within the past 12 months, did you worry that your food would run out before you got money to buy more?: No     Within the past 12 months, did the food you bought just  "not last and you didn t have money to get more?: No   Transportation Needs: Low Risk  (2023)    Transportation Needs     Within the past 12 months, has lack of transportation kept you from medical appointments, getting your medicines, non-medical meetings or appointments, work, or from getting things that you need?: No   Physical Activity: Not on file   Stress: Not on file   Social Connections: Not on file   Interpersonal Safety: Not on file   Housing Stability: Low Risk  (2023)    Housing Stability     Do you have housing? : Yes     Are you worried about losing your housing?: No          Family History:     Family History   Problem Relation Age of Onset    Hepatitis Mother         Chronic hepatitis B    Tuberculosis Mother     No Known Problems Father     No Known Problems Daughter     No Known Problems Daughter     No Known Problems Son      Birth Son 0        25 weeks 2 days    No Known Problems Son           Review of Systems:   GEN: See HPI  HEENT: No change in vision or hearing, mouth sores, dysphagia, lymph nodes  Resp: No shortness of breath, coughing, hx of asthma  CV: No chest pain, palpitations, syncope   GI: See HPI  : No dysuria, history of stones, urine color    Skin: No rash; no pruritus or psoriasis  MS: No arthralgias, myalgias, joint swelling  Neuro: No memory changes, confusion, numbness    Heme: No difficulty clotting, bruising, bleeding  Psych:  No anxiety, depression, agitation          Physical Exam:   Vital signs:  Temp: 97.9  F (36.6  C) Temp src: Oral BP: 123/82 Pulse: 67   Resp: 16 SpO2: 100 %     Height: 148 cm (' 10.27\") Weight: 64.9 kg (143 lb)  Estimated body mass index is 29.61 kg/m  as calculated from the following:    Height as of this encounter: 1.48 m (' 10.\").    Weight as of this encounter: 64.9 kg (143 lb).  Gen: A&Ox3, NAD, well developed  HEENT: non-icteric   CV: RRR, no overt murmurs  Lung: CTA posteriorly, no wheezing or crackles.   Abd: soft, NT, ND, " "no palpable splenomegaly, liver is not palpable.   Ext: no edema, intact pulses.   Skin: No rash or jaundice  Neuro: grossly intact  Psych: appropriate mood and affects         Data:   Reviewed in person and significant for:    Lab Results   Component Value Date     11/29/2023      Lab Results   Component Value Date    POTASSIUM 3.5 11/29/2023     Lab Results   Component Value Date    CHLORIDE 105 11/29/2023     Lab Results   Component Value Date    CO2 25 11/29/2023     Lab Results   Component Value Date    BUN 16.5 11/29/2023     Lab Results   Component Value Date    CR 0.64 11/29/2023       Lab Results   Component Value Date    WBC 5.1 11/29/2023     Lab Results   Component Value Date    HGB 13.0 11/29/2023     Lab Results   Component Value Date    HCT 39.0 11/29/2023     Lab Results   Component Value Date    MCV 83 11/29/2023     Lab Results   Component Value Date     11/29/2023     Lab Results   Component Value Date    AST 23 11/29/2023     Lab Results   Component Value Date    ALT 18 11/29/2023     No results found for: \"BILICONJ\"   Lab Results   Component Value Date    BILITOTAL 0.3 11/29/2023     Lab Results   Component Value Date    ALBUMIN 4.2 11/29/2023     Lab Results   Component Value Date    PROTTOTAL 6.9 11/29/2023      Lab Results   Component Value Date    ALKPHOS 64 11/29/2023     Lab Results   Component Value Date    INR 1.06 11/29/2023       "

## 2023-12-06 NOTE — LETTER
12/6/2023         RE: Wendy Alonzo  [address] [place] [address]        Dear Colleague,    Thank you for referring your patient, Wendy Alonzo, to the [facility] [place]. Please see a copy of my visit note below.    Hepatology Clinic Note  Wendy Alonzo   Date of Birth 1995    REASON FOR CONSULTATION: Hepatitis B  REFERRING PROVIDER: Blanche Scruggs MD         Assessment/plan:     Wendy Alonzo is a 27 YO F with chronic hepatitis B, e antigen positive and e antibody negative. Believes she was diagnosed ~7 yrs ago.   Hep delta antibody ordered but not completed   Hep B core AB reactive, Hep B core AB IgM nonreactive   HBV DNA: 291,000,000 on 10/30/23  ALT/AST: 18, 23 on 11/29/23  Fibroscan 12/6/23: Median CAP score 288 dB/m, median score of 3.9 kPa  Liver Function:  INR, PLT, albumin, Tbili within normal limits  Had been on Viread daily for Hep B with high viral load in 3rd trimester; prescribed by Dr. Snow   > Looks like Rx was from 7/1/23-9/24/23  Not currently on Hep B treatment   No hx liver bx    # Chronic Hepatitis B, high viral load   - Treatment: currently indicated with high viral load    > Sent in Rx for Viread 300 mg daily   - Follow up labs in 6 months: Hepatic panel, CBC, INR, BMP, HBV DNA quant.  - Ordered US abd since no past imaging of liver per chart review   - Discussed Hepatitis B transmission, natural course of Hepatitis B and indications for treatment. Discussed importance of regular labs and follow-up.   - Recommended Hep B testing for other members of household and immunization if necessary as well as any new sexual partners.   - Follow-up in clinic in 6 months     Liberty Varela PA-C   [facility] Hepat[facility]  -----------------------------------------------------       HPI:     Wendy Alonzo is a 27 YO F presenting with her partner for evaluation and treatment of chronic hepatitis B.     Patient presents today stating that she believes she was diagnosed  with hepatitis B about 7 years ago.  Denies any other underlying liver disease.  No known family history of hepatitis B or liver disease.  No history of liver biopsy.    Believes she was treated with a hepatitis B medication when she was pregnant this summer.  States she gave birth 2023.  Is not currently breast-feeding.      No recent fevers, chills, nausea, vomiting or abdominal pain.  Denies yellowing of skin or eyes.  Reports appetite is good.  Weight has been stable as of late.  No lower extremity edema.  No confusion.  No bright red blood per rectum or melena.    No history of IV drug use.  Denies alcohol use.  No current tobacco or illicit drug use.    Currently lives in [place] and works at [employer] in [place].    PMH:  has a past medical history of Diet controlled gestational diabetes mellitus (GDM) in third trimester (2023), History of  delivery, currently pregnant (2018), Infectious viral hepatitis, Miscarriage (2020),  delivery, delivered (2017), and  labor (2017).    SMH:  has a past surgical history that includes Pr Surg Rx Missed Abortn,1St Tri (N/A, 2020).     Medications:   Current Outpatient Medications   Medication    acetone urine (KETOSTIX) test strip    blood glucose (NO BRAND SPECIFIED) test strip    blood glucose monitoring (NO BRAND SPECIFIED) meter device kit    polymixin b-trimethoprim (POLYTRIM) 77010-8.1 UNIT/ML-% ophthalmic solution    Prenatal MV-Min-Fe Fum-FA-DHA (PRENATAL MULTIVITAMIN PLUS DHA) 27-0.8-250 MG CAPS    thin (NO BRAND SPECIFIED) lancets     No current facility-administered medications for this visit.     Hepatitis B:   Lab Results   Component Value Date    HEPBANG Reactive (A) 2023    HBCAB Reactive (A) 2023    AUSAB 2.30 2023    HCVAB Nonreactive 2023     Lab Results   Component Value Date    HBQRESINST 291,000,000 (H) 10/30/2023    HBQRESINST 122,000,000 (H) 2023     HBQRES >170,000,000 (A) 02/22/2023    HBQRES [id number] (A) 12/28/2018    HBQRES >[id number] (A) 08/22/2018     Lab Results   Component Value Date    HBEABY Negative 11/29/2023    HBEAGN Positive (A) 10/30/2023          Medications:     Current Outpatient Medications   Medication    acetone urine (KETOSTIX) test strip    blood glucose (NO BRAND SPECIFIED) test strip    blood glucose monitoring (NO BRAND SPECIFIED) meter device kit    polymixin b-trimethoprim (POLYTRIM) 90065-0.1 UNIT/ML-% ophthalmic solution    Prenatal MV-Min-Fe Fum-FA-DHA (PRENATAL MULTIVITAMIN PLUS DHA) 27-0.8-250 MG CAPS    thin (NO BRAND SPECIFIED) lancets     No current facility-administered medications for this visit.          Allergies:     Allergies   Allergen Reactions    Black [place] Pollen Allergy Skin Test Itching     Itchy throat + eyes. No swelling/breathing problems.  Itchy throat + eyes. No swelling/breathing problems.          Social History:     Social History     Socioeconomic History    Marital status: Single     Spouse name: Kendra Ramirez    Number of children: 5    Years of education: Not on file    Highest education level: Not on file   Occupational History    Occupation: Packaging   Tobacco Use    Smoking status: Never     Passive exposure: Never    Smokeless tobacco: Never    Tobacco comments:     no passive exposure   Vaping Use    Vaping Use: Never used   Substance and Sexual Activity    Alcohol use: No    Drug use: No    Sexual activity: Yes     Partners: Male     Birth control/protection: None     Comment: Undecided re: contraception 12/17   Other Topics Concern    Not on file   Social History Narrative    : Berenice Ramirez. Kids: Shira 2015, Simone 2016, Naresh 2017, Janina 2019.     Social Determinants of Health     Financial Resource Strain: Low Risk  (9/22/2023)    Financial Resource Strain     Within the past 12 months, have you or your family members you live with been unable to get utilities (heat, electricity) when it  "was really needed?: No   Food Insecurity: Low Risk  (2023)    Food Insecurity     Within the past 12 months, did you worry that your food would run out before you got money to buy more?: No     Within the past 12 months, did the food you bought just not last and you didn t have money to get more?: No   Transportation Needs: Low Risk  (2023)    Transportation Needs     Within the past 12 months, has lack of transportation kept you from medical appointments, getting your medicines, non-medical meetings or appointments, work, or from getting things that you need?: No   Physical Activity: Not on file   Stress: Not on file   Social Connections: Not on file   Interpersonal Safety: Not on file   Housing Stability: Low Risk  (2023)    Housing Stability     Do you have housing? : Yes     Are you worried about losing your housing?: No          Family History:     Family History   Problem Relation Age of Onset    Hepatitis Mother         Chronic hepatitis B    Tuberculosis Mother     No Known Problems Father     No Known Problems Daughter     No Known Problems Daughter     No Known Problems Son      Birth Son 0        25 weeks 2 days    No Known Problems Son           Review of Systems:   GEN: See HPI  HEENT: No change in vision or hearing, mouth sores, dysphagia, lymph nodes  Resp: No shortness of breath, coughing, hx of asthma  CV: No chest pain, palpitations, syncope   GI: See HPI  : No dysuria, history of stones, urine color    Skin: No rash; no pruritus or psoriasis  MS: No arthralgias, myalgias, joint swelling  Neuro: No memory changes, confusion, numbness    Heme: No difficulty clotting, bruising, bleeding  Psych:  No anxiety, depression, agitation          Physical Exam:   Vital signs:  Temp: 97.9  F (36.6  C) Temp src: Oral BP: 123/82 Pulse: 67   Resp: 16 SpO2: 100 %     Height: 148 cm (4' 10.27\") Weight: 64.9 kg (143 lb)  Estimated body mass index is 29.61 kg/m  as calculated from the " "following:    Height as of this encounter: 1.48 m (4' 10.27\").    Weight as of this encounter: 64.9 kg (143 lb).  Gen: A&Ox3, NAD, well developed  HEENT: non-icteric   CV: RRR, no overt murmurs  Lung: CTA posteriorly, no wheezing or crackles.   Abd: soft, NT, ND, no palpable splenomegaly, liver is not palpable.   Ext: no edema, intact pulses.   Skin: No rash or jaundice  Neuro: grossly intact  Psych: appropriate mood and affects         Data:   Reviewed in person and significant for:    Lab Results   Component Value Date     11/29/2023      Lab Results   Component Value Date    POTASSIUM 3.5 11/29/2023     Lab Results   Component Value Date    CHLORIDE 105 11/29/2023     Lab Results   Component Value Date    CO2 25 11/29/2023     Lab Results   Component Value Date    BUN 16.5 11/29/2023     Lab Results   Component Value Date    CR 0.64 11/29/2023       Lab Results   Component Value Date    WBC 5.1 11/29/2023     Lab Results   Component Value Date    HGB 13.0 11/29/2023     Lab Results   Component Value Date    HCT 39.0 11/29/2023     Lab Results   Component Value Date    MCV 83 11/29/2023     Lab Results   Component Value Date     11/29/2023     Lab Results   Component Value Date    AST 23 11/29/2023     Lab Results   Component Value Date    ALT 18 11/29/2023     No results found for: \"BILICONJ\"   Lab Results   Component Value Date    BILITOTAL 0.3 11/29/2023     Lab Results   Component Value Date    ALBUMIN 4.2 11/29/2023     Lab Results   Component Value Date    PROTTOTAL 6.9 11/29/2023      Lab Results   Component Value Date    ALKPHOS 64 11/29/2023     Lab Results   Component Value Date    INR 1.06 11/29/2023       Liberty Varela PA-C  "

## 2023-12-08 ENCOUNTER — HOSPITAL ENCOUNTER (OUTPATIENT)
Dept: ULTRASOUND IMAGING | Facility: CLINIC | Age: 28
Discharge: HOME | End: 2023-12-08
Attending: PHYSICIAN ASSISTANT | Admitting: PHYSICIAN ASSISTANT
Payer: COMMERCIAL

## 2023-12-08 DIAGNOSIS — B18.1 CHRONIC VIRAL HEPATITIS B WITHOUT DELTA AGENT AND WITHOUT COMA (H): ICD-10-CM

## 2023-12-08 PROCEDURE — 76705 ECHO EXAM OF ABDOMEN: CPT

## 2024-01-30 ENCOUNTER — MEDICAL CORRESPONDENCE (OUTPATIENT)
Dept: HEALTH INFORMATION MANAGEMENT | Facility: CLINIC | Age: 29
End: 2024-01-30
Payer: COMMERCIAL

## 2024-06-07 ENCOUNTER — LAB (OUTPATIENT)
Dept: LAB | Facility: CLINIC | Age: 29
End: 2024-06-07
Payer: COMMERCIAL

## 2024-06-07 ENCOUNTER — OFFICE VISIT (OUTPATIENT)
Dept: GASTROENTEROLOGY | Facility: CLINIC | Age: 29
End: 2024-06-07
Attending: PHYSICIAN ASSISTANT
Payer: COMMERCIAL

## 2024-06-07 VITALS
SYSTOLIC BLOOD PRESSURE: 100 MMHG | DIASTOLIC BLOOD PRESSURE: 65 MMHG | BODY MASS INDEX: 27.96 KG/M2 | HEART RATE: 69 BPM | WEIGHT: 135 LBS

## 2024-06-07 DIAGNOSIS — B18.1 CHRONIC VIRAL HEPATITIS B WITHOUT DELTA AGENT AND WITHOUT COMA (H): Primary | ICD-10-CM

## 2024-06-07 DIAGNOSIS — B18.1 CHRONIC VIRAL HEPATITIS B WITHOUT DELTA AGENT AND WITHOUT COMA (H): ICD-10-CM

## 2024-06-07 LAB
AFP SERPL-MCNC: <1.8 NG/ML
ALBUMIN SERPL BCG-MCNC: 4.3 G/DL (ref 3.5–5.2)
ALP SERPL-CCNC: 68 U/L (ref 40–150)
ALT SERPL W P-5'-P-CCNC: 14 U/L (ref 0–50)
ANION GAP SERPL CALCULATED.3IONS-SCNC: 10 MMOL/L (ref 7–15)
AST SERPL W P-5'-P-CCNC: 17 U/L (ref 0–45)
BILIRUB DIRECT SERPL-MCNC: <0.2 MG/DL (ref 0–0.3)
BILIRUB SERPL-MCNC: 0.2 MG/DL
BUN SERPL-MCNC: 20 MG/DL (ref 6–20)
CALCIUM SERPL-MCNC: 9.1 MG/DL (ref 8.6–10)
CHLORIDE SERPL-SCNC: 106 MMOL/L (ref 98–107)
CREAT SERPL-MCNC: 0.99 MG/DL (ref 0.51–0.95)
DEPRECATED HCO3 PLAS-SCNC: 24 MMOL/L (ref 22–29)
EGFRCR SERPLBLD CKD-EPI 2021: 78 ML/MIN/1.73M2
ERYTHROCYTE [DISTWIDTH] IN BLOOD BY AUTOMATED COUNT: 14 % (ref 10–15)
GLUCOSE SERPL-MCNC: 87 MG/DL (ref 70–99)
HAV AB SER QL IA: REACTIVE
HCT VFR BLD AUTO: 38.3 % (ref 35–47)
HGB BLD-MCNC: 12.2 G/DL (ref 11.7–15.7)
INR PPP: 1.12 (ref 0.85–1.15)
MCH RBC QN AUTO: 25.9 PG (ref 26.5–33)
MCHC RBC AUTO-ENTMCNC: 31.9 G/DL (ref 31.5–36.5)
MCV RBC AUTO: 81 FL (ref 78–100)
PLATELET # BLD AUTO: 269 10E3/UL (ref 150–450)
POTASSIUM SERPL-SCNC: 4 MMOL/L (ref 3.4–5.3)
PROT SERPL-MCNC: 7.1 G/DL (ref 6.4–8.3)
RBC # BLD AUTO: 4.71 10E6/UL (ref 3.8–5.2)
SODIUM SERPL-SCNC: 140 MMOL/L (ref 135–145)
WBC # BLD AUTO: 5.7 10E3/UL (ref 4–11)

## 2024-06-07 PROCEDURE — 86692 HEPATITIS DELTA AGENT ANTBDY: CPT | Mod: 90 | Performed by: PATHOLOGY

## 2024-06-07 PROCEDURE — 99214 OFFICE O/P EST MOD 30 MIN: CPT | Performed by: PHYSICIAN ASSISTANT

## 2024-06-07 PROCEDURE — 82105 ALPHA-FETOPROTEIN SERUM: CPT | Performed by: PHYSICIAN ASSISTANT

## 2024-06-07 PROCEDURE — 80053 COMPREHEN METABOLIC PANEL: CPT | Performed by: PATHOLOGY

## 2024-06-07 PROCEDURE — 87350 HEPATITIS BE AG IA: CPT | Mod: 90 | Performed by: PATHOLOGY

## 2024-06-07 PROCEDURE — G0463 HOSPITAL OUTPT CLINIC VISIT: HCPCS | Performed by: PHYSICIAN ASSISTANT

## 2024-06-07 PROCEDURE — 82248 BILIRUBIN DIRECT: CPT | Performed by: PATHOLOGY

## 2024-06-07 PROCEDURE — 36415 COLL VENOUS BLD VENIPUNCTURE: CPT | Performed by: PATHOLOGY

## 2024-06-07 PROCEDURE — 99000 SPECIMEN HANDLING OFFICE-LAB: CPT | Performed by: PATHOLOGY

## 2024-06-07 PROCEDURE — 86708 HEPATITIS A ANTIBODY: CPT | Performed by: PHYSICIAN ASSISTANT

## 2024-06-07 PROCEDURE — 85027 COMPLETE CBC AUTOMATED: CPT | Performed by: PATHOLOGY

## 2024-06-07 PROCEDURE — 85610 PROTHROMBIN TIME: CPT | Performed by: PATHOLOGY

## 2024-06-07 PROCEDURE — 87517 HEPATITIS B DNA QUANT: CPT | Performed by: FAMILY MEDICINE

## 2024-06-07 RX ORDER — TENOFOVIR DISOPROXIL FUMARATE 300 MG/1
300 TABLET, FILM COATED ORAL DAILY
Qty: 90 TABLET | Refills: 2 | Status: SHIPPED | OUTPATIENT
Start: 2024-06-07

## 2024-06-07 ASSESSMENT — PAIN SCALES - GENERAL: PAINLEVEL: NO PAIN (0)

## 2024-06-07 NOTE — LETTER
6/7/2024      Wendy Alonzo  [address] [place] [address]      Dear Colleague,    Thank you for referring your patient, Wendy Alonzo, to the [facility] [place]. Please see a copy of my visit note below.    Hepatology Clinic Note  Wendy Alonzo   Date of Birth 1995    REASON FOR FOLLOW UP: Chronic hepatitis B         Assessment/plan:     Wendy Alonzo is a 28 YO F with chronic hepatitis B, e antigen positive and e antibody negative. Believes she was diagnosed ~7 yrs ago with HBV.     # Chronic Hepatitis B, hx high viral load (active)  Hep delta antibody ordered (in process).   Hep B core AB reactive, Hep B core AB IgM nonreactive   HBV DNA: 291,000,000 on 10/30/23; pending today's level  ALT/AST: 14, 17   Fibroscan 12/6/23: F0/1 and S2/3  Liver Function:  INR, PLT, albumin, Tbili within normal limits  Had been on Viread daily for Hep B with high viral load in 3rd trimester of pregnancy; prescribed by Dr. Snow              > Looks like Rx was from 7/1/23-9/24/23  At our last appointment on 12/6/23, I sent in Rx for Viread 300 mg daily   No hx liver bx  US abd limited 12/8/23: Liver w/ normal parenchyma with smooth contour. No focal mass.     PLAN:  - Continue on tenofovir 300 mg daily; sent in refills  - Ordered US abdomen limited  - Pending hepatitis B DNA level from today; hopefully this has decreased since last being drawn  - Reviewed MELD labs today in clinic  - Re-discussed Hepatitis B transmission, natural course of Hepatitis B and indications for treatment. Discussed importance of regular labs and follow-up.   - Recommended Hep B testing for other members of household and immunization if necessary as well as any new sexual partners.   - Follow-up in clinic in 6 months with labs same day: INR, AFP, BMP, hepatic panel, CBC, hep B DNA    Liberty Varela PA-C   [facility]  -----------------------------------------------------       HPI:     Patient presents today  to clinic for follow-up regarding hepatitis B she was last seen in clinic by myself 2023.  Patient presents today with her  and 2 children.  She had labs today.  Patient tells me she has been feeling well as of late.  Denies any recent significant changes in her health.  No recent changes in her medications.  The only medication she takes currently is tenofovir.  Patient states she needs a refill.  States she is adherent to the tenofovir.  Is not currently pregnant or breast-feeding.  No recent fevers, chills, nausea, vomiting or abdominal pain.  No yellowing of eyes or skin.  No diarrhea or constipation.  No bright red blood per rectum or melena.  Reports her appetite is good.  Weight has been stable.  States she stays well-hydrated.  Typically drinks water and coffee.    PMH:  has a past medical history of Diet controlled gestational diabetes mellitus (GDM) in third trimester (2023), History of  delivery, currently pregnant (2018), Infectious viral hepatitis, Miscarriage (2020),  delivery, delivered (2017), and  labor (2017).    SMH:  has a past surgical history that includes Pr Surg Rx Missed Abortn,1St Tri (N/A, 2020).     Hepatitis B:   Lab Results   Component Value Date    HEPBANG Reactive (A) 2023    HBCAB Reactive (A) 2023    AUSAB 2.30 2023    HCVAB Nonreactive 2023     Lab Results   Component Value Date    HBQRESINST 291,000,000 (H) 10/30/2023    HBQRESINST 122,000,000 (H) 2023    HBQRES >170,000,000 (A) 2023    HBQRES [id number] (A) 2018    HBQRES >[id number] (A) 2018     Lab Results   Component Value Date    HBEABY Negative 2023    HBEAGN Positive (A) 10/30/2023          Medications:     Current Outpatient Medications   Medication Sig Dispense Refill     tenofovir (VIREAD) 300 MG tablet Take 1 tablet (300 mg) by mouth daily 90 tablet 2     No current facility-administered medications for  this visit.          Allergies:     Allergies   Allergen Reactions     [place] Pollen Allergy Skin Test Itching     Itchy throat + eyes. No swelling/breathing problems.  Itchy throat + eyes. No swelling/breathing problems.          Social History:     Social History     Socioeconomic History     Marital status: Single     Spouse name: Kendra Ramirez     Number of children: 5     Years of education: Not on file     Highest education level: Not on file   Occupational History     Occupation: Packaging   Tobacco Use     Smoking status: Never     Passive exposure: Never     Smokeless tobacco: Never     Tobacco comments:     no passive exposure   Vaping Use     Vaping status: Never Used   Substance and Sexual Activity     Alcohol use: Not Currently     Drug use: No     Sexual activity: Yes     Partners: Male     Birth control/protection: None     Comment: Undecided re: contraception 12/17   Other Topics Concern     Not on file   Social History Narrative    : Berenice Ramirez. Kids: Shira 2015, Simone 2016, Naresh 2017, Janina 2019.     Social Determinants of Health     Financial Resource Strain: Low Risk  (9/22/2023)    Financial Resource Strain      Within the past 12 months, have you or your family members you live with been unable to get utilities (heat, electricity) when it was really needed?: No   Food Insecurity: Low Risk  (9/22/2023)    Food Insecurity      Within the past 12 months, did you worry that your food would run out before you got money to buy more?: No      Within the past 12 months, did the food you bought just not last and you didn t have money to get more?: No   Transportation Needs: Low Risk  (9/22/2023)    Transportation Needs      Within the past 12 months, has lack of transportation kept you from medical appointments, getting your medicines, non-medical meetings or appointments, work, or from getting things that you need?: No   Physical Activity: Not on file   Stress: Not on file   Social  "Connections: Not on file   Interpersonal Safety: Not on file   Housing Stability: Low Risk  (2023)    Housing Stability      Do you have housing? : Yes      Are you worried about losing your housing?: No          Family History:     Family History   Problem Relation Age of Onset     Other - See Comments Mother         COVID     No Known Problems Father      No Known Problems Daughter      No Known Problems Daughter      No Known Problems Son       Birth Son 0        25 weeks 2 days     No Known Problems Son           Review of Systems:   GEN: See HPI         Physical Exam:   Vital signs:      BP: 100/65 Pulse: 69             Weight: 61.2 kg (135 lb)  Estimated body mass index is 27.96 kg/m  as calculated from the following:    Height as of 23: 1.48 m (4' 10.27\").    Weight as of this encounter: 61.2 kg (135 lb).  Gen: A&Ox3, NAD, well developed  HEENT: non-icteric   CV: RRR, no overt murmurs  Lung: CTA, no wheezing or crackles.   Abd: soft, NT, ND, BS+  Ext: no edema, intact pulses.   Skin: No jaundice  Neuro: grossly intact   Psych: appropriate mood and affects         Data:   Reviewed in person and significant for:    Lab Results   Component Value Date     2023      Lab Results   Component Value Date    POTASSIUM 3.5 2023     Lab Results   Component Value Date    CHLORIDE 105 2023     Lab Results   Component Value Date    CO2 25 2023     Lab Results   Component Value Date    BUN 16.5 2023     Lab Results   Component Value Date    CR 0.64 2023       Lab Results   Component Value Date    WBC 5.7 2024     Lab Results   Component Value Date    HGB 12.2 2024     Lab Results   Component Value Date    HCT 38.3 2024     Lab Results   Component Value Date    MCV 81 2024     Lab Results   Component Value Date     2024       Lab Results   Component Value Date    AST 23 2023     Lab Results   Component Value Date    ALT 18 " "11/29/2023     No results found for: \"BILICONJ\"   Lab Results   Component Value Date    BILITOTAL 0.3 11/29/2023       Lab Results   Component Value Date    ALBUMIN 4.2 11/29/2023     Lab Results   Component Value Date    PROTTOTAL 6.9 11/29/2023      Lab Results   Component Value Date    ALKPHOS 64 11/29/2023     Lab Results   Component Value Date    INR 1.12 06/07/2024     Again, thank you for allowing me to participate in the care of your patient.        Sincerely,        Liberty Varela PA-C  "

## 2024-06-07 NOTE — PROGRESS NOTES
Hepatology Clinic Note  Wendy Alonzo   Date of Birth 1995    REASON FOR FOLLOW UP: Chronic hepatitis B         Assessment/plan:     Wendy Alonzo is a 28 YO F with chronic hepatitis B, e antigen positive and e antibody negative. Believes she was diagnosed ~7 yrs ago with HBV.     # Chronic Hepatitis B, hx high viral load (active)  Hep delta antibody ordered (in process).   Hep B core AB reactive, Hep B core AB IgM nonreactive   HBV DNA: 291,000,000 on 10/30/23; pending today's level  ALT/AST: 14, 17   Fibroscan 12/6/23: F0/1 and S2/3  Liver Function:  INR, PLT, albumin, Tbili within normal limits  Had been on Viread daily for Hep B with high viral load in 3rd trimester of pregnancy; prescribed by Dr. Snow              > Looks like Rx was from 7/1/23-9/24/23  At our last appointment on 12/6/23, I sent in Rx for Viread 300 mg daily   No hx liver bx  US abd limited 12/8/23: Liver w/ normal parenchyma with smooth contour. No focal mass.     PLAN:  - Continue on tenofovir 300 mg daily; sent in refills  - Ordered US abdomen limited  - Pending hepatitis B DNA level from today; hopefully this has decreased since last being drawn  - Reviewed MELD labs today in clinic  - Re-discussed Hepatitis B transmission, natural course of Hepatitis B and indications for treatment. Discussed importance of regular labs and follow-up.   - Recommended Hep B testing for other members of household and immunization if necessary as well as any new sexual partners.   - Follow-up in clinic in 6 months with labs same day: INR, AFP, BMP, hepatic panel, CBC, hep B DNA    Liberty Varela PA-C   [facility]  -----------------------------------------------------       HPI:     Patient presents today to clinic for follow-up regarding hepatitis B she was last seen in clinic by myself 12/6/2023.  Patient presents today with her  and 2 children.  She had labs today.  Patient tells me she has been feeling well as of late.   Denies any recent significant changes in her health.  No recent changes in her medications.  The only medication she takes currently is tenofovir.  Patient states she needs a refill.  States she is adherent to the tenofovir.  Is not currently pregnant or breast-feeding.  No recent fevers, chills, nausea, vomiting or abdominal pain.  No yellowing of eyes or skin.  No diarrhea or constipation.  No bright red blood per rectum or melena.  Reports her appetite is good.  Weight has been stable.  States she stays well-hydrated.  Typically drinks water and coffee.    PMH:  has a past medical history of Diet controlled gestational diabetes mellitus (GDM) in third trimester (2023), History of  delivery, currently pregnant (2018), Infectious viral hepatitis, Miscarriage (2020),  delivery, delivered (2017), and  labor (2017).    SMH:  has a past surgical history that includes Pr Surg Rx Missed Abortn,1St Tri (N/A, 2020).     Hepatitis B:   Lab Results   Component Value Date    HEPBANG Reactive (A) 2023    HBCAB Reactive (A) 2023    AUSAB 2.30 2023    HCVAB Nonreactive 2023     Lab Results   Component Value Date    HBQRESINST 291,000,000 (H) 10/30/2023    HBQRESINST 122,000,000 (H) 2023    HBQRES >170,000,000 (A) 2023    HBQRES [id number] (A) 2018    HBQRES >[id number] (A) 2018     Lab Results   Component Value Date    HBEABY Negative 2023    HBEAGN Positive (A) 10/30/2023          Medications:     Current Outpatient Medications   Medication Sig Dispense Refill    tenofovir (VIREAD) 300 MG tablet Take 1 tablet (300 mg) by mouth daily 90 tablet 2     No current facility-administered medications for this visit.          Allergies:     Allergies   Allergen Reactions    Black [place] Pollen Allergy Skin Test Itching     Itchy throat + eyes. No swelling/breathing problems.  Itchy throat + eyes. No swelling/breathing problems.           Social History:     Social History     Socioeconomic History    Marital status: Single     Spouse name: Kendra Ramirez    Number of children: 5    Years of education: Not on file    Highest education level: Not on file   Occupational History    Occupation: Packaging   Tobacco Use    Smoking status: Never     Passive exposure: Never    Smokeless tobacco: Never    Tobacco comments:     no passive exposure   Vaping Use    Vaping status: Never Used   Substance and Sexual Activity    Alcohol use: Not Currently    Drug use: No    Sexual activity: Yes     Partners: Male     Birth control/protection: None     Comment: Undecided re: contraception 12/17   Other Topics Concern    Not on file   Social History Narrative    : Berenice Ramirez. Kids: Shira 2015, Simone 2016, Naresh 2017, Janina 2019.     Social Determinants of Health     Financial Resource Strain: Low Risk  (9/22/2023)    Financial Resource Strain     Within the past 12 months, have you or your family members you live with been unable to get utilities (heat, electricity) when it was really needed?: No   Food Insecurity: Low Risk  (9/22/2023)    Food Insecurity     Within the past 12 months, did you worry that your food would run out before you got money to buy more?: No     Within the past 12 months, did the food you bought just not last and you didn t have money to get more?: No   Transportation Needs: Low Risk  (9/22/2023)    Transportation Needs     Within the past 12 months, has lack of transportation kept you from medical appointments, getting your medicines, non-medical meetings or appointments, work, or from getting things that you need?: No   Physical Activity: Not on file   Stress: Not on file   Social Connections: Not on file   Interpersonal Safety: Not on file   Housing Stability: Low Risk  (9/22/2023)    Housing Stability     Do you have housing? : Yes     Are you worried about losing your housing?: No          Family History:     Family History   Problem  "Relation Age of Onset    Other - See Comments Mother         COVID    No Known Problems Father     No Known Problems Daughter     No Known Problems Daughter     No Known Problems Son      Birth Son 0        25 weeks 2 days    No Known Problems Son           Review of Systems:   GEN: See HPI         Physical Exam:   Vital signs:      BP: 100/65 Pulse: 69             Weight: 61.2 kg (135 lb)  Estimated body mass index is 27.96 kg/m  as calculated from the following:    Height as of 23: 1.48 m (4' 10.27\").    Weight as of this encounter: 61.2 kg (135 lb).  Gen: A&Ox3, NAD, well developed  HEENT: non-icteric   CV: RRR, no overt murmurs  Lung: CTA, no wheezing or crackles.   Abd: soft, NT, ND, BS+  Ext: no edema, intact pulses.   Skin: No jaundice  Neuro: grossly intact   Psych: appropriate mood and affects         Data:   Reviewed in person and significant for:    Lab Results   Component Value Date     2023      Lab Results   Component Value Date    POTASSIUM 3.5 2023     Lab Results   Component Value Date    CHLORIDE 105 2023     Lab Results   Component Value Date    CO2 25 2023     Lab Results   Component Value Date    BUN 16.5 2023     Lab Results   Component Value Date    CR 0.64 2023       Lab Results   Component Value Date    WBC 5.7 2024     Lab Results   Component Value Date    HGB 12.2 2024     Lab Results   Component Value Date    HCT 38.3 2024     Lab Results   Component Value Date    MCV 81 2024     Lab Results   Component Value Date     2024       Lab Results   Component Value Date    AST 23 2023     Lab Results   Component Value Date    ALT 18 2023     No results found for: \"BILICONJ\"   Lab Results   Component Value Date    BILITOTAL 0.3 2023       Lab Results   Component Value Date    ALBUMIN 4.2 2023     Lab Results   Component Value Date    PROTTOTAL 6.9 2023      Lab Results "   Component Value Date    ALKPHOS 64 11/29/2023     Lab Results   Component Value Date    INR 1.12 06/07/2024

## 2024-06-07 NOTE — NURSING NOTE
"Chief Complaint   Patient presents with    RECHECK     Vital signs:      BP: 100/65 Pulse: 69             Weight: 61.2 kg (135 lb)  Estimated body mass index is 27.96 kg/m  as calculated from the following:    Height as of 12/6/23: 1.48 m (4' 10.27\").    Weight as of this encounter: 61.2 kg (135 lb).      Danielle Ni CMA   6/7/2024 10:03 AM    "

## 2024-06-08 LAB — HBV E AG SERPL QL IA: POSITIVE

## 2024-06-09 LAB
HBV DNA SERPL NAA+PROBE-ACNC: ABNORMAL IU/ML
HBV DNA SERPL NAA+PROBE-LOG IU: 5.7 {LOG_IU}/ML

## 2024-06-10 ENCOUNTER — HOSPITAL ENCOUNTER (OUTPATIENT)
Dept: ULTRASOUND IMAGING | Facility: HOSPITAL | Age: 29
Discharge: HOME | End: 2024-06-10
Attending: PHYSICIAN ASSISTANT | Admitting: PHYSICIAN ASSISTANT
Payer: COMMERCIAL

## 2024-06-10 DIAGNOSIS — B18.1 CHRONIC VIRAL HEPATITIS B WITHOUT DELTA AGENT AND WITHOUT COMA (H): ICD-10-CM

## 2024-06-10 LAB — HDV AB SER QL IA: NEGATIVE

## 2024-06-10 PROCEDURE — 76705 ECHO EXAM OF ABDOMEN: CPT

## 2024-09-24 ENCOUNTER — APPOINTMENT (OUTPATIENT)
Dept: INTERPRETER SERVICES | Facility: CLINIC | Age: 29
End: 2024-09-24
Payer: COMMERCIAL

## 2024-09-24 ENCOUNTER — TELEPHONE (OUTPATIENT)
Dept: GASTROENTEROLOGY | Facility: CLINIC | Age: 29
End: 2024-09-24
Payer: COMMERCIAL

## 2024-09-30 ENCOUNTER — PATIENT OUTREACH (OUTPATIENT)
Dept: CARE COORDINATION | Facility: CLINIC | Age: 29
End: 2024-09-30
Payer: COMMERCIAL

## 2024-10-14 ENCOUNTER — PATIENT OUTREACH (OUTPATIENT)
Dept: CARE COORDINATION | Facility: CLINIC | Age: 29
End: 2024-10-14
Payer: COMMERCIAL

## 2024-10-19 ENCOUNTER — IMMUNIZATION (OUTPATIENT)
Dept: FAMILY MEDICINE | Facility: CLINIC | Age: 29
End: 2024-10-19
Payer: COMMERCIAL

## 2024-10-19 PROCEDURE — 90656 IIV3 VACC NO PRSV 0.5 ML IM: CPT

## 2024-10-19 PROCEDURE — 90471 IMMUNIZATION ADMIN: CPT

## 2024-11-30 DIAGNOSIS — J18.9 ATYPICAL PNEUMONIA: Primary | ICD-10-CM

## 2024-11-30 RX ORDER — AZITHROMYCIN 250 MG/1
TABLET, FILM COATED ORAL
Qty: 6 TABLET | Refills: 0 | Status: SHIPPED | OUTPATIENT
Start: 2024-11-30 | End: 2024-12-05

## 2024-12-01 ENCOUNTER — HEALTH MAINTENANCE LETTER (OUTPATIENT)
Age: 29
End: 2024-12-01

## 2025-08-01 ENCOUNTER — RESULTS FOLLOW-UP (OUTPATIENT)
Dept: FAMILY MEDICINE | Facility: CLINIC | Age: 30
End: 2025-08-01

## 2025-08-01 DIAGNOSIS — R73.03 PREDIABETES: Primary | ICD-10-CM

## 2025-08-01 PROBLEM — N81.11 CYSTOCELE, MIDLINE: Status: ACTIVE | Noted: 2025-08-01

## 2025-08-04 ENCOUNTER — PATIENT OUTREACH (OUTPATIENT)
Dept: CARE COORDINATION | Facility: CLINIC | Age: 30
End: 2025-08-04
Payer: COMMERCIAL

## 2025-08-05 ENCOUNTER — APPOINTMENT (OUTPATIENT)
Dept: INTERPRETER SERVICES | Facility: CLINIC | Age: 30
End: 2025-08-05
Payer: COMMERCIAL

## 2025-08-06 ENCOUNTER — PATIENT OUTREACH (OUTPATIENT)
Dept: CARE COORDINATION | Facility: CLINIC | Age: 30
End: 2025-08-06
Payer: COMMERCIAL